# Patient Record
Sex: FEMALE | Race: WHITE | NOT HISPANIC OR LATINO | Employment: OTHER | URBAN - METROPOLITAN AREA
[De-identification: names, ages, dates, MRNs, and addresses within clinical notes are randomized per-mention and may not be internally consistent; named-entity substitution may affect disease eponyms.]

---

## 2017-01-05 ENCOUNTER — COMMUNICATION - HEALTHEAST (OUTPATIENT)
Dept: INTERNAL MEDICINE | Facility: CLINIC | Age: 62
End: 2017-01-05

## 2017-01-06 ENCOUNTER — HOSPITAL ENCOUNTER (OUTPATIENT)
Dept: NEUROLOGY | Facility: CLINIC | Age: 62
Setting detail: THERAPIES SERIES
Discharge: STILL ADMITTED OR ADMINISTRATIVE | End: 2017-01-06

## 2017-01-06 DIAGNOSIS — F43.23 ADJUSTMENT DISORDER WITH MIXED ANXIETY AND DEPRESSED MOOD: ICD-10-CM

## 2017-02-06 ENCOUNTER — RECORDS - HEALTHEAST (OUTPATIENT)
Dept: ADMINISTRATIVE | Facility: OTHER | Age: 62
End: 2017-02-06

## 2017-02-09 ENCOUNTER — COMMUNICATION - HEALTHEAST (OUTPATIENT)
Dept: INTERNAL MEDICINE | Facility: CLINIC | Age: 62
End: 2017-02-09

## 2017-02-13 ENCOUNTER — OFFICE VISIT - HEALTHEAST (OUTPATIENT)
Dept: INTERNAL MEDICINE | Facility: CLINIC | Age: 62
End: 2017-02-13

## 2017-02-13 ENCOUNTER — AMBULATORY - HEALTHEAST (OUTPATIENT)
Dept: INTERNAL MEDICINE | Facility: CLINIC | Age: 62
End: 2017-02-13

## 2017-02-13 DIAGNOSIS — F07.81 POST CONCUSSION SYNDROME: ICD-10-CM

## 2017-02-13 DIAGNOSIS — M10.9 GOUT: ICD-10-CM

## 2017-02-13 DIAGNOSIS — I10 ESSENTIAL HYPERTENSION WITH GOAL BLOOD PRESSURE LESS THAN 140/90: ICD-10-CM

## 2017-02-13 ASSESSMENT — MIFFLIN-ST. JEOR: SCORE: 1412.91

## 2017-02-16 ENCOUNTER — HOSPITAL ENCOUNTER (OUTPATIENT)
Dept: NEUROLOGY | Facility: CLINIC | Age: 62
Setting detail: THERAPIES SERIES
Discharge: STILL ADMITTED OR ADMINISTRATIVE | End: 2017-02-16

## 2017-02-16 DIAGNOSIS — F43.23 ADJUSTMENT DISORDER WITH MIXED ANXIETY AND DEPRESSED MOOD: ICD-10-CM

## 2017-02-23 ENCOUNTER — RECORDS - HEALTHEAST (OUTPATIENT)
Dept: ADMINISTRATIVE | Facility: OTHER | Age: 62
End: 2017-02-23

## 2017-02-24 ENCOUNTER — COMMUNICATION - HEALTHEAST (OUTPATIENT)
Dept: INTERNAL MEDICINE | Facility: CLINIC | Age: 62
End: 2017-02-24

## 2017-03-01 ENCOUNTER — RECORDS - HEALTHEAST (OUTPATIENT)
Dept: ADMINISTRATIVE | Facility: OTHER | Age: 62
End: 2017-03-01

## 2017-04-13 ENCOUNTER — OFFICE VISIT - HEALTHEAST (OUTPATIENT)
Dept: INTERNAL MEDICINE | Facility: CLINIC | Age: 62
End: 2017-04-13

## 2017-04-13 DIAGNOSIS — R25.1 TREMOR: ICD-10-CM

## 2017-04-13 DIAGNOSIS — M10.9 GOUT: ICD-10-CM

## 2017-04-13 DIAGNOSIS — I10 ESSENTIAL HYPERTENSION WITH GOAL BLOOD PRESSURE LESS THAN 140/90: ICD-10-CM

## 2017-04-13 DIAGNOSIS — R51.9 FREQUENT HEADACHES: ICD-10-CM

## 2017-04-13 DIAGNOSIS — F07.81 POST CONCUSSION SYNDROME: ICD-10-CM

## 2017-04-13 ASSESSMENT — MIFFLIN-ST. JEOR: SCORE: 1449.2

## 2017-04-19 ENCOUNTER — COMMUNICATION - HEALTHEAST (OUTPATIENT)
Dept: INTERNAL MEDICINE | Facility: CLINIC | Age: 62
End: 2017-04-19

## 2017-04-19 DIAGNOSIS — S06.9XAA TBI (TRAUMATIC BRAIN INJURY) (H): ICD-10-CM

## 2017-05-01 ENCOUNTER — COMMUNICATION - HEALTHEAST (OUTPATIENT)
Dept: INTERNAL MEDICINE | Facility: CLINIC | Age: 62
End: 2017-05-01

## 2017-05-01 DIAGNOSIS — F41.9 ANXIETY: ICD-10-CM

## 2017-05-02 ENCOUNTER — COMMUNICATION - HEALTHEAST (OUTPATIENT)
Dept: INTERNAL MEDICINE | Facility: CLINIC | Age: 62
End: 2017-05-02

## 2017-05-16 ENCOUNTER — OFFICE VISIT - HEALTHEAST (OUTPATIENT)
Dept: INTERNAL MEDICINE | Facility: CLINIC | Age: 62
End: 2017-05-16

## 2017-05-16 ENCOUNTER — AMBULATORY - HEALTHEAST (OUTPATIENT)
Dept: INTERNAL MEDICINE | Facility: CLINIC | Age: 62
End: 2017-05-16

## 2017-05-16 DIAGNOSIS — M10.9 GOUT: ICD-10-CM

## 2017-05-16 DIAGNOSIS — F07.81 POST CONCUSSION SYNDROME: ICD-10-CM

## 2017-05-16 DIAGNOSIS — S06.0X0S CONCUSSION WITHOUT LOSS OF CONSCIOUSNESS, SEQUELA (H): ICD-10-CM

## 2017-05-16 DIAGNOSIS — E78.5 HYPERLIPIDEMIA, UNSPECIFIED HYPERLIPIDEMIA TYPE: ICD-10-CM

## 2017-05-16 DIAGNOSIS — R25.1 TREMOR: ICD-10-CM

## 2017-05-16 LAB
CHOLEST SERPL-MCNC: 191 MG/DL
FASTING STATUS PATIENT QL REPORTED: NO
HDLC SERPL-MCNC: 55 MG/DL
LDLC SERPL CALC-MCNC: 108 MG/DL
TRIGL SERPL-MCNC: 138 MG/DL

## 2017-05-16 ASSESSMENT — MIFFLIN-ST. JEOR: SCORE: 1440.13

## 2017-05-22 ENCOUNTER — COMMUNICATION - HEALTHEAST (OUTPATIENT)
Dept: NURSING | Facility: CLINIC | Age: 62
End: 2017-05-22

## 2017-05-22 DIAGNOSIS — E78.00 HYPERCHOLESTEREMIA: ICD-10-CM

## 2017-07-10 ENCOUNTER — AMBULATORY - HEALTHEAST (OUTPATIENT)
Dept: NEUROLOGY | Facility: CLINIC | Age: 62
End: 2017-07-10

## 2017-07-11 ENCOUNTER — RECORDS - HEALTHEAST (OUTPATIENT)
Dept: ADMINISTRATIVE | Facility: OTHER | Age: 62
End: 2017-07-11

## 2017-07-13 ENCOUNTER — COMMUNICATION - HEALTHEAST (OUTPATIENT)
Dept: INTERNAL MEDICINE | Facility: CLINIC | Age: 62
End: 2017-07-13

## 2017-07-13 DIAGNOSIS — S06.9XAA TBI (TRAUMATIC BRAIN INJURY) (H): ICD-10-CM

## 2017-08-31 ENCOUNTER — RECORDS - HEALTHEAST (OUTPATIENT)
Dept: ADMINISTRATIVE | Facility: OTHER | Age: 62
End: 2017-08-31

## 2018-01-03 ENCOUNTER — RECORDS - HEALTHEAST (OUTPATIENT)
Dept: ADMINISTRATIVE | Facility: OTHER | Age: 63
End: 2018-01-03

## 2018-04-15 ENCOUNTER — COMMUNICATION - HEALTHEAST (OUTPATIENT)
Dept: INTERNAL MEDICINE | Facility: CLINIC | Age: 63
End: 2018-04-15

## 2018-04-15 DIAGNOSIS — I10 HYPERTENSION: ICD-10-CM

## 2018-05-07 ENCOUNTER — RECORDS - HEALTHEAST (OUTPATIENT)
Dept: MAMMOGRAPHY | Facility: CLINIC | Age: 63
End: 2018-05-07

## 2018-05-07 ENCOUNTER — OFFICE VISIT - HEALTHEAST (OUTPATIENT)
Dept: INTERNAL MEDICINE | Facility: CLINIC | Age: 63
End: 2018-05-07

## 2018-05-07 DIAGNOSIS — M10.9 GOUT: ICD-10-CM

## 2018-05-07 DIAGNOSIS — E55.9 VITAMIN D DEFICIENCY: ICD-10-CM

## 2018-05-07 DIAGNOSIS — Z12.31 ENCOUNTER FOR SCREENING MAMMOGRAM FOR MALIGNANT NEOPLASM OF BREAST: ICD-10-CM

## 2018-05-07 DIAGNOSIS — I10 ESSENTIAL HYPERTENSION: ICD-10-CM

## 2018-05-07 DIAGNOSIS — R25.1 TREMOR: ICD-10-CM

## 2018-05-07 DIAGNOSIS — C50.911 MALIGNANT NEOPLASM OF RIGHT BREAST (H): ICD-10-CM

## 2018-05-07 DIAGNOSIS — Z00.00 HEALTH CARE MAINTENANCE: ICD-10-CM

## 2018-05-07 DIAGNOSIS — Z12.31 VISIT FOR SCREENING MAMMOGRAM: ICD-10-CM

## 2018-05-07 LAB
ALBUMIN SERPL-MCNC: 4.3 G/DL (ref 3.5–5)
ALBUMIN UR-MCNC: NEGATIVE MG/DL
ALP SERPL-CCNC: 168 U/L (ref 45–120)
ALT SERPL W P-5'-P-CCNC: 80 U/L (ref 0–45)
ANION GAP SERPL CALCULATED.3IONS-SCNC: 15 MMOL/L (ref 5–18)
APPEARANCE UR: CLEAR
AST SERPL W P-5'-P-CCNC: 53 U/L (ref 0–40)
BACTERIA #/AREA URNS HPF: ABNORMAL HPF
BILIRUB DIRECT SERPL-MCNC: 0.2 MG/DL
BILIRUB SERPL-MCNC: 0.8 MG/DL (ref 0–1)
BILIRUB UR QL STRIP: NEGATIVE
BUN SERPL-MCNC: 15 MG/DL (ref 8–22)
CALCIUM SERPL-MCNC: 10.1 MG/DL (ref 8.5–10.5)
CHLORIDE BLD-SCNC: 102 MMOL/L (ref 98–107)
CHOLEST SERPL-MCNC: 256 MG/DL
CO2 SERPL-SCNC: 22 MMOL/L (ref 22–31)
COLOR UR AUTO: YELLOW
CREAT SERPL-MCNC: 0.84 MG/DL (ref 0.6–1.1)
ERYTHROCYTE [DISTWIDTH] IN BLOOD BY AUTOMATED COUNT: 11.1 % (ref 11–14.5)
FASTING STATUS PATIENT QL REPORTED: YES
GFR SERPL CREATININE-BSD FRML MDRD: >60 ML/MIN/1.73M2
GLUCOSE BLD-MCNC: 100 MG/DL (ref 70–125)
GLUCOSE UR STRIP-MCNC: NEGATIVE MG/DL
HCT VFR BLD AUTO: 42.2 % (ref 35–47)
HDLC SERPL-MCNC: 55 MG/DL
HGB BLD-MCNC: 14.5 G/DL (ref 12–16)
HGB UR QL STRIP: NEGATIVE
KETONES UR STRIP-MCNC: NEGATIVE MG/DL
LDLC SERPL CALC-MCNC: 140 MG/DL
LEUKOCYTE ESTERASE UR QL STRIP: ABNORMAL
MCH RBC QN AUTO: 32.3 PG (ref 27–34)
MCHC RBC AUTO-ENTMCNC: 34.4 G/DL (ref 32–36)
MCV RBC AUTO: 94 FL (ref 80–100)
NITRATE UR QL: NEGATIVE
PH UR STRIP: 5 [PH] (ref 5–8)
PLATELET # BLD AUTO: 252 THOU/UL (ref 140–440)
PMV BLD AUTO: 6.9 FL (ref 7–10)
POTASSIUM BLD-SCNC: 4.2 MMOL/L (ref 3.5–5)
PROT SERPL-MCNC: 7.4 G/DL (ref 6–8)
RBC # BLD AUTO: 4.5 MILL/UL (ref 3.8–5.4)
RBC #/AREA URNS AUTO: ABNORMAL HPF
SODIUM SERPL-SCNC: 139 MMOL/L (ref 136–145)
SP GR UR STRIP: >=1.03 (ref 1–1.03)
SQUAMOUS #/AREA URNS AUTO: ABNORMAL LPF
TRIGL SERPL-MCNC: 305 MG/DL
TSH SERPL DL<=0.005 MIU/L-ACNC: 2.77 UIU/ML (ref 0.3–5)
URATE SERPL-MCNC: 5.7 MG/DL (ref 2–7.5)
UROBILINOGEN UR STRIP-ACNC: ABNORMAL
WBC #/AREA URNS AUTO: ABNORMAL HPF
WBC: 7.7 THOU/UL (ref 4–11)

## 2018-05-07 ASSESSMENT — MIFFLIN-ST. JEOR: SCORE: 1454.31

## 2018-05-08 LAB
25(OH)D3 SERPL-MCNC: 43.1 NG/ML (ref 30–80)
BACTERIA SPEC CULT: NO GROWTH

## 2018-05-30 ENCOUNTER — COMMUNICATION - HEALTHEAST (OUTPATIENT)
Dept: INTERNAL MEDICINE | Facility: CLINIC | Age: 63
End: 2018-05-30

## 2018-05-31 ENCOUNTER — COMMUNICATION - HEALTHEAST (OUTPATIENT)
Dept: INTERNAL MEDICINE | Facility: CLINIC | Age: 63
End: 2018-05-31

## 2018-05-31 DIAGNOSIS — F41.9 ANXIETY: ICD-10-CM

## 2018-06-11 ENCOUNTER — COMMUNICATION - HEALTHEAST (OUTPATIENT)
Dept: INTERNAL MEDICINE | Facility: CLINIC | Age: 63
End: 2018-06-11

## 2018-06-11 DIAGNOSIS — E78.00 HYPERCHOLESTEREMIA: ICD-10-CM

## 2018-07-09 ENCOUNTER — RECORDS - HEALTHEAST (OUTPATIENT)
Dept: ADMINISTRATIVE | Facility: OTHER | Age: 63
End: 2018-07-09

## 2018-09-04 ENCOUNTER — COMMUNICATION - HEALTHEAST (OUTPATIENT)
Dept: INTERNAL MEDICINE | Facility: CLINIC | Age: 63
End: 2018-09-04

## 2018-09-04 DIAGNOSIS — I10 HYPERTENSION: ICD-10-CM

## 2019-03-11 ENCOUNTER — COMMUNICATION - HEALTHEAST (OUTPATIENT)
Dept: INTERNAL MEDICINE | Facility: CLINIC | Age: 64
End: 2019-03-11

## 2019-03-11 DIAGNOSIS — I10 HYPERTENSION: ICD-10-CM

## 2019-05-28 ENCOUNTER — COMMUNICATION - HEALTHEAST (OUTPATIENT)
Dept: INTERNAL MEDICINE | Facility: CLINIC | Age: 64
End: 2019-05-28

## 2019-06-05 ENCOUNTER — COMMUNICATION - HEALTHEAST (OUTPATIENT)
Dept: INTERNAL MEDICINE | Facility: CLINIC | Age: 64
End: 2019-06-05

## 2019-06-05 DIAGNOSIS — F41.9 ANXIETY: ICD-10-CM

## 2019-06-05 DIAGNOSIS — M1A.00X0 IDIOPATHIC CHRONIC GOUT WITHOUT TOPHUS, UNSPECIFIED SITE: ICD-10-CM

## 2019-06-05 DIAGNOSIS — I10 HYPERTENSION: ICD-10-CM

## 2019-07-05 ENCOUNTER — COMMUNICATION - HEALTHEAST (OUTPATIENT)
Dept: INTERNAL MEDICINE | Facility: CLINIC | Age: 64
End: 2019-07-05

## 2019-07-05 DIAGNOSIS — E78.00 HYPERCHOLESTEREMIA: ICD-10-CM

## 2019-07-05 DIAGNOSIS — C50.911 MALIGNANT NEOPLASM OF RIGHT FEMALE BREAST, UNSPECIFIED ESTROGEN RECEPTOR STATUS, UNSPECIFIED SITE OF BREAST (H): ICD-10-CM

## 2019-07-11 ENCOUNTER — COMMUNICATION - HEALTHEAST (OUTPATIENT)
Dept: INTERNAL MEDICINE | Facility: CLINIC | Age: 64
End: 2019-07-11

## 2019-07-11 DIAGNOSIS — R21 RASH: ICD-10-CM

## 2019-07-12 ENCOUNTER — OFFICE VISIT - HEALTHEAST (OUTPATIENT)
Dept: INTERNAL MEDICINE | Facility: CLINIC | Age: 64
End: 2019-07-12

## 2019-07-12 ENCOUNTER — COMMUNICATION - HEALTHEAST (OUTPATIENT)
Dept: INTERNAL MEDICINE | Facility: CLINIC | Age: 64
End: 2019-07-12

## 2019-07-12 DIAGNOSIS — E78.00 HYPERCHOLESTEREMIA: ICD-10-CM

## 2019-07-12 DIAGNOSIS — F41.9 ANXIETY: ICD-10-CM

## 2019-07-12 DIAGNOSIS — I10 ESSENTIAL HYPERTENSION: ICD-10-CM

## 2019-07-12 DIAGNOSIS — L70.8 OTHER ACNE: ICD-10-CM

## 2019-07-12 DIAGNOSIS — C50.911 MALIGNANT NEOPLASM OF RIGHT FEMALE BREAST, UNSPECIFIED ESTROGEN RECEPTOR STATUS, UNSPECIFIED SITE OF BREAST (H): ICD-10-CM

## 2019-07-12 DIAGNOSIS — R73.9 HYPERGLYCEMIA: ICD-10-CM

## 2019-07-12 DIAGNOSIS — M1A.00X0 IDIOPATHIC CHRONIC GOUT WITHOUT TOPHUS, UNSPECIFIED SITE: ICD-10-CM

## 2019-07-12 DIAGNOSIS — J30.1 ALLERGIC RHINITIS DUE TO POLLEN, UNSPECIFIED SEASONALITY: ICD-10-CM

## 2019-07-12 DIAGNOSIS — K63.5 POLYP OF COLON, UNSPECIFIED PART OF COLON, UNSPECIFIED TYPE: ICD-10-CM

## 2019-07-12 DIAGNOSIS — I10 HYPERTENSION: ICD-10-CM

## 2019-07-12 LAB
ALBUMIN SERPL-MCNC: 4.1 G/DL (ref 3.5–5)
ALP SERPL-CCNC: 130 U/L (ref 45–120)
ALT SERPL W P-5'-P-CCNC: 52 U/L (ref 0–45)
ANION GAP SERPL CALCULATED.3IONS-SCNC: 9 MMOL/L (ref 5–18)
AST SERPL W P-5'-P-CCNC: 34 U/L (ref 0–40)
BILIRUB SERPL-MCNC: 0.6 MG/DL (ref 0–1)
BUN SERPL-MCNC: 15 MG/DL (ref 8–22)
CALCIUM SERPL-MCNC: 10.1 MG/DL (ref 8.5–10.5)
CHLORIDE BLD-SCNC: 103 MMOL/L (ref 98–107)
CHOLEST SERPL-MCNC: 236 MG/DL
CO2 SERPL-SCNC: 26 MMOL/L (ref 22–31)
CREAT SERPL-MCNC: 0.83 MG/DL (ref 0.6–1.1)
ERYTHROCYTE [DISTWIDTH] IN BLOOD BY AUTOMATED COUNT: 11.4 % (ref 11–14.5)
FASTING STATUS PATIENT QL REPORTED: YES
GFR SERPL CREATININE-BSD FRML MDRD: >60 ML/MIN/1.73M2
GLUCOSE BLD-MCNC: 114 MG/DL (ref 70–125)
HCT VFR BLD AUTO: 43.1 % (ref 35–47)
HDLC SERPL-MCNC: 41 MG/DL
HGB BLD-MCNC: 14.8 G/DL (ref 12–16)
LDLC SERPL CALC-MCNC: 102 MG/DL
LDLC SERPL CALC-MCNC: ABNORMAL MG/DL
MCH RBC QN AUTO: 32.8 PG (ref 27–34)
MCHC RBC AUTO-ENTMCNC: 34.2 G/DL (ref 32–36)
MCV RBC AUTO: 96 FL (ref 80–100)
PLATELET # BLD AUTO: 272 THOU/UL (ref 140–440)
PMV BLD AUTO: 7.7 FL (ref 7–10)
POTASSIUM BLD-SCNC: 4.3 MMOL/L (ref 3.5–5)
PROT SERPL-MCNC: 7.2 G/DL (ref 6–8)
RBC # BLD AUTO: 4.49 MILL/UL (ref 3.8–5.4)
SODIUM SERPL-SCNC: 138 MMOL/L (ref 136–145)
TRIGL SERPL-MCNC: 493 MG/DL
TSH SERPL DL<=0.005 MIU/L-ACNC: 2.11 UIU/ML (ref 0.3–5)
WBC: 7.6 THOU/UL (ref 4–11)

## 2019-07-12 RX ORDER — TRIAMCINOLONE ACETONIDE 1 MG/G
CREAM TOPICAL
Qty: 45 G | Refills: 0 | Status: SHIPPED | OUTPATIENT
Start: 2019-07-12 | End: 2023-06-26

## 2019-07-12 ASSESSMENT — MIFFLIN-ST. JEOR: SCORE: 1466.21

## 2019-07-18 ENCOUNTER — RECORDS - HEALTHEAST (OUTPATIENT)
Dept: MAMMOGRAPHY | Facility: CLINIC | Age: 64
End: 2019-07-18

## 2019-07-18 DIAGNOSIS — C50.911 MALIGNANT NEOPLASM OF UNSPECIFIED SITE OF RIGHT FEMALE BREAST (H): ICD-10-CM

## 2020-07-15 ENCOUNTER — RECORDS - HEALTHEAST (OUTPATIENT)
Dept: ADMINISTRATIVE | Facility: OTHER | Age: 65
End: 2020-07-15

## 2020-07-16 ENCOUNTER — OFFICE VISIT - HEALTHEAST (OUTPATIENT)
Dept: INTERNAL MEDICINE | Facility: CLINIC | Age: 65
End: 2020-07-16

## 2020-07-16 DIAGNOSIS — I10 ESSENTIAL HYPERTENSION: ICD-10-CM

## 2020-07-16 DIAGNOSIS — Z78.0 MENOPAUSE: ICD-10-CM

## 2020-07-16 DIAGNOSIS — Z00.00 WELCOME TO MEDICARE PREVENTIVE VISIT: ICD-10-CM

## 2020-07-16 DIAGNOSIS — E78.00 HYPERCHOLESTEREMIA: ICD-10-CM

## 2020-07-16 DIAGNOSIS — K63.5 POLYP OF COLON, UNSPECIFIED PART OF COLON, UNSPECIFIED TYPE: ICD-10-CM

## 2020-07-16 DIAGNOSIS — F41.9 ANXIETY: ICD-10-CM

## 2020-07-16 DIAGNOSIS — N63.21 UNSPECIFIED LUMP IN THE LEFT BREAST, UPPER OUTER QUADRANT: ICD-10-CM

## 2020-07-16 DIAGNOSIS — R22.30 AXILLARY FULLNESS: ICD-10-CM

## 2020-07-16 DIAGNOSIS — R73.9 HYPERGLYCEMIA: ICD-10-CM

## 2020-07-16 DIAGNOSIS — M1A.00X0 IDIOPATHIC CHRONIC GOUT WITHOUT TOPHUS, UNSPECIFIED SITE: ICD-10-CM

## 2020-07-16 DIAGNOSIS — C50.911 MALIGNANT NEOPLASM OF RIGHT FEMALE BREAST, UNSPECIFIED ESTROGEN RECEPTOR STATUS, UNSPECIFIED SITE OF BREAST (H): ICD-10-CM

## 2020-07-16 LAB
ALBUMIN SERPL-MCNC: 4.1 G/DL (ref 3.5–5)
ALP SERPL-CCNC: 146 U/L (ref 45–120)
ALT SERPL W P-5'-P-CCNC: 71 U/L (ref 0–45)
ANION GAP SERPL CALCULATED.3IONS-SCNC: 7 MMOL/L (ref 5–18)
AST SERPL W P-5'-P-CCNC: 55 U/L (ref 0–40)
ATRIAL RATE - MUSE: 70 BPM
BILIRUB SERPL-MCNC: 0.5 MG/DL (ref 0–1)
BUN SERPL-MCNC: 12 MG/DL (ref 8–22)
CALCIUM SERPL-MCNC: 10.1 MG/DL (ref 8.5–10.5)
CHLORIDE BLD-SCNC: 103 MMOL/L (ref 98–107)
CHOLEST SERPL-MCNC: 233 MG/DL
CO2 SERPL-SCNC: 29 MMOL/L (ref 22–31)
CREAT SERPL-MCNC: 0.81 MG/DL (ref 0.6–1.1)
DIASTOLIC BLOOD PRESSURE - MUSE: NORMAL
ERYTHROCYTE [DISTWIDTH] IN BLOOD BY AUTOMATED COUNT: 11.5 % (ref 11–14.5)
FASTING STATUS PATIENT QL REPORTED: YES
GFR SERPL CREATININE-BSD FRML MDRD: >60 ML/MIN/1.73M2
GLUCOSE BLD-MCNC: 154 MG/DL (ref 70–125)
HBA1C MFR BLD: 6.4 % (ref 3.5–6)
HCT VFR BLD AUTO: 45.4 % (ref 35–47)
HDLC SERPL-MCNC: 52 MG/DL
HGB BLD-MCNC: 15.2 G/DL (ref 12–16)
INTERPRETATION ECG - MUSE: NORMAL
LDLC SERPL CALC-MCNC: 104 MG/DL
MCH RBC QN AUTO: 33.7 PG (ref 27–34)
MCHC RBC AUTO-ENTMCNC: 33.4 G/DL (ref 32–36)
MCV RBC AUTO: 101 FL (ref 80–100)
P AXIS - MUSE: -18 DEGREES
PLATELET # BLD AUTO: 257 THOU/UL (ref 140–440)
PMV BLD AUTO: 7.3 FL (ref 7–10)
POTASSIUM BLD-SCNC: 5 MMOL/L (ref 3.5–5)
PR INTERVAL - MUSE: 148 MS
PROT SERPL-MCNC: 7.1 G/DL (ref 6–8)
QRS DURATION - MUSE: 72 MS
QT - MUSE: 420 MS
QTC - MUSE: 453 MS
R AXIS - MUSE: -12 DEGREES
RBC # BLD AUTO: 4.5 MILL/UL (ref 3.8–5.4)
SODIUM SERPL-SCNC: 139 MMOL/L (ref 136–145)
SYSTOLIC BLOOD PRESSURE - MUSE: NORMAL
T AXIS - MUSE: 31 DEGREES
TRIGL SERPL-MCNC: 386 MG/DL
TSH SERPL DL<=0.005 MIU/L-ACNC: 3.13 UIU/ML (ref 0.3–5)
VENTRICULAR RATE- MUSE: 70 BPM
WBC: 6.4 THOU/UL (ref 4–11)

## 2020-07-16 RX ORDER — ANASTROZOLE 1 MG/1
1 TABLET ORAL DAILY
Qty: 90 TABLET | Refills: 3 | Status: SHIPPED | OUTPATIENT
Start: 2020-07-16 | End: 2023-03-27

## 2020-07-16 RX ORDER — ALLOPURINOL 100 MG/1
100 TABLET ORAL DAILY
Qty: 90 TABLET | Refills: 3 | Status: SHIPPED | OUTPATIENT
Start: 2020-07-16 | End: 2022-05-11

## 2020-07-16 RX ORDER — ESCITALOPRAM OXALATE 10 MG/1
10 TABLET ORAL DAILY
Qty: 90 TABLET | Refills: 3 | Status: SHIPPED | OUTPATIENT
Start: 2020-07-16 | End: 2022-05-11

## 2020-07-16 RX ORDER — LOSARTAN POTASSIUM 100 MG/1
100 TABLET ORAL DAILY
Qty: 90 TABLET | Refills: 3 | Status: SHIPPED | OUTPATIENT
Start: 2020-07-16 | End: 2022-05-11

## 2020-07-16 RX ORDER — ATORVASTATIN CALCIUM 20 MG/1
20 TABLET, FILM COATED ORAL DAILY
Qty: 90 TABLET | Refills: 3 | Status: SHIPPED | OUTPATIENT
Start: 2020-07-16 | End: 2022-05-11

## 2020-07-16 ASSESSMENT — MIFFLIN-ST. JEOR: SCORE: 1497.97

## 2020-08-06 ENCOUNTER — RECORDS - HEALTHEAST (OUTPATIENT)
Dept: ADMINISTRATIVE | Facility: OTHER | Age: 65
End: 2020-08-06

## 2020-08-06 ENCOUNTER — RECORDS - HEALTHEAST (OUTPATIENT)
Dept: BONE DENSITY | Facility: CLINIC | Age: 65
End: 2020-08-06

## 2020-08-06 ENCOUNTER — HOSPITAL ENCOUNTER (OUTPATIENT)
Dept: ULTRASOUND IMAGING | Facility: CLINIC | Age: 65
Discharge: HOME | End: 2020-08-06

## 2020-08-06 ENCOUNTER — HOSPITAL ENCOUNTER (OUTPATIENT)
Dept: MAMMOGRAPHY | Facility: CLINIC | Age: 65
Discharge: HOME | End: 2020-08-06

## 2020-08-06 DIAGNOSIS — Z78.0 ASYMPTOMATIC MENOPAUSAL STATE: ICD-10-CM

## 2020-08-06 DIAGNOSIS — C50.911 MALIGNANT NEOPLASM OF RIGHT FEMALE BREAST, UNSPECIFIED ESTROGEN RECEPTOR STATUS, UNSPECIFIED SITE OF BREAST (H): ICD-10-CM

## 2020-08-06 DIAGNOSIS — R22.30 AXILLARY FULLNESS: ICD-10-CM

## 2020-08-06 DIAGNOSIS — N63.21 UNSPECIFIED LUMP IN THE LEFT BREAST, UPPER OUTER QUADRANT: ICD-10-CM

## 2021-05-29 NOTE — TELEPHONE ENCOUNTER
Who is calling:  The patient   Reason for Call:  The patient would like to know if Dr. Hills would like to take her on as a new patient.  She was with Dr. Malcolm for years, and was recommended to her.  She hasn't seen anybody since her leaving and being a breast cancer survivor, she thought it was time to make a move.  She would like to schedule an Establish care and Physical with Dr. Hills if possible.  Date of last appointment with primary care: 05-07-18  Okay to leave a detailed message: Yes

## 2021-05-29 NOTE — TELEPHONE ENCOUNTER
Left detailed message for pt explaining that Dr. Hills was not currently accepting new patients and that her practice was closed.  Advised pt to call back with further questions.

## 2021-05-29 NOTE — TELEPHONE ENCOUNTER
Patient has not established care with a provider.  Please assign refill request to covering provider per Clinic standard process.      RN cannot approve Refill Request    RN can NOT refill this medication Protocol failed and NO refill given. Last office visit: 5/16/2017 Maria Malcolm MD Last Physical: 5/7/2018 Last MTM visit: Visit date not found Last visit same specialty: 5/16/2017 Maria Malcolm MD.  Next visit within 3 mo: Visit date not found  Next physical within 3 mo: Visit date not found      Bharti Jackson, [facility] Triage/Med Refill 6/6/2019    Requested Prescriptions   Pending Prescriptions Disp Refills     allopurinol (ZYLOPRIM) 100 MG tablet [Pharmacy Med Name: ALLOPURINOL 100MG TABLETS] 90 tablet 0     Sig: TAKE 1 TABLET(100 MG) BY MOUTH DAILY       Allopurinol/Febuxostat Refill Protocol  Failed - 6/5/2019  5:12 AM        Failed - LFT or AST or ALT in last 12 months     Albumin   Date Value Ref Range Status   05/07/2018 4.3 3.5 - 5.0 g/dL Final     Bilirubin, Total   Date Value Ref Range Status   05/07/2018 0.8 0.0 - 1.0 mg/dL Final     Bilirubin, Direct   Date Value Ref Range Status   05/07/2018 0.2 <=0.5 mg/dL Final     Alkaline Phosphatase   Date Value Ref Range Status   05/07/2018 168 (H) 45 - 120 U/L Final     AST   Date Value Ref Range Status   05/07/2018 53 (H) 0 - 40 U/L Final     ALT   Date Value Ref Range Status   05/07/2018 80 (H) 0 - 45 U/L Final     Protein, Total   Date Value Ref Range Status   05/07/2018 7.4 6.0 - 8.0 g/dL Final                Failed - Visit with PCP or prescribing provider visit in past 12 months     Last office visit with prescriber/PCP: 5/16/2017 Maria Malcolm MD OR same dept: Visit date not found OR same specialty: 5/16/2017 Maria Malcolm MD  Last physical: 5/7/2018 Last MTM visit: Visit date not found   Next visit within 3 mo: Visit date not found  Next physical within 3 mo: Visit date not found  Prescriber OR PCP: Maria MCKEON  MD Yun  Last diagnosis associated with med order: 1. Anxiety  - escitalopram oxalate (LEXAPRO) 10 MG tablet [Pharmacy Med Name: ESCITALOPRAM 10MG TABLETS]; TAKE 1 TABLET BY MOUTH DAILY  Dispense: 90 tablet; Refill: 0    2. Hypertension  - losartan (COZAAR) 100 MG tablet [Pharmacy Med Name: LOSARTAN 100MG TABLETS]; TAKE 1 TABLET(100 MG) BY MOUTH DAILY  Dispense: 90 tablet; Refill: 0    If protocol passes may refill for 12 months if within 3 months of last provider visit (or a total of 15 months).             escitalopram oxalate (LEXAPRO) 10 MG tablet [Pharmacy Med Name: ESCITALOPRAM 10MG TABLETS] 90 tablet 0     Sig: TAKE 1 TABLET BY MOUTH DAILY       [facility] Refill Protocol  Failed - 6/5/2019  5:12 AM        Failed - PCP or prescribing provider visit in last year     Last office visit with prescriber/PCP: 5/16/2017 Maria Malcolm MD OR same dept: Visit date not found OR same specialty: 5/16/2017 Maria Malcolm MD  Last physical: 5/7/2018 Last MTM visit: Visit date not found   Next visit within 3 mo: Visit date not found  Next physical within 3 mo: Visit date not found  Prescriber OR PCP: Maria Malcolm MD  Last diagnosis associated with med order: 1. Anxiety  - escitalopram oxalate (LEXAPRO) 10 MG tablet [Pharmacy Med Name: ESCITALOPRAM 10MG TABLETS]; TAKE 1 TABLET BY MOUTH DAILY  Dispense: 90 tablet; Refill: 0    2. Hypertension  - losartan (COZAAR) 100 MG tablet [Pharmacy Med Name: LOSARTAN 100MG TABLETS]; TAKE 1 TABLET(100 MG) BY MOUTH DAILY  Dispense: 90 tablet; Refill: 0    If protocol passes may refill for 12 months if within 3 months of last provider visit (or a total of 15 months).             losartan (COZAAR) 100 MG tablet [Pharmacy Med Name: LOSARTAN 100MG TABLETS] 90 tablet 0     Sig: TAKE 1 TABLET(100 MG) BY MOUTH DAILY       Angiotensin Receptor Blocker Protocol Failed - 6/5/2019  5:12 AM        Failed - PCP or prescribing provider visit in past 12 months       Last office visit  with prescriber/PCP: 5/16/2017 Maria Malcolm MD OR same dept: Visit date not found OR same specialty: 5/16/2017 Maria Malcolm MD  Last physical: 5/7/2018 Last MTM visit: Visit date not found   Next visit within 3 mo: Visit date not found  Next physical within 3 mo: Visit date not found  Prescriber OR PCP: Maria Malcolm MD  Last diagnosis associated with med order: 1. Anxiety  - escitalopram oxalate (LEXAPRO) 10 MG tablet [Pharmacy Med Name: ESCITALOPRAM 10MG TABLETS]; TAKE 1 TABLET BY MOUTH DAILY  Dispense: 90 tablet; Refill: 0    2. Hypertension  - losartan (COZAAR) 100 MG tablet [Pharmacy Med Name: LOSARTAN 100MG TABLETS]; TAKE 1 TABLET(100 MG) BY MOUTH DAILY  Dispense: 90 tablet; Refill: 0    If protocol passes may refill for 12 months if within 3 months of last provider visit (or a total of 15 months).             Failed - Serum potassium within the past 12 months     No results found for: LN-POTASSIUM          Failed - Blood pressure filed in past 12 months     BP Readings from Last 1 Encounters:   05/07/18 136/88             Failed - Serum creatinine within the past 12 months     Creatinine   Date Value Ref Range Status   05/07/2018 0.84 0.60 - 1.10 mg/dL Final

## 2021-05-30 VITALS — HEIGHT: 69 IN | BODY MASS INDEX: 26.66 KG/M2 | WEIGHT: 180 LBS

## 2021-05-30 VITALS — HEIGHT: 69 IN | BODY MASS INDEX: 27.85 KG/M2 | WEIGHT: 188 LBS

## 2021-05-30 NOTE — PROGRESS NOTES
Office Visit   Dorita Stringer   63 y.o. female    Date of Visit: 7/12/2019    Chief Complaint   Patient presents with     Annual Exam     Establish care physical- pt is fasting        Assessment and Plan   1. Hypertension  She has a history of hypertension which is well controlled on her medication.  Due for lab work today and I will get metabolic panel and thyroid function.  She is not having any cardiac symptoms.  Will get back to her with her test results.  - Thyroid [place]  - Comprehensive Metabolic Panel    2. Hypercholesteremia  Her cholesterol is due and she is on atorvastatin.  We will check that today.  She is not having any side effects.  - Lipid [place]  - atorvastatin (LIPITOR) 20 MG tablet; Take 1 tablet (20 mg total) by mouth daily.  Dispense: 90 tablet; Refill: 3    3. Malignant neoplasm of right female breast, unspecified estrogen receptor status, unspecified site of breast (H)  Is due for a mammogram.  She has a history of breast cancer.  She is on Arimidex and will continue with that.  We will also check her liver tests and CBC today.  - Mammo Screening Bilateral; Future  - HM2(CBC w/o Differential)  - Comprehensive Metabolic Panel  - anastrozole (ARIMIDEX) 1 mg tablet; Take 1 tablet (1 mg total) by mouth daily.  Dispense: 90 tablet; Refill: 3    4. Polyp of colon, unspecified part of colon, unspecified type  She is due for colon cancer screening.  She has a history of polyps and her last colonoscopy was 3 years ago.  - Ambulatory referral for Colonoscopy    5. Anxiety  She is doing well on escitalopram and will continue with medication.  - escitalopram oxalate (LEXAPRO) 10 MG tablet; Take 1 tablet (10 mg total) by mouth daily.  Dispense: 90 tablet; Refill: 3    6. Allergic rhinitis due to pollen, unspecified seasonality  - fluticasone propionate (FLONASE) 50 mcg/actuation nasal spray; 2 sprays into each nostril daily.  Dispense: 10 g; Refill: 3    7. Idiopathic chronic gout without tophus,  unspecified site  - allopurinol (ZYLOPRIM) 100 MG tablet; Take 1 tablet (100 mg total) by mouth daily.  Dispense: 90 tablet; Refill: 3    8. Acne Rosacea  - triamcinolone (KENALOG) 0.1 % cream; Apply to rash daily as needed  Dispense: 45 g; Refill: 3    9. Hx Brain Injury  She has a history of a closed head injury.  She has ongoing symptoms of dizziness and tremor.  With a neuro psychiatric specialist at the [facility].  She has no acute concerns in this regard today.      No follow-ups on file.     History of Present Illness   This 63 y.o. old female comes in for annual evaluation of her medical problems and to establish care.  She has a history of hypertension and hyperlipidemia which have been well controlled.  She has not had any recent symptoms of chest pain or palpitations.  She is due for blood work and is fasting today.  She also has a history of breast cancer and had a lumpectomy in 2015.  She is a little bit behind on mammograms and would like to get that set up.  She takes Arimidex without difficulty and has not had any new worrisome symptoms.  She also notes that and 2016 she had a significant head injury while working at [employer].  Since then she has had trouble with a tremor and cognitive difficulties.  She sees a neuro psychiatrist at the [facility] which is been helpful.  She is now on SSI disability.  She has no acute concerns in this regard.  She is otherwise been feeling well and just needs a refill on medications.    Review of Systems: As above, systems otherwise reviewed and negative.     Medications, Allergies and Problem List   Patient Active Problem List   Diagnosis     Hypercholesteremia     Migraine Headache     Hypertension     Allergic Rhinitis     Irritable Bowel Syndrome     Gout     Rosacea     Malignant neoplasm of right breast (H)     Balance problems     Post concussion syndrome     Vitamin D deficiency     Idiopathic urticaria     Tremor     Mild neurocognitive disorder      "Mild traumatic brain injury (H)     Polyp of colon     Current Outpatient Medications   Medication Sig Dispense Refill     allopurinol (ZYLOPRIM) 100 MG tablet Take 1 tablet (100 mg total) by mouth daily. 90 tablet 3     anastrozole (ARIMIDEX) 1 mg tablet Take 1 tablet (1 mg total) by mouth daily. 90 tablet 3     atorvastatin (LIPITOR) 20 MG tablet Take 1 tablet (20 mg total) by mouth daily. 90 tablet 3     calcium-vitamin D (CALCIUM-VITAMIN D) 500 mg(1,250mg) -200 unit per tablet Take 1 tablet by mouth daily.        cholecalciferol, vitamin D3, 5,000 unit Tab Take by mouth.       escitalopram oxalate (LEXAPRO) 10 MG tablet Take 1 tablet (10 mg total) by mouth daily. 90 tablet 3     fluticasone propionate (FLONASE) 50 mcg/actuation nasal spray 2 sprays into each nostril daily. 10 g 3     ibuprofen (ADVIL,MOTRIN) 200 MG tablet Take 200 mg by mouth every 6 (six) hours as needed for pain or headaches.       loratadine (CLARITIN) 10 mg tablet Take 10 mg by mouth daily.       losartan (COZAAR) 100 MG tablet Take 1 tablet (100 mg total) by mouth daily. 90 tablet 3     triamcinolone (KENALOG) 0.1 % cream Apply to rash daily as needed 45 g 3     triamcinolone (KENALOG) 0.1 % cream APPLY EXTERNALLY TO RASH DAILY AS NEEDED 45 g 0     No current facility-administered medications for this visit.      Allergies   Allergen Reactions     Blueberry Other (See Comments)     Codeine           Physical Exam     /84 (Patient Site: Left Arm, Patient Position: Sitting)   Pulse 90   Ht 5' 9\" (1.753 m)   Wt 190 lb (86.2 kg)   SpO2 98%   BMI 28.06 kg/m      General:  Patient is alert and in no apparent distress.  Breast:  Normal breast tissues with no masses or adenopathy noted.  Cardiovascular:  Regular rate and rhythm, normal S1/S2, no murmurs, rubs, or gallop.  Pulmonary:  Lungs are clear to auscultation bilaterally with normal respiratory effort.  Gastrointestinal:  Abdomen is soft, non-tender, non-distended, with no " organomegaly, rebound or guarding.  Extremities:  No joint abnormalities with no LE edema.  Strong dorsalis pedis pulses.  Skin:  Warm and well perfused with no rashes or abnormalities.         Additional Information   Social History     Tobacco Use     Smoking status: Current Some Day Smoker     Packs/day: 0.10     Years: 20.00     Pack years: 2.00     Smokeless tobacco: Never Used   Substance Use Topics     Alcohol use: Yes     Alcohol/week: 1.2 oz     Types: 2 Glasses of wine per week     Drug use: No            Addie Aldana MD

## 2021-05-30 NOTE — TELEPHONE ENCOUNTER
RN cannot approve Refill Request    RN can NOT refill this medication No established PCP. Last office visit: 5/16/2017 Maria Malcolm MD Last Physical: 5/7/2018 Last MTM visit: Visit date not found Last visit same specialty: 5/16/2017 Maria Malcolm MD.  Next visit within 3 mo: Visit date not found  Next physical within 3 mo: Visit date not found      Rose Benoit, [facility] Triage/Med Refill 7/6/2019    Requested Prescriptions   Pending Prescriptions Disp Refills     atorvastatin (LIPITOR) 20 MG tablet [Pharmacy Med Name: ATORVASTATIN 20MG TABLETS] 90 tablet 0     Sig: TAKE 1 TABLET(20 MG) BY MOUTH DAILY       Statins Refill Protocol (Hmg CoA Reductase Inhibitors) Failed - 7/5/2019  5:08 AM        Failed - PCP or prescribing provider visit in past 12 months      Last office visit with prescriber/PCP: 5/16/2017 Maria Malcolm MD OR same dept: Visit date not found OR same specialty: 5/16/2017 Maria Malcolm MD  Last physical: 5/7/2018 Last MTM visit: Visit date not found   Next visit within 3 mo: Visit date not found  Next physical within 3 mo: Visit date not found  Prescriber OR PCP: Maria Malcolm MD  Last diagnosis associated with med order: 1. Hypercholesteremia  - atorvastatin (LIPITOR) 20 MG tablet [Pharmacy Med Name: ATORVASTATIN 20MG TABLETS]; TAKE 1 TABLET(20 MG) BY MOUTH DAILY  Dispense: 90 tablet; Refill: 0    If protocol passes may refill for 12 months if within 3 months of last provider visit (or a total of 15 months).             anastrozole (ARIMIDEX) 1 mg tablet [Pharmacy Med Name: ANASTROZOLE 1MG TABLETS] 90 tablet 0     Sig: TAKE 1 TABLET(1 MG) BY MOUTH DAILY       There is no refill protocol information for this order

## 2021-05-30 NOTE — TELEPHONE ENCOUNTER
Has est. Care appointment Dr. Aldana 7/12.  Can you fill or wait till seen?    Precious Gonzalez, RN, [facility] Nurse Triage/Med Refills RN

## 2021-05-30 NOTE — TELEPHONE ENCOUNTER
Former patient of Yun & has not established care with another provider.  Please assign refill request to covering provider per Clinic standard process.      RN cannot approve Refill Request    RN can NOT refill this medication med is not covered by policy/route to provider     . Last office visit: 5/16/2017 Maria Malcolm MD Last Physical: 5/7/2018 Last MTM visit: Visit date not found Last visit same specialty: 5/16/2017 Maira Malcolm MD.  Next visit within 3 mo: Visit date not found  Next physical within 3 mo: Visit date not found      Yomaira Kiran, [facility] Triage/Med Refill 7/11/2019    Requested Prescriptions   Pending Prescriptions Disp Refills     triamcinolone (KENALOG) 0.1 % cream [Pharmacy Med Name: TRIAMCINOLONE 0.1% CREAM 15GM] 45 g 0     Sig: APPLY EXTERNALLY TO RASH DAILY AS NEEDED       There is no refill protocol information for this order

## 2021-05-31 VITALS — HEIGHT: 69 IN | BODY MASS INDEX: 27.55 KG/M2 | WEIGHT: 186 LBS

## 2021-06-01 VITALS — HEIGHT: 69 IN | WEIGHT: 187.38 LBS | BODY MASS INDEX: 27.75 KG/M2

## 2021-06-02 ENCOUNTER — RECORDS - HEALTHEAST (OUTPATIENT)
Dept: ADMINISTRATIVE | Facility: CLINIC | Age: 66
End: 2021-06-02

## 2021-06-03 VITALS — HEIGHT: 69 IN | WEIGHT: 190 LBS | BODY MASS INDEX: 28.14 KG/M2

## 2021-06-04 VITALS
HEART RATE: 86 BPM | WEIGHT: 197 LBS | BODY MASS INDEX: 29.18 KG/M2 | SYSTOLIC BLOOD PRESSURE: 138 MMHG | OXYGEN SATURATION: 98 % | HEIGHT: 69 IN | DIASTOLIC BLOOD PRESSURE: 88 MMHG

## 2021-06-08 NOTE — PROGRESS NOTES
"Psychology Progress Note    Date of Service:  2017  Duration:  50 minutes (11:00 AM - 11:50 AM)    Name:  Dorita Stringer  :  1955  MRN:  [id number]    Necessity: This session is necessary to address anxiety, adjustment, depressed mood, and frustration related to ongoing physical symptoms following concussion in 2016.    Intervention: Patient spent majority of session expressing significant frustration with her medical care. She reported that within the last month she underwent testing with a neurophthalmologist at [facility], which revealed significant deficits in vision. She expressed frustration that no one in the [place] [facility] caught this despite multiple complaints of vision problems. Of note, in patient's recovery course, she developed a significant full-body tremor which then became a major focus of medical treatment. This is the reason that she was referred to this provider (for concerns related to possible conversion symptoms) as well as neurology. Patient was eventually started on gabapentin and her tremors were then fully controlled. She noted that her tremors returned briefly when undergoing recent vision testing, which she attributed to the stress of her brain trying to \"put everything together\" yet being \"on overload.\" Patient was visibly upset throughout session, indicating that she did not receive adequate care. Writer provided support and validation for her concerns. At the end of session, she acknowledged that she \"gave [writer] an earful\" and expressed appreciation for the support. She indicated that she will call to schedule a follow-up appointment with writer as she sees fit, following upcoming neurology and neuroopthalamolgy appointments, as well as beginning vision therapy. She indicated that she would have vision testing results sent over to [place] (provided with contact information in how to do so), and acknowledged that she is hoping that our " "clinic will learn something from her experience. Writer again provided reassurance and validation.      Mental Status: Patient arrived on-time and was unaccompanied. She was casually dressed and adequately groomed. Her gait and motor activity were slowed due to report of recent gout, and her previously noted full-body tremors were not present. Patient appeared oriented to person, place, situation, and time, although orientation was not formally assessed.  Recent and remote memory, attention, concentration, language, and fund of knowledge are generally intact and unchanged from patient's baseline. Neuropsychological testing on 11/22/16 revealed mild deficits in basic attention and mental flexibility that had not improved since initial testing (9/20/16), with performance in all other areas falling within normal limits. She will undergo follow-up neuropsychological testing in May 2017. Mood was described as \"frustrated\" and affect appeared congruent and irritable. No indication of SI/HI.     Participation: The patient was able to participate and benefit from treatment as evidenced by her verbal expression of ideas and initiation of topics discussed.    Psychotherapeutic Techniques: Cognitive-behavioral therapy, motivational interviewing and supportive psychotherapy strategies were utilized.    Progress: The patient feels that her progress toward goals was stunted by not obtaining appropriate referrals. However, after voicing frustration, she was able to acknowledge that she does believe she will continue to make progress from a visual, cognitive, and psychological perspective. Writer will remain available in psychotherapy for continued processing.      Diagnosis: Adjustment Disorder with Mixed Anxiety and Depressed Mood    Plan: Patient indicated that she will call to schedule follow-up appointment with this writer as she deems appropriate. She will be seen by neurology in March, who will determine whether or not to " clear her back to work. She will also begin vision therapy, and she noted that she will schedule appointment with writer if undergoing additional stress and frustration. She is scheduled to undergo repeat neuropsychological testing with Dr. Plaza in May 2017.      Provider Name:  Miriam Haley PsyD, LP  Date:  2/16/2017  Time:  11:00 AM

## 2021-06-08 NOTE — PROGRESS NOTES
"   [facility] Follow Up Note    Dorita Stringer   61 y.o. female    Date of Visit: 2/13/2017    Chief Complaint   Patient presents with     Gout     Lft Knee     Subjective  Dorita comes in today as she's had a several day course of knee pain was now leaning and warmth.  She went to see orthopedics and he said it was either gout or a knee infection as he did do x-rays and said everything looked fine with the actual joint.  She went home and took a Medrol dose pack and that's slightly improved but still is painful and warm.  She is unable to straighten it completely.  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  She is following both in the [place] [facility] and with neurology.    ROS A comprehensive review of systems was performed and was otherwise negative    Social History:   Social History     Social History Narrative    She is single, lives alone, and does not have children.  She does not smoke cigarettes and occasionally has an alcoholic beverage. She is an independent  and also works part-time at [employer].       Medications were reconciled.  Allergies, social and family history, and the problem list were all reviewed and updated.    Old records reviewed:  Records from [place] and Dr. Le    Exam  General Appearance: Pleasant and alert  Vitals:    02/13/17 1039   BP: 126/70   Pulse: 80   Resp: 14   Weight: 180 lb (81.6 kg)   Height: 5' 8.5\" (1.74 m)      Body mass index is 26.97 kg/(m^2).  Wt Readings from Last 3 Encounters:   02/13/17 180 lb (81.6 kg)   12/30/16 179 lb (81.2 kg)   10/31/16 178 lb 12.8 oz (81.1 kg)     HEENT: Sclera are clear.   Lungs: Normal respirations  Abdomen: Soft and nondistended  Extremities: Left knee is slightly swollen and erythematous and warm to touch  Skin: No rashes  Neuro: Moves all " extremities and has facial symmetry  Gait: Ambulates with a normal gait    Assessment/Plan  1. Gout  We'll treat her with a longer course of prednisone.  Check labs today.  Discontinue hydrochlorothiazide, reduce the amount of fruit juice she is drinking an sick with plain water  - Erythrocyte Sedimentation Rate  - Uric Acid  - C-Reactive Protein  - HM1(CBC and Differential)  - HM1 (CBC with Diff)    2. Post concussion syndrome  She continues to follow-up with neurology in the concussion clinic.  She is still not Working.    3. Essential hypertension with goal blood pressure less than 140/90  Switch from-lisinopril/HCTZ to plain losartan.  Have her see me in 1-2 months.        Maria Malcolm MD  2/13/2017    Much or all of the text in this note was generated through the use of Dragon Dictate voice-to-text software. Errors in spelling or words which seem out of context are unintentional. Sound alike errors, in particular, may have escaped editing.

## 2021-06-08 NOTE — PROGRESS NOTES
"Psychology Progress Note    Date of Service:  2017  Duration:  50 minutes (11:00 AM - 11:50 AM)    Name:  Dorita Stringer  :  1955  MRN:  [id number]    Necessity: This session is necessary to address anxiety, adjustment, depressed mood, postconcussive symptoms, and possible conversion symptoms.    Intervention: Patient reported that her tremors have discontinued with the use of gabapentin prescribed by her neurologist, Dr. Le. She reported that he indicated that the tremors were likely caused by brain trauma suffered during concussion, as they would not have responded as well to neurological medication had the etiology been different. Patient stated that she feels relieved to have answers to the previous unknowns. As a result, she stated her anxiety has decreased by \"95%.\" Writer provided support and encouragement for stated progress. Patient indicated that she still perceives herself to have cognitive deficits in problem-solving and multi-tasking, but that these have continued to gradually improve over time, particularly with the use of apps/exercises provided in speech therapy. Patient expressed some apprehension regarding her ability to return to baseline from a cognitive perspective, and writer provided reassurance regarding the known recovery trajectory of concussion. Patient indicated that she is hopeful her return to work (in approximately 1 month) will go smoothly, but would like to meet with this writer for an additional session to help her through it. We agreed to meet approximately 1-2 weeks after she returns to work at [employer] and to determine whether to continue or terminate treatment at that time.     Mental Status: Patient arrived on-time and was unaccompanied. She was casually dressed and adequately groomed. Her gait and motor activity were unremarkable, and her previously noted full-body tremors were not present. Patient appeared oriented to person, place, situation, and " "time, although orientation was not formally assessed.  Recent and remote memory, attention, concentration, language, and fund of knowledge are generally intact and unchanged from patient's baseline. Neuropsychological testing on 11/22/16 revealed mild deficits in basic attention and mental flexibility that had not improved since initial testing (9/20/16), with performance in all other areas falling within normal limits. Patient asserted that she perceives that her cognitive symptoms have continued to improve over time. She will undergo follow-up neuropsychological testing in May 2017. Mood was described as \"good\" and affect appeared congruent and generally euthymic. No indication of SI/HI. Patient was cooperative and pleasant throughout session.     Participation: The patient was able to participate and benefit from treatment as evidenced by her verbal expression of ideas and initiation of topics discussed.    Psychotherapeutic Techniques: Cognitive-behavioral therapy, motivational interviewing and supportive psychotherapy strategies were utilized.    Progress: The patient feels she is making good progress toward goals, as her anxiety has been significantly reduced following amelioration of tremors. This writer agrees with patient's assessment and will continue to monitor and target goals in psychotherapy.    Diagnosis: Adjustment Disorder with Mixed Anxiety and Depressed Mood    Plan: Patient will return in the 3rd week in February after she likely begins return to work at [employer]. As she has made good progress toward goals, we will re-evaluate psychological progress/stability at that time and determine whether there is a need for continued psychotherapy or termination.       Provider Name:  Miriam Haley PsyD, LP  Date:  1/6/2017  Time:  11:00 AM        "

## 2021-06-09 NOTE — PROGRESS NOTES
Assessment and Plan:     1. Wellness examination  Her examination is satisfactory today.  We reviewed the documentation for her welcome to Medicare visit.  She has no problem with falling.  She does not have an advanced directive and we discussed getting 1.  No difficulties with activities of daily living.  No signs of memory loss.  She is due for mammogram and since she is feeling fullness in the left axilla we will set up a diagnostic mammogram.  She is due for bone density screening we will set that up.  She is due for colon cancer screening and she is going to set up a colonoscopy.  She is fasting and needs a repeat of her lipids.  She is on atorvastatin.  Blood pressure is well controlled with her medications.  She is due for recheck of her labs as well.  She is due for pneumonia vaccine and will provide that today.  Otherwise age-appropriate health maintenance is up-to-date.    2. Malignant neoplasm of right female breast, unspecified estrogen receptor status, unspecified site of breast (H)  - HM2(CBC w/o Differential)  - anastrozole (ARIMIDEX) 1 mg tablet; Take 1 tablet (1 mg total) by mouth daily.  Dispense: 90 tablet; Refill: 3  - Mammo Diagnostic Bilateral; Future  - US Breast Bilateral Limited 1-3 Quadrants; Future    3. Hypercholesteremia  She has not had any recent cardiac symptoms.  - Lipid Cascade  - Thyroid Cora  - atorvastatin (LIPITOR) 20 MG tablet; Take 1 tablet (20 mg total) by mouth daily.  Dispense: 90 tablet; Refill: 3    4. Hypertension  Again blood pressure is well controlled.  No side effects from her medications.  - HM2(CBC w/o Differential)  - Thyroid Cora  - Comprehensive Metabolic Panel  - losartan (COZAAR) 100 MG tablet; Take 1 tablet (100 mg total) by mouth daily.  Dispense: 90 tablet; Refill: 3    5. Polyp of colon, unspecified part of colon, unspecified type  - Ambulatory referral for Colonoscopy    6. Hyperglycemia  - Glycosylated Hemoglobin A1c  - Thyroid Cascade    7.  Anxiety  She has done well on the escitalopram.  - escitalopram oxalate (LEXAPRO) 10 MG tablet; Take 1 tablet (10 mg total) by mouth daily.  Dispense: 90 tablet; Refill: 3    8. Idiopathic chronic gout without tophus, unspecified site  - allopurinoL (ZYLOPRIM) 100 MG tablet; Take 1 tablet (100 mg total) by mouth daily.  Dispense: 90 tablet; Refill: 3    9. Menopause  - DXA Bone Density Scan; Future    10. Axillary fullness  - Mammo Diagnostic Bilateral; Future    11. Unspecified lump in the left breast, upper outer quadrant   - Mammo Diagnostic Bilateral; Future      The patient's current medical problems were reviewed.    I have had an Advance Directives discussion with the patient.  The following health maintenance schedule was reviewed with the patient and provided in printed form in the after visit summary:   Health Maintenance   Topic Date Due     ZOSTER VACCINES (1 of 2) 12/17/2004     COLORECTAL CANCER SCREENING  10/26/2019     PNEUMOCOCCAL IMMUNIZATION 65+ LOW/MEDIUM RISK (1 of 2 - PCV13) 12/17/2019     DXA SCAN  12/17/2019     MAMMOGRAM  07/18/2020     INFLUENZA VACCINE RULE BASED (1) 08/01/2020     MEDICARE ANNUAL WELLNESS VISIT  07/16/2021     FALL RISK ASSESSMENT  07/16/2021     LIPID  07/12/2024     ADVANCE CARE PLANNING  07/16/2025     TD 18+ HE  05/07/2028     TDAP ADULT ONE TIME DOSE  Completed     HEPATITIS B VACCINES  Aged Out     HEPATITIS C SCREENING  Discontinued     HIV SCREENING  Discontinued        Subjective:   Chief Complaint: Dorita Stringer is an 64 y.o. female here for a Welcome to Medicare visit.   HPI: This is a 64-year-old female who comes in for a physical.  At her welcome to Medicare visit.  She has a history of breast cancer on the right breast, hyperlipidemia, hypertension, anxiety.  She has noted some fullness in her left axilla that is new.  She would like to have that evaluated today.  She is on atorvastatin and we will need to recheck her lipids today.  She has not had any  problems with chest pain or palpitations or other cardiac symptoms.  Her blood pressure is well controlled and she tolerates her medication well.  Her anxiety has been controlled with Lexapro.  She is due for a mammogram, bone density screening, EKG today, pneumonia vaccine, and colon cancer screening.  She has had polyps in the past.  She has no other acute concerns today.    Review of Systems:   Please see above.  The rest of the review of systems are negative for all systems.    Patient Care Team:  Addie Aldana MD as PCP - General (Internal Medicine)  Addie Aldana MD as Assigned PCP     Patient Active Problem List   Diagnosis     Hypercholesteremia     Migraine Headache     Hypertension     Allergic Rhinitis     Irritable Bowel Syndrome     Gout     Rosacea     Malignant neoplasm of right breast (H)     Vitamin D deficiency     Mild neurocognitive disorder     Polyp of colon     Anxiety     Past Medical History:   Diagnosis Date     Acne      Allergic rhinitis      Gout      Hx of radiation therapy      Hyperlipemia      Idiopathic urticaria 10/31/2016     Irritable bowel syndrome      Malignant neoplasm of right breast (H) 2016     Migraine      Mild traumatic brain injury (H) 2017     Rosacea       Past Surgical History:   Procedure Laterality Date     BREAST BIOPSY Right      BREAST LUMPECTOMY Right      ELBOW SURGERY       RADIATION IMPLANT, FEMALE        Family History   Problem Relation Age of Onset     Breast cancer Mother          age 46     Heart attack Father          age 60     Multiple myeloma Brother         has amyloidosis as well     Cancer Brother         ? waldenstrom's     Kidney disease Brother         had a transplant     Other Sister         benign brain tumor     Bipolar disorder Sister      Kidney cancer Sister      Breast cancer Maternal Aunt      Heart attack Sister          age 74      Social History     Socioeconomic History     Marital status: Single      Spouse name: Not on file     Number of children: Not on file     Years of education: Not on file     Highest education level: Not on file   Occupational History     Occupation: retired - now SSI   Social Needs     Financial resource strain: Not on file     Food insecurity     Worry: Not on file     Inability: Not on file     Transportation needs     Medical: Not on file     Non-medical: Not on file   Tobacco Use     Smoking status: Current Some Day Smoker     Packs/day: 0.10     Years: 20.00     Pack years: 2.00     Smokeless tobacco: Never Used   Substance and Sexual Activity     Alcohol use: Yes     Alcohol/week: 2.0 standard drinks     Types: 2 Glasses of wine per week     Drug use: No     Sexual activity: Not Currently   Lifestyle     Physical activity     Days per week: Not on file     Minutes per session: Not on file     Stress: Not on file   Relationships     Social connections     Talks on phone: Not on file     Gets together: Not on file     Attends Sikh service: Not on file     Active member of club or organization: Not on file     Attends meetings of clubs or organizations: Not on file     Relationship status: Not on file     Intimate partner violence     Fear of current or ex partner: Not on file     Emotionally abused: Not on file     Physically abused: Not on file     Forced sexual activity: Not on file   Other Topics Concern     Not on file   Social History Narrative    She is single, lives alone, and does not have children.  She does not smoke cigarettes and occasionally has an alcoholic beverage. She has worked as independent  and part-time at [employer].  She became disabled in August 2016.       Current Outpatient Medications   Medication Sig Dispense Refill     allopurinoL (ZYLOPRIM) 100 MG tablet Take 1 tablet (100 mg total) by mouth daily. 90 tablet 3     anastrozole (ARIMIDEX) 1 mg tablet Take 1 tablet (1 mg total) by mouth daily. 90 tablet 3     ascorbic acid,  "vitamin C, (VITAMIN C) 1000 MG tablet Take 1,000 mg by mouth daily.       atorvastatin (LIPITOR) 20 MG tablet Take 1 tablet (20 mg total) by mouth daily. 90 tablet 3     calcium-vitamin D (CALCIUM-VITAMIN D) 500 mg(1,250mg) -200 unit per tablet Take 1 tablet by mouth daily.        cholecalciferol, vitamin D3, 5,000 unit Tab Take 10,000 Units by mouth.        escitalopram oxalate (LEXAPRO) 10 MG tablet Take 1 tablet (10 mg total) by mouth daily. 90 tablet 3     ibuprofen (ADVIL,MOTRIN) 200 MG tablet Take 200 mg by mouth every 6 (six) hours as needed for pain or headaches.       loratadine (CLARITIN) 10 mg tablet Take 10 mg by mouth daily.       losartan (COZAAR) 100 MG tablet Take 1 tablet (100 mg total) by mouth daily. 90 tablet 3     triamcinolone (KENALOG) 0.1 % cream APPLY EXTERNALLY TO RASH DAILY AS NEEDED 45 g 0     No current facility-administered medications for this visit.       Objective:   Vital Signs:   Visit Vitals  /88   Pulse 86   Ht 5' 9\" (1.753 m)   Wt 197 lb (89.4 kg)   SpO2 98%   BMI 29.09 kg/m         EKG: Pending      VisionScreening:   Hearing Screening    125Hz 250Hz 500Hz 1000Hz 2000Hz 3000Hz 4000Hz 6000Hz 8000Hz   Right ear:            Left ear:               Visual Acuity Screening    Right eye Left eye Both eyes   Without correction:      With correction: 20/40 20/40 20/40        PHYSICAL EXAM  General:  Patient is alert and in no apparent distress.  Neck:  Supple with no adenopathy or thyroid abnormality noted.  Cardiovascular:  Regular rate and rhythm, normal S1/S2, no murmurs, rubs, or gallop.  Pulmonary:  Lungs are clear to auscultation bilaterally with normal respiratory effort.  Gastrointestinal:  Abdomen is soft, non-tender, non-distended, with no organomegaly, rebound or guarding.  Extremities:  No joint abnormalities with no LE edema.  Strong dorsalis pedis pulses.  Neurologic Cranial nerves are intact.  No focal deficits.  Psychiatric:  Pleasant, no confusion or agitation "   Skin:  Warm and well perfused with no rashes or abnormalities.  Breast: No masses are noted.  There is fullness that is noted in the left axilla but does not feel like a mass.      Assessment Results 7/16/2020   Activities of Daily Living No help needed   Instrumental Activities of Daily Living No help needed   Mini Cog Total Score 5   Some recent data might be hidden     A Mini Cog score of 0-2 suggests the possibility of dementia, score of 3-5 suggests no dementia    Identified Health Risks:     She is at risk for lack of exercise and has been provided with information to increase physical activity for the benefit of her well-being.  Information regarding advance directives (living banda), including where she can download the appropriate form, was provided to the patient via the AVS.

## 2021-06-10 NOTE — PROGRESS NOTES
"[facility] Follow Up Note    Dorita Stringer   61 y.o. female    Date of Visit: 4/13/2017    Chief Complaint   Patient presents with     Follow-up     Subjective  Dorita comes in today for follow-up of gout and her brain injury.  She recently had a gouty flare of her knee and came back with an elevated uric acid level.  We treated her with some prednisone and it has calmed down.  She comes in today to discuss further treatment.  She continues to be under the care of neurology and the concussion clinic after her head injury in August of last year.  Her tremor and visual disturbance has not improved from this.  She is in the process of applying for disability as she is unable to work.  Her siblings are helping her out financially.  Discontinued her lisinopril/HCTZ and put her on 50 mg of losartan after the last visit due to gout exacerbations potentially caused by the HCTZ.  We originally started that medication a few years ago and it helped with her headaches, these headaches have now returned but she also seems to be suffering from some allergies in the air.    ROS A comprehensive review of systems was performed and was otherwise negative    Social History:   Social History     Social History Narrative    She is single, lives alone, and does not have children.  She does not smoke cigarettes and occasionally has an alcoholic beverage. She has worked as independent  and part-time at [employer].  She became disabled in August 2016.       Medications were reconciled.  Allergies, social and family history, and the problem list were all reviewed and updated.    Old records reviewed: Records from the brain injury clinic and neurology    Exam  General Appearance: Pleasant and alert  Vitals:    04/13/17 1042   BP: 138/80   Pulse: 72   Resp: 10   SpO2: 97%   Weight: 188 lb (85.3 kg)   Height: 5' 8.5\" (1.74 m)      Body mass index is 28.17 kg/(m^2).  Wt Readings from Last 3 Encounters: "   04/13/17 188 lb (85.3 kg)   02/13/17 180 lb (81.6 kg)   12/30/16 179 lb (81.2 kg)     HEENT: Sclera are clear.   Lungs: Normal respirations  Abdomen: Soft and nondistended  Extremities: No edema, mild erythema over the left first metatarsal, no warmth  Skin: No rashes  Neuro: Moves all extremities and has facial symmetry  Gait: Ambulates with a normal gait    Assessment/Plan  1. Essential hypertension with goal blood pressure less than 140/90  Pressure is still not at goal.  Increase the losartan to 100 mg daily.  If we can bring on her uric acid level with the allopurinol, and her headaches are not resolved by below, could consider going back to lisinopril/HCTZ or adding HCTZ to the losartan.    2. Gout  Start her on allopurinol 100 mg daily and have her return to clinic in 1 month.    3. Frequent headaches  As above, could be secondary to allergies and will try her on some fluticasone spray for now.  She will also start back on Claritin daily that she has stopped.    4. Post concussion syndrome  She continues to work with neurology in the concussion clinic and is currently undergoing some visual retraining but she does not feel like it is going to be successful.  She will likely be permanently disabled because of this.    5. Tremor  Somewhat relieved with gabapentin but still a problem, mainly of her head.  Caused by her previous head trauma.      Maria Malcolm MD  4/13/2017    Much or all of the text in this note was generated through the use of Dragon Dictate voice-to-text software. Errors in spelling or words which seem out of context are unintentional. Sound alike errors, in particular, may have escaped editing.

## 2021-06-11 NOTE — PROGRESS NOTES
Psychology Discharge Note    Patient has not followed up with writer since most recent psychotherapy appointment in February 2017.  She is considered discharged from this writer's psychological care at this time.      Provider Name: Miriam Haley PsyD, ADRIANA  Date: 7/10/2017

## 2021-06-16 PROBLEM — G31.84 MILD NEUROCOGNITIVE DISORDER: Status: ACTIVE | Noted: 2018-12-12

## 2021-06-16 PROBLEM — F41.9 ANXIETY: Status: ACTIVE | Noted: 2020-07-16

## 2021-06-17 NOTE — PROGRESS NOTES
"Chief complaint: Here for a physical    History of present illness:   Dorita comes in today for a physical.  She has had a difficult year and has undergone a lot of therapy for her postconcussive/traumatic brain injury in which she had tremors.  She is now off of gabapentin and is completing therapy.  The tremors come out when she is overstimulated and trying to multitask.  She is still not working but is hoping to do some type of work in the future.  She [place] [facility] [place] and Dr. Santoro at [facility] neurology.    Social history:   Social History     Social History Narrative    She is single, lives alone, and does not have children.  She does not smoke cigarettes and occasionally has an alcoholic beverage. She has worked as independent  and part-time at [employer].  She became disabled in 2016.       Family history:    Family History   Problem Relation Age of Onset     Breast cancer Mother       age 46     Heart attack Father       age 60     Multiple myeloma Brother      has amyloidosis as well     Cancer Brother      ? waldenstrom's     Kidney disease Brother      had a transplant     Other Sister      benign brain tumor     Bipolar disorder Sister      Kidney cancer Sister      Breast cancer Maternal Aunt      Heart attack Sister       age 74       Review of systems:   Please see above,  The rest of the review of systems are negative for all systems.    Current allergies, medications, and problem list are all reviewed and updated in the chart.    Physical exam:  Vitals:    18 1142   BP: 136/88   Patient Site: Left Arm   Patient Position: Sitting   Cuff Size: Adult Large   Pulse: 84   SpO2: 99%   Weight: 187 lb 6 oz (85 kg)   Height: 5' 9\" (1.753 m)     Body mass index is 27.67 kg/(m^2).  General Appearance:  Alert, cooperative, no distress, appears stated age   Head:  Normocephalic, without obvious abnormality, atraumatic   Eyes:  PERRL, " conjunctiva/corneas clear, EOM's intact   Ears:  Normal TM's and external ear canals, both ears   Nose: Nares normal, no drainage    Throat: Lips, mucosa, and tongue normal; teeth and gums normal   Neck: Supple, symmetrical, trachea midline, no adenopathy;  thyroid: not enlarged, symmetric, no tenderness/mass/nodules; no carotid bruit   Back:   Symmetric, no curvature, ROM normal   Lungs:   Clear to auscultation bilaterally, respirations unlabored   Breasts:  No breast masses, tenderness, asymmetry, or nipple discharge.   Heart:  Regular rate and rhythm, S1 and S2 normal, no murmur, rub, or gallop   Abdomen:   Soft, non-tender, positive bowel sounds, no masses, no organomegaly   Extremities: Extremities normal, atraumatic, no cyanosis or edema   Skin: Skin color, texture, turgor normal, no rashes or lesions   Lymph nodes: Cervical, supraclavicular, and axillary nodes normal   Neurologic:  nonfocal with facial symmetry      Assessment and plan:  1. Health care maintenance  She is due for a mammogram, tetanus shot is given and we discussed the shingles vaccine.  - Lipid Caroline    2. Visit for screening mammogram  - Mammo Screening Bilateral; Future    3. Vitamin D deficiency  - Vitamin D, Total (25-Hydroxy)    4. Gout  - Uric Acid    5. Hypertension  Stable on medication.  - Basic Metabolic Panel  - HM2(CBC w/o Differential)  - Thyroid Stimulating Hormone (TSH)  - Hepatic Profile  - Urinalysis-UC if Indicated    6. Malignant neoplasm of right breast  She is due to follow-up with hematology oncology and remains on anastrozole.    7. Tremor  We did discuss this, she is improving and is learning to accept this and to control it to some degree.        Maria Malcolm MD  Internal and Geriatric Medicine  [facility]  5/7/2018    Much or all of the text in this note was generated through the use of Dragon Dictate voice-to-text software. Errors in spelling or words which seem out of context are unintentional. Sound  alike errors, in particular, may have escaped editing.

## 2021-06-18 NOTE — PATIENT INSTRUCTIONS - HE
Patient Instructions by Addie Aldana MD at 7/16/2020 11:20 AM     Author: Addie Aldana MD Service: -- Author Type: Physician    Filed: 7/16/2020 11:44 AM Encounter Date: 7/16/2020 Status: Signed    : Addie Aldana MD (Physician)         Patient Education     Exercise for a Healthier Heart  You may wonder how you can improve the health of your heart. If youre thinking about exercise, youre on the right track. You dont need to become an athlete, but you do need a certain amount of brisk exercise to help strengthen your heart. If you have been diagnosed with a heart condition, your doctor may recommend exercise to help stabilize your condition. To help make exercise a habit, choose safe, fun activities.       Be sure to check with your health care provider before starting an exercise program.    Why exercise?  Exercising regularly offers many healthy rewards. It can help you do all of the following:    Improve your blood cholesterol levels to help prevent further heart trouble    Lower your blood pressure to help prevent a stroke or heart attack    Control diabetes, or reduce your risk of getting this disease    Improve your heart and lung function    Reach and maintain a healthy weight    Make your muscles stronger and more limber so you can stay active    Prevent falls and fractures by slowing the loss of bone mass (osteoporosis)    Manage stress better  Exercise tips  Ease into your routine. Set small goals. Then build on them.  Exercise on most days. Aim for a total of 150 or more minutes of moderate to  vigorous intensity activity each week. Consider 40 minutes, 3 to 4 times a week. For best results, activity should last for 40 minutes on average. It is OK to work up to the 40 minute period over time. Examples of moderate-intensity activity is walking one mile in 15 minutes or 30 to 45 minutes of yard work.  Step up your daily activity level. Along with your exercise program, try being more  active throughout the day. Walk instead of drive. Do more household tasks or yard work.  Choose one or more activities you enjoy. Walking is one of the easiest things you can do. You can also try swimming, riding a bike, or taking an exercise class.  Stop exercising and call your doctor if you:    Have chest pain or feel dizzy or lightheaded    Feel burning, tightness, pressure, or heaviness in your chest, neck, shoulders, back, or arms    Have unusual shortness of breath    Have increased joint or muscle pain    Have palpitations or an irregular heartbeat      [id number] [employer]. [address] [place] [address]. All rights reserved. This information is not intended as a substitute for professional medical care. Always follow your healthcare professional's instructions.         Patient Education   Understanding Advance Care Planning  Advance care planning is the process of deciding ones own future medical care. It helps ensure that if you cant speak for yourself, your wishes can still be carried out. The plan is a series of legal documents that note a persons wishes. The documents vary by state. Advance care planning may be done when a person has a serious illness that is expected to get worse. It may be done before major surgery. And it can help you and your family be prepared in case of a major illness or injury. Advance care planning helps with making decisions at these times.       A health care proxy is a person who acts as the voice of a patient when the patient cant speak for himself or herself. The name of this role varies by state. It may be called a Durable Medical Power of  or Durable Power of  for Healthcare. It may be called an agent, surrogate, or advocate. Or it may be called a representative or decision maker. It is an official duty that is identified by a legal document. The document also varies by state.    Why Is Advance Care Planning Important?  If a  person communicates their healthcare wishes:    They will be given medical care that matches their values and goals.    Their family members will not be forced to make decisions in a crisis with no guidance.  Creating a Plan  Making an advance care plan is often done in 3 steps:    Thinking about ones wishes. To create an advance care plan, you should think about what kind of medical treatment you would want if you lose the ability to communicate. Are there any situations in which you would refuse or stop treatment? Are there therapies you would want or not want? And whom do you want to make decisions for you? There are many places to learn more about how to plan for your care. Ask your doctor or  for resources.    Picking a health care proxy. This means choosing a trusted person to speak for you only when you cant speak for yourself. When you cannot make medical decisions, your proxy makes sure the instructions in your advance care plan are followed. A proxy does not make decisions based on his or her own opinions. They must put aside those opinions and values if needed, and carry out your wishes.    Filling out the legal documents. There are several kinds of legal documents for advance care planning. Each one tells health care providers your wishes. The documents may vary by state. They must be signed and may need to be witnessed or notarized. You can cancel or change them whenever you wish. Depending on your state, the documents may include a Healthcare Proxy form, Living Will, Durable Medical Power of , Advance Directive, or others.  The Familys Role  The best help a family can give is to support their loved ones wishes. Open and honest communication is vital. Family should express any concerns they have about the patients choices while the patient can still make decisions.    9960-1728 [employer]. [address] [place] [address], [place] [address]. All rights reserved. This  information is not intended as a substitute for professional medical care. Always follow your healthcare professional's instructions.         Also, [facility] offers a free, downloadable health care directive that allows you to share your treatment choices and personal preferences if you cannot communicate your wishes. It also allows you to appoint another person (called a health care agent) to make health care decisions if you are unable to do so. You can download an advance directive by going here: [email or web address]     Patient Education   Personalized Prevention Plan  You are due for the preventive services outlined below.  Your care team is available to assist you in scheduling these services.  If you have already completed any of these items, please share that information with your care team to update in your medical record.  Health Maintenance   Topic Date Due   ? ZOSTER VACCINES (1 of 2) 12/17/2004   ? COLORECTAL CANCER SCREENING  10/26/2019   ? PNEUMOCOCCAL IMMUNIZATION 65+ LOW/MEDIUM RISK (1 of 2 - PCV13) 12/17/2019   ? DXA SCAN  12/17/2019   ? MAMMOGRAM  07/18/2020   ? INFLUENZA VACCINE RULE BASED (1) 08/01/2020   ? MEDICARE ANNUAL WELLNESS VISIT  07/16/2021   ? FALL RISK ASSESSMENT  07/16/2021   ? LIPID  07/12/2024   ? ADVANCE CARE PLANNING  07/16/2025   ? TD 18+ HE  05/07/2028   ? TDAP ADULT ONE TIME DOSE  Completed   ? HEPATITIS B VACCINES  Aged Out   ? HEPATITIS C SCREENING  Discontinued   ? HIV SCREENING  Discontinued

## 2021-06-24 NOTE — TELEPHONE ENCOUNTER
Refill Approved    Rx renewed per Medication Renewal Policy. Medication was last renewed on 9/5/2018.    Parvez Brunson, [facility] Triage/Med Refill 3/13/2019     Requested Prescriptions   Pending Prescriptions Disp Refills     losartan (COZAAR) 100 MG tablet [Pharmacy Med Name: LOSARTAN 100MG TABLETS] 90 tablet 0     Sig: TAKE 1 TABLET(100 MG) BY MOUTH DAILY    Angiotensin Receptor Blocker Protocol Passed - 3/11/2019 10:11 AM       Passed - PCP or prescribing provider visit in past 12 months      Last office visit with prescriber/PCP: 5/16/2017 Maria Malcolm MD OR same dept: Visit date not found OR same specialty: 5/16/2017 Maria Malcolm MD  Last physical: 5/7/2018 Last MTM visit: Visit date not found   Next visit within 3 mo: Visit date not found  Next physical within 3 mo: Visit date not found  Prescriber OR PCP: Maria Malcolm MD  Last diagnosis associated with med order: 1. Hypertension  - losartan (COZAAR) 100 MG tablet [Pharmacy Med Name: LOSARTAN 100MG TABLETS]; TAKE 1 TABLET(100 MG) BY MOUTH DAILY  Dispense: 90 tablet; Refill: 0    If protocol passes may refill for 12 months if within 3 months of last provider visit (or a total of 15 months).            Passed - Serum potassium within the past 12 months    Lab Results   Component Value Date    Potassium 4.2 05/07/2018            Passed - Blood pressure filed in past 12 months    BP Readings from Last 1 Encounters:   05/07/18 136/88            Passed - Serum creatinine within the past 12 months    Creatinine   Date Value Ref Range Status   05/07/2018 0.84 0.60 - 1.10 mg/dL Final

## 2021-06-26 ENCOUNTER — HEALTH MAINTENANCE LETTER (OUTPATIENT)
Age: 66
End: 2021-06-26

## 2021-07-21 ENCOUNTER — OFFICE VISIT (OUTPATIENT)
Dept: INTERNAL MEDICINE | Facility: CLINIC | Age: 66
End: 2021-07-21
Payer: MEDICARE

## 2021-07-21 VITALS
BODY MASS INDEX: 28.35 KG/M2 | WEIGHT: 198 LBS | DIASTOLIC BLOOD PRESSURE: 83 MMHG | HEIGHT: 70 IN | OXYGEN SATURATION: 97 % | HEART RATE: 76 BPM | SYSTOLIC BLOOD PRESSURE: 127 MMHG

## 2021-07-21 DIAGNOSIS — Z23 NEED FOR PNEUMOCOCCAL VACCINATION: ICD-10-CM

## 2021-07-21 DIAGNOSIS — R53.83 OTHER FATIGUE: ICD-10-CM

## 2021-07-21 DIAGNOSIS — F32.5 MAJOR DEPRESSIVE DISORDER WITH SINGLE EPISODE, IN FULL REMISSION (H): ICD-10-CM

## 2021-07-21 DIAGNOSIS — E78.5 HYPERLIPIDEMIA LDL GOAL <100: ICD-10-CM

## 2021-07-21 DIAGNOSIS — Z00.00 ROUTINE GENERAL MEDICAL EXAMINATION AT A HEALTH CARE FACILITY: Primary | ICD-10-CM

## 2021-07-21 DIAGNOSIS — R73.03 PREDIABETES: ICD-10-CM

## 2021-07-21 DIAGNOSIS — E55.9 VITAMIN D DEFICIENCY: ICD-10-CM

## 2021-07-21 DIAGNOSIS — R94.5 ABNORMAL RESULTS OF LIVER FUNCTION STUDIES: ICD-10-CM

## 2021-07-21 DIAGNOSIS — D75.89 MACROCYTOSIS WITHOUT ANEMIA: ICD-10-CM

## 2021-07-21 DIAGNOSIS — I10 BENIGN ESSENTIAL HYPERTENSION: ICD-10-CM

## 2021-07-21 DIAGNOSIS — C50.911 MALIGNANT NEOPLASM OF RIGHT FEMALE BREAST, UNSPECIFIED ESTROGEN RECEPTOR STATUS, UNSPECIFIED SITE OF BREAST (H): ICD-10-CM

## 2021-07-21 LAB
ALBUMIN SERPL-MCNC: 4.2 G/DL (ref 3.5–5)
ALP SERPL-CCNC: 148 U/L (ref 45–120)
ALT SERPL W P-5'-P-CCNC: 64 U/L (ref 0–45)
ANION GAP SERPL CALCULATED.3IONS-SCNC: 12 MMOL/L (ref 5–18)
AST SERPL W P-5'-P-CCNC: 44 U/L (ref 0–40)
BASOPHILS # BLD AUTO: 0.1 10E3/UL (ref 0–0.2)
BASOPHILS NFR BLD AUTO: 1 %
BILIRUB DIRECT SERPL-MCNC: 0.3 MG/DL
BILIRUB SERPL-MCNC: 0.9 MG/DL (ref 0–1)
BUN SERPL-MCNC: 13 MG/DL (ref 8–22)
CALCIUM SERPL-MCNC: 10.3 MG/DL (ref 8.5–10.5)
CHLORIDE BLD-SCNC: 102 MMOL/L (ref 98–107)
CHOLEST SERPL-MCNC: 234 MG/DL
CO2 SERPL-SCNC: 25 MMOL/L (ref 22–31)
CREAT SERPL-MCNC: 0.87 MG/DL (ref 0.6–1.1)
EOSINOPHIL # BLD AUTO: 0.6 10E3/UL (ref 0–0.7)
EOSINOPHIL NFR BLD AUTO: 8 %
ERYTHROCYTE [DISTWIDTH] IN BLOOD BY AUTOMATED COUNT: 12.2 % (ref 10–15)
ERYTHROCYTE [SEDIMENTATION RATE] IN BLOOD BY WESTERGREN METHOD: 6 MM/HR (ref 0–20)
FASTING STATUS PATIENT QL REPORTED: YES
FOLATE SERPL-MCNC: 8 NG/ML
GFR SERPL CREATININE-BSD FRML MDRD: 70 ML/MIN/1.73M2
GLUCOSE BLD-MCNC: 151 MG/DL (ref 70–125)
HCT VFR BLD AUTO: 43.1 % (ref 35–47)
HDLC SERPL-MCNC: 52 MG/DL
HGB BLD-MCNC: 14.5 G/DL (ref 11.7–15.7)
IMM GRANULOCYTES # BLD: 0 10E3/UL
IMM GRANULOCYTES NFR BLD: 0 %
LDLC SERPL CALC-MCNC: 122 MG/DL
LYMPHOCYTES # BLD AUTO: 1.7 10E3/UL (ref 0.8–5.3)
LYMPHOCYTES NFR BLD AUTO: 22 %
MCH RBC QN AUTO: 33.2 PG (ref 26.5–33)
MCHC RBC AUTO-ENTMCNC: 33.6 G/DL (ref 31.5–36.5)
MCV RBC AUTO: 99 FL (ref 78–100)
MONOCYTES # BLD AUTO: 0.6 10E3/UL (ref 0–1.3)
MONOCYTES NFR BLD AUTO: 7 %
NEUTROPHILS # BLD AUTO: 5.1 10E3/UL (ref 1.6–8.3)
NEUTROPHILS NFR BLD AUTO: 62 %
PLATELET # BLD AUTO: 249 10E3/UL (ref 150–450)
POTASSIUM BLD-SCNC: 4.6 MMOL/L (ref 3.5–5)
PROT SERPL-MCNC: 7.3 G/DL (ref 6–8)
RBC # BLD AUTO: 4.37 10E6/UL (ref 3.8–5.2)
SODIUM SERPL-SCNC: 139 MMOL/L (ref 136–145)
TRIGL SERPL-MCNC: 299 MG/DL
TSH SERPL DL<=0.005 MIU/L-ACNC: 2.55 UIU/ML (ref 0.3–5)
VIT B12 SERPL-MCNC: 308 PG/ML (ref 213–816)
WBC # BLD AUTO: 8.1 10E3/UL (ref 4–11)

## 2021-07-21 PROCEDURE — 85025 COMPLETE CBC W/AUTO DIFF WBC: CPT | Performed by: INTERNAL MEDICINE

## 2021-07-21 PROCEDURE — 82607 VITAMIN B-12: CPT | Performed by: INTERNAL MEDICINE

## 2021-07-21 PROCEDURE — 80061 LIPID PANEL: CPT | Performed by: INTERNAL MEDICINE

## 2021-07-21 PROCEDURE — 85652 RBC SED RATE AUTOMATED: CPT | Performed by: INTERNAL MEDICINE

## 2021-07-21 PROCEDURE — 82746 ASSAY OF FOLIC ACID SERUM: CPT | Performed by: INTERNAL MEDICINE

## 2021-07-21 PROCEDURE — 82248 BILIRUBIN DIRECT: CPT | Performed by: INTERNAL MEDICINE

## 2021-07-21 PROCEDURE — 90732 PPSV23 VACC 2 YRS+ SUBQ/IM: CPT | Performed by: INTERNAL MEDICINE

## 2021-07-21 PROCEDURE — 84443 ASSAY THYROID STIM HORMONE: CPT | Performed by: INTERNAL MEDICINE

## 2021-07-21 PROCEDURE — 82306 VITAMIN D 25 HYDROXY: CPT | Performed by: INTERNAL MEDICINE

## 2021-07-21 PROCEDURE — 36415 COLL VENOUS BLD VENIPUNCTURE: CPT | Performed by: INTERNAL MEDICINE

## 2021-07-21 PROCEDURE — 80053 COMPREHEN METABOLIC PANEL: CPT | Performed by: INTERNAL MEDICINE

## 2021-07-21 PROCEDURE — 99207 PR ANNUAL WELLNESS VISIT, PPS, INITIAL STAT: CPT | Performed by: INTERNAL MEDICINE

## 2021-07-21 PROCEDURE — G0009 ADMIN PNEUMOCOCCAL VACCINE: HCPCS | Performed by: INTERNAL MEDICINE

## 2021-07-21 PROCEDURE — 99214 OFFICE O/P EST MOD 30 MIN: CPT | Mod: 25 | Performed by: INTERNAL MEDICINE

## 2021-07-21 RX ORDER — VITAMIN B COMPLEX
25 TABLET ORAL DAILY
COMMUNITY
Start: 2021-07-21

## 2021-07-21 ASSESSMENT — MIFFLIN-ST. JEOR: SCORE: 1510.43

## 2021-07-21 ASSESSMENT — ACTIVITIES OF DAILY LIVING (ADL): CURRENT_FUNCTION: NO ASSISTANCE NEEDED

## 2021-07-21 NOTE — PROGRESS NOTES
"SUBJECTIVE:   Dorita Stringer is a 65 year old female who presents for Preventive Visit.    Patient has been advised of split billing requirements and indicates understanding: Yes   Are you in the first 12 months of your Medicare coverage?  No    Healthy Habits:     In general, how would you rate your overall health?  Good    Frequency of exercise:  2-3 days/week    Duration of exercise:  Greater than 60 minutes    Do you usually eat at least 4 servings of fruit and vegetables a day, include whole grains    & fiber and avoid regularly eating high fat or \"junk\" foods?  Yes    Taking medications regularly:  Yes    Medication side effects:  Not applicable    Ability to successfully perform activities of daily living:  No assistance needed    Home Safety:  No safety concerns identified    Hearing Impairment:  Difficulty following a conversation in a noisy restaurant or crowded room and feel that people are mumbling or not speaking clearly    In the past 6 months, have you been bothered by leaking of urine?  No    In general, how would you rate your overall mental or emotional health?  Good      PHQ-2 Total Score: 0    Additional concerns today:  No    Do you feel safe in your environment? Yes    Have you ever done Advance Care Planning?No, advance care planning information given to patient to review.  Patient declined advance care planning discussion at this time.    Fall risk    Cognitive Screening   1) Repeat 3 items (Leader, Season, Table)    2) Clock draw: NORMAL  3) 3 item recall: Recalls 3 objects  Results: 3 items recalled: COGNITIVE IMPAIRMENT LESS LIKELY    Do you have sleep apnea, excessive snoring or daytime drowsiness?: no    Reviewed and updated as needed this visit by clinical staff    Reviewed and updated as needed this visit by Provider    Social History     Tobacco Use     Smoking status: Current Some Day Smoker     Packs/day: 0.10     Years: 20.00     Pack years: 2.00     Smokeless tobacco: Never " "Used   Substance Use Topics     Alcohol use: Yes     Alcohol/week: 2.0 standard drinks     If you drink alcohol do you typically have >3 drinks per day or >7 drinks per week? No    Alcohol Use 7/21/2021   Prescreen: >3 drinks/day or >7 drinks/week? No   Prescreen: >3 drinks/day or >7 drinks/week? -   No flowsheet data found.    Current providers sharing in care for this patient include: Patient Care Team:  Addie Aldana MD as PCP - General  Addie Aldana MD as Assigned PCP    The following health maintenance items are reviewed in Epic and correct as of today:  Health Maintenance Due   Topic Date Due     ANNUAL REVIEW OF HM ORDERS  Never done     DEPRESSION ACTION PLAN  Never done     PHQ-9  Never done     ZOSTER IMMUNIZATION (1 of 2) Never done     COLORECTAL CANCER SCREENING  10/26/2019     MAMMO SCREENING  08/06/2021       Any new diagnosis of family breast, ovarian, or bowel cancer? No    DEXA 8/6/2020:   Assessment:     1. The spine bone density L1-L2 with T-score -1.3 and significant artifacts. Due to arthritic changes bone mineral density of the lumbar spine maybe artificially elevated..  2. Femoral bone densities show left femoral neck T- score -1.8 and right femoral neck T-score -1.8.  3. Trabecular bone score indicates poor trabecular bone architecture.        64 y.o. female with LOW BONE DENSITY (OSTEOPENIA) and HIGH fracture risk, adjusted for the TBS, with major osteoporotic fracture risk 20.9% and hip fracture risk 5.3%.      Previous scan was done on a different machine and is not directly comparable with the current study.     Recommendations:  Appropriate evaluation and treatment recommended with follow up bone density scan after 1 year of active treatment.       Review of Systems  Rest of the review of systems is negative.    OBJECTIVE:   /83 (BP Location: Right arm, Patient Position: Right side, Cuff Size: Adult Large)   Pulse 76   Ht 1.765 m (5' 9.5\")   Wt 89.8 kg (198 lb)   SpO2 " "97%   BMI 28.82 kg/m   Estimated body mass index is 28.82 kg/m  as calculated from the following:    Height as of this encounter: 1.765 m (5' 9.5\").    Weight as of this encounter: 89.8 kg (198 lb).  Physical Exam  Patient is interactive and alert.  She is able to tell me his story.  Chest is clear, normal heart sounds, no murmurs.  No peripheral edema.    Diagnostic Test Results:  Labs reviewed in Epic  Results for orders placed or performed in visit on 07/21/21   Hepatic panel (Albumin, ALT, AST, Bili, Alk Phos, TP)     Status: Abnormal   Result Value Ref Range    Bilirubin Total 0.9 0.0 - 1.0 mg/dL    Bilirubin Direct 0.3 <=0.5 mg/dL    Protein Total 7.3 6.0 - 8.0 g/dL    Albumin 4.2 3.5 - 5.0 g/dL    Alkaline Phosphatase 148 (H) 45 - 120 U/L    AST 44 (H) 0 - 40 U/L    ALT 64 (H) 0 - 45 U/L   Basic metabolic panel  (Ca, Cl, CO2, Creat, Gluc, K, Na, BUN)     Status: Abnormal   Result Value Ref Range    Sodium 139 136 - 145 mmol/L    Potassium 4.6 3.5 - 5.0 mmol/L    Chloride 102 98 - 107 mmol/L    Carbon Dioxide (CO2) 25 22 - 31 mmol/L    Anion Gap 12 5 - 18 mmol/L    Urea Nitrogen 13 8 - 22 mg/dL    Creatinine 0.87 0.60 - 1.10 mg/dL    Calcium 10.3 8.5 - 10.5 mg/dL    Glucose 151 (H) 70 - 125 mg/dL    GFR Estimate 70 >60 mL/min/1.73m2   ESR: Erythrocyte sedimentation rate     Status: Normal   Result Value Ref Range    Erythrocyte Sedimentation Rate 6 0 - 20 mm/hr   TSH     Status: Normal   Result Value Ref Range    TSH 2.55 0.30 - 5.00 uIU/mL   Vitamin D Deficiency     Status: Normal   Result Value Ref Range    Vitamin D, Total (25-Hydroxy) 34 30 - 80 ug/L    Narrative    Deficiency <10.0 ug/L  Insufficiency 10.0-29.9 ug/L  Sufficiency 30.0-80.0 ug/L  Toxicity (possible) >100.0 ug/L    Vitamin B12     Status: Normal   Result Value Ref Range    Vitamin B12 308 213 - 816 pg/mL   Folate     Status: Normal   Result Value Ref Range    Folic Acid 8.0 >=3.5 ng/mL   Lipid Profile (Chol, Trig, HDL, LDL calc)     Status: " Abnormal   Result Value Ref Range    Cholesterol 234 (H) <=199 mg/dL    Triglycerides 299 (H) <=149 mg/dL    Direct Measure HDL 52 >=50 mg/dL    LDL Cholesterol Calculated 122 <=129 mg/dL    Patient Fasting > 8hrs? Yes    CBC with platelets and differential     Status: Abnormal   Result Value Ref Range    WBC Count 8.1 4.0 - 11.0 10e3/uL    RBC Count 4.37 3.80 - 5.20 10e6/uL    Hemoglobin 14.5 11.7 - 15.7 g/dL    Hematocrit 43.1 35.0 - 47.0 %    MCV 99 78 - 100 fL    MCH 33.2 (H) 26.5 - 33.0 pg    MCHC 33.6 31.5 - 36.5 g/dL    RDW 12.2 10.0 - 15.0 %    Platelet Count 249 150 - 450 10e3/uL    % Neutrophils 62 %    % Lymphocytes 22 %    % Monocytes 7 %    % Eosinophils 8 %    % Basophils 1 %    % Immature Granulocytes 0 %    Absolute Neutrophils 5.1 1.6 - 8.3 10e3/uL    Absolute Lymphocytes 1.7 0.8 - 5.3 10e3/uL    Absolute Monocytes 0.6 0.0 - 1.3 10e3/uL    Absolute Eosinophils 0.6 0.0 - 0.7 10e3/uL    Absolute Basophils 0.1 0.0 - 0.2 10e3/uL    Absolute Immature Granulocytes 0.0 <=0.0 10e3/uL   CBC with platelets and differential     Status: Abnormal    Narrative    The following orders were created for panel order CBC with platelets and differential.  Procedure                               Abnormality         Status                     ---------                               -----------         ------                     CBC with platelets and d...[[id number]]  Abnormal            Final result                 Please view results for these tests on the individual orders.       ASSESSMENT / PLAN:   Dorita was seen today for wellness visit and establish care.    Diagnoses and all orders for this visit:    Routine general medical examination at a health care facility, patient is up-to-date with vaccinations, except for the shingles vaccine.  I discussed that this is cheaper if she does it at a pharmacy this is in the clinic.  Patient agreed to the pneumonia vaccine today she has had pneumonia 13, July 16, 2020.  She is  given pneumonia 23 today.    Malignant neoplasm of right female breast, unspecified estrogen receptor status, unspecified site of breast (H), right lumpectomy, followed by 40 radiation treatments.  Surgery 2015.  Patient is on Arimidex 1 mg daily.    I referred patient to oncology: patient had a diagnosis of breast cancer in 2015, and started radiation after lumpectomy, then, and is on Arimidex, and saw Dr. Alberto though [facility]. Has not followed up as she had a TBI in 2016, and needed extensive rehab for that.  -     Oncology/Hematology Adult Referral; Future    [place], in 2016, [place] [facility] [place]  She had extensive rehabilitation for this.  She was placed on disability.  She saw her neurologist feeling oriented.  She follows up yearly. Dr. Blessing Pandey.    Vitamin D deficiency  -     vitamin D3 (CHOLECALCIFEROL) 25 mcg (1000 units) tablet; Take 1 tablet (25 mcg) by mouth daily  -     Vitamin D Deficiency; Future  -     Vitamin D Deficiency    Major depressive disorder with single episode, in full remission (H)    Abnormal results of liver function studies.  Patient is on Lipitor 20 mg daily.  ALT 71, and AST 55, July 16, 2020.  Discussed with patient, and we will recheck this today.  -     Hepatic panel (Albumin, ALT, AST, Bili, Alk Phos, TP); Future  -     Hepatic panel (Albumin, ALT, AST, Bili, Alk Phos, TP)    Benign essential hypertension, patient's blood pressure is 127/83.  Patient is on losartan 100 mg daily.  Blood pressure is controlled.  Potassium was elevated at 5 [upper level of normal], July 16, 2020.  -     Basic metabolic panel  (Ca, Cl, CO2, Creat, Gluc, K, Na, BUN); Future  -     Basic metabolic panel  (Ca, Cl, CO2, Creat, Gluc, K, Na, BUN)    Prediabetes, HbA1c 6.4% July 16, 2020.  Have asked for this to be repeated.  -     Hemoglobin A1c POCT, Enter/Edit Result    Macrocytosis without anemia, macrocytosis, MCV of 101, July 16, 2020.  Discussed with the patient what  "this might mean.  May mean that she is drinking too much alcohol.  -     CBC with platelets and differential; Future  -     ESR: Erythrocyte sedimentation rate; Future  -     Vitamin B12; Future  -     Folate; Future  -     CBC with platelets and differential  -     ESR: Erythrocyte sedimentation rate  -     Vitamin B12  -     Folate    Other fatigue  -     TSH; Future  -     TSH    Hyperlipidemia LDL goal <100  -     Lipid Profile (Chol, Trig, HDL, LDL calc); Future  -     Lipid Profile (Chol, Trig, HDL, LDL calc)    Need for pneumococcal vaccination  -     PPSV23, IM/SUBQ (2+ YRS) - [id number]        Patient has been advised of split billing requirements and indicates understanding: Yes  COUNSELING:      Estimated body mass index is 28.82 kg/m  as calculated from the following:    Height as of this encounter: 1.765 m (5' 9.5\").    Weight as of this encounter: 89.8 kg (198 lb).      She reports that she has been smoking. She has a 2.00 pack-year smoking history. She has never used smokeless tobacco.  Tobacco Cessation Action Plan:   discussion about smoking.    Results for IVONNE LAWTON (MRN [id number]) as of 7/21/2021 11:25   Ref. Range 7/16/2020 12:07   Sodium Latest Ref Range: 136 - 145 mmol/L 139   Potassium Latest Ref Range: 3.5 - 5.0 mmol/L 5.0   Chloride Latest Ref Range: 98 - 107 mmol/L 103   Carbon Dioxide Latest Ref Range: 22 - 31 mmol/L 29   Urea Nitrogen Latest Ref Range: 8 - 22 mg/dL 12   Creatinine Latest Ref Range: 0.60 - 1.10 mg/dL 0.81   GFR Estimate Latest Ref Range: >60 mL/min/1.73m2 >60   GFR Estimate If Black Latest Ref Range: >60 mL/min/1.73m2 >60   Calcium Latest Ref Range: 8.5 - 10.5 mg/dL 10.1   Anion Gap Latest Ref Range: 5 - 18 mmol/L 7   Albumin Latest Ref Range: 3.5 - 5.0 g/dL 4.1   Protein Total Latest Ref Range: 6.0 - 8.0 g/dL 7.1   Bilirubin Total Latest Ref Range: 0.0 - 1.0 mg/dL 0.5   Alkaline Phosphatase Latest Ref Range: 45 - 120 U/L 146 (H)   ALT Latest Ref Range: 0 - 45 " U/L 71 (H)   AST Latest Ref Range: 0 - 40 U/L 55 (H)   Hemoglobin A1C Latest Ref Range: 3.5 - 6.0 % 6.4 (H)   Cholesterol Latest Ref Range: <=199 mg/dL 233 (H)   Patient Fasting > 8hrs? Unknown Yes   HDL Cholesterol Latest Ref Range: >=50 mg/dL 52   LDL Cholesterol Calculated Latest Ref Range: <=129 mg/dL 104   Triglycerides Latest Ref Range: <=149 mg/dL 386 (H)       Appropriate preventive services were discussed with this patient, including applicable screening as appropriate for cardiovascular disease, diabetes, osteopenia/osteoporosis, and glaucoma.  As appropriate for age/gender, discussed screening for colorectal cancer, prostate cancer, breast cancer, and cervical cancer. Checklist reviewing preventive services available has been given to the patient.    Reviewed patients plan of care and provided an AVS. The Basic Care Plan (routine screening as documented in Health Maintenance) for [place] meets the Care Plan requirement. This Care Plan has been established and reviewed with the Patient.    Darlene Linda MD  [facility]    Identified Health Risks:

## 2021-07-21 NOTE — LETTER
September 21, 2021      Dorita Stringer  [address] [place] [address]        Dear ,    We are writing to inform you of your test results.    The elevated liver enzymes AST, ALT, and alkaline phosphatase are similar to the levels you have had over the last 3 years.  This likely represents fatty liver disease, which is commonly is seen with elevated triglycerides and prediabetes.    The management of fatty liver involves healthy eating, particularly reduction of carbohydrate intake, weight control, exercise, and avoidance of all alcohol.    You should stay on the atorvastatin, it is helping your cholesterol.  The atorvastatin is not hurting the liver.        Resulted Orders   Hepatic panel (Albumin, ALT, AST, Bili, Alk Phos, TP)   Result Value Ref Range    Bilirubin Total 0.9 0.0 - 1.0 mg/dL    Bilirubin Direct 0.3 <=0.5 mg/dL    Protein Total 7.3 6.0 - 8.0 g/dL    Albumin 4.2 3.5 - 5.0 g/dL    Alkaline Phosphatase 148 (H) 45 - 120 U/L    AST 44 (H) 0 - 40 U/L    ALT 64 (H) 0 - 45 U/L   Basic metabolic panel  (Ca, Cl, CO2, Creat, Gluc, K, Na, BUN)   Result Value Ref Range    Sodium 139 136 - 145 mmol/L    Potassium 4.6 3.5 - 5.0 mmol/L    Chloride 102 98 - 107 mmol/L    Carbon Dioxide (CO2) 25 22 - 31 mmol/L    Anion Gap 12 5 - 18 mmol/L    Urea Nitrogen 13 8 - 22 mg/dL    Creatinine 0.87 0.60 - 1.10 mg/dL    Calcium 10.3 8.5 - 10.5 mg/dL    Glucose 151 (H) 70 - 125 mg/dL    GFR Estimate 70 >60 mL/min/1.73m2      Comment:      As of July 11, 2021, eGFR is calculated by the CKD-EPI creatinine equation, without race adjustment. eGFR can be influenced by muscle mass, exercise, and diet. The reported eGFR is an estimation only and is only applicable if the renal function is stable.   ESR: Erythrocyte sedimentation rate   Result Value Ref Range    Erythrocyte Sedimentation Rate 6 0 - 20 mm/hr   TSH   Result Value Ref Range    TSH 2.55 0.30 - 5.00 uIU/mL   Vitamin D Deficiency   Result Value  Ref Range    Vitamin D, Total (25-Hydroxy) 34 30 - 80 ug/L    Narrative    Deficiency <10.0 ug/L  Insufficiency 10.0-29.9 ug/L  Sufficiency 30.0-80.0 ug/L  Toxicity (possible) >100.0 ug/L    Vitamin B12   Result Value Ref Range    Vitamin B12 308 213 - 816 pg/mL   Folate   Result Value Ref Range    Folic Acid 8.0 >=3.5 ng/mL   Lipid Profile (Chol, Trig, HDL, LDL calc)   Result Value Ref Range    Cholesterol 234 (H) <=199 mg/dL    Triglycerides 299 (H) <=149 mg/dL    Direct Measure HDL 52 >=50 mg/dL      Comment:      HDL Cholesterol Reference Range:     0-2 years:   No reference ranges established for patients under 2 years old  at [facility] for lipid analytes.    2-8 years:  Greater than 45 mg/dL     18 years and older:   Female: Greater than or equal to 50 mg/dL   Male:   Greater than or equal to 40 mg/dL    LDL Cholesterol Calculated 122 <=129 mg/dL    Patient Fasting > 8hrs? Yes    CBC with platelets and differential   Result Value Ref Range    WBC Count 8.1 4.0 - 11.0 10e3/uL    RBC Count 4.37 3.80 - 5.20 10e6/uL    Hemoglobin 14.5 11.7 - 15.7 g/dL    Hematocrit 43.1 35.0 - 47.0 %    MCV 99 78 - 100 fL    MCH 33.2 (H) 26.5 - 33.0 pg    MCHC 33.6 31.5 - 36.5 g/dL    RDW 12.2 10.0 - 15.0 %    Platelet Count 249 150 - 450 10e3/uL    % Neutrophils 62 %    % Lymphocytes 22 %    % Monocytes 7 %    % Eosinophils 8 %    % Basophils 1 %    % Immature Granulocytes 0 %    Absolute Neutrophils 5.1 1.6 - 8.3 10e3/uL    Absolute Lymphocytes 1.7 0.8 - 5.3 10e3/uL    Absolute Monocytes 0.6 0.0 - 1.3 10e3/uL    Absolute Eosinophils 0.6 0.0 - 0.7 10e3/uL    Absolute Basophils 0.1 0.0 - 0.2 10e3/uL    Absolute Immature Granulocytes 0.0 <=0.0 10e3/uL       If you have any questions or concerns, please call the clinic at the number listed above.       Sincerely,      Darlene Linda MD

## 2021-07-22 LAB — DEPRECATED CALCIDIOL+CALCIFEROL SERPL-MC: 34 UG/L (ref 30–80)

## 2021-08-03 PROBLEM — S06.9XAA MILD TRAUMATIC BRAIN INJURY (H): Status: RESOLVED | Noted: 2017-07-11 | Resolved: 2020-07-16

## 2021-10-11 ENCOUNTER — TELEPHONE (OUTPATIENT)
Dept: INTERNAL MEDICINE | Facility: CLINIC | Age: 66
End: 2021-10-11

## 2021-10-11 NOTE — TELEPHONE ENCOUNTER
CHUCHO for Pt informing her that the provider is out that day of her appointment 10/19 and we will need to reschedule

## 2021-10-16 ENCOUNTER — HEALTH MAINTENANCE LETTER (OUTPATIENT)
Age: 66
End: 2021-10-16
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
## 2022-05-11 DIAGNOSIS — F41.9 ANXIETY: ICD-10-CM

## 2022-05-11 DIAGNOSIS — M1A.00X0 IDIOPATHIC CHRONIC GOUT WITHOUT TOPHUS, UNSPECIFIED SITE: ICD-10-CM

## 2022-05-11 DIAGNOSIS — I10 ESSENTIAL HYPERTENSION: ICD-10-CM

## 2022-05-11 DIAGNOSIS — E78.00 HYPERCHOLESTEREMIA: ICD-10-CM

## 2022-05-11 RX ORDER — LOSARTAN POTASSIUM 100 MG/1
100 TABLET ORAL DAILY
Qty: 90 TABLET | Refills: 3 | Status: SHIPPED | OUTPATIENT
Start: 2022-05-11 | End: 2023-11-06

## 2022-05-11 RX ORDER — ALLOPURINOL 100 MG/1
100 TABLET ORAL DAILY
Qty: 90 TABLET | Refills: 3 | Status: SHIPPED | OUTPATIENT
Start: 2022-05-11 | End: 2023-06-02

## 2022-05-11 RX ORDER — ATORVASTATIN CALCIUM 20 MG/1
20 TABLET, FILM COATED ORAL DAILY
Qty: 90 TABLET | Refills: 3 | Status: SHIPPED | OUTPATIENT
Start: 2022-05-11 | End: 2023-08-28

## 2022-05-11 RX ORDER — ESCITALOPRAM OXALATE 10 MG/1
10 TABLET ORAL DAILY
Qty: 90 TABLET | Refills: 3 | Status: SHIPPED | OUTPATIENT
Start: 2022-05-11 | End: 2023-06-02

## 2022-05-11 NOTE — TELEPHONE ENCOUNTER
Reason for Call:  Medication or medication refill: Needs Rx's sent in for all of her medications    Do you use a [facility]? NO  Name of the pharmacy and phone number for the current request: [facility] [address] @ [facility] in [place], [phone]    Name of the medication requested: Pt needs new Rx's on all of her medications.    Other request: Pt is requesting refills on all of her medications until she is able to come in for her OV with Stacey Aquino on 06/03/2022. Pt was seeing Dr Linda and she is no longer with [facility].     Can we leave a detailed message on this number? YES    Phone number patient can be reached at: Home number on file [phone] (home)    Best Time: any    Call taken on 5/11/2022 at 2:21 PM by Daniella Ambriz

## 2022-06-03 ENCOUNTER — OFFICE VISIT (OUTPATIENT)
Dept: INTERNAL MEDICINE | Facility: CLINIC | Age: 67
End: 2022-06-03
Payer: MEDICARE

## 2022-06-03 VITALS
DIASTOLIC BLOOD PRESSURE: 88 MMHG | SYSTOLIC BLOOD PRESSURE: 130 MMHG | BODY MASS INDEX: 27.92 KG/M2 | OXYGEN SATURATION: 98 % | HEART RATE: 78 BPM | HEIGHT: 70 IN | WEIGHT: 195 LBS

## 2022-06-03 DIAGNOSIS — H18.513 FUCHS' CORNEAL DYSTROPHY OF BOTH EYES: ICD-10-CM

## 2022-06-03 DIAGNOSIS — Z13.29 SCREENING FOR ENDOCRINE, NUTRITIONAL, METABOLIC AND IMMUNITY DISORDER: ICD-10-CM

## 2022-06-03 DIAGNOSIS — Z12.31 VISIT FOR SCREENING MAMMOGRAM: ICD-10-CM

## 2022-06-03 DIAGNOSIS — Z23 HIGH PRIORITY FOR 2019-NCOV VACCINE: ICD-10-CM

## 2022-06-03 DIAGNOSIS — Z13.228 SCREENING FOR ENDOCRINE, NUTRITIONAL, METABOLIC AND IMMUNITY DISORDER: ICD-10-CM

## 2022-06-03 DIAGNOSIS — E78.1 HYPERTRIGLYCERIDEMIA: ICD-10-CM

## 2022-06-03 DIAGNOSIS — Z13.0 SCREENING FOR ENDOCRINE, NUTRITIONAL, METABOLIC AND IMMUNITY DISORDER: ICD-10-CM

## 2022-06-03 DIAGNOSIS — Z12.11 SCREEN FOR COLON CANCER: ICD-10-CM

## 2022-06-03 DIAGNOSIS — C50.911 MALIGNANT NEOPLASM OF RIGHT FEMALE BREAST, UNSPECIFIED ESTROGEN RECEPTOR STATUS, UNSPECIFIED SITE OF BREAST (H): ICD-10-CM

## 2022-06-03 DIAGNOSIS — M1A.00X0 IDIOPATHIC CHRONIC GOUT WITHOUT TOPHUS, UNSPECIFIED SITE: ICD-10-CM

## 2022-06-03 DIAGNOSIS — E11.65 TYPE 2 DIABETES MELLITUS WITH HYPERGLYCEMIA, WITHOUT LONG-TERM CURRENT USE OF INSULIN (H): ICD-10-CM

## 2022-06-03 DIAGNOSIS — Z13.21 SCREENING FOR ENDOCRINE, NUTRITIONAL, METABOLIC AND IMMUNITY DISORDER: ICD-10-CM

## 2022-06-03 DIAGNOSIS — R79.89 ELEVATED LIVER FUNCTION TESTS: ICD-10-CM

## 2022-06-03 DIAGNOSIS — E66.3 OVERWEIGHT (BMI 25.0-29.9): ICD-10-CM

## 2022-06-03 DIAGNOSIS — F32.5 MAJOR DEPRESSIVE DISORDER WITH SINGLE EPISODE, IN FULL REMISSION (H): ICD-10-CM

## 2022-06-03 LAB
ALBUMIN SERPL-MCNC: 3.7 G/DL (ref 3.5–5)
ALP SERPL-CCNC: 134 U/L (ref 45–120)
ALT SERPL W P-5'-P-CCNC: 71 U/L (ref 0–45)
ANION GAP SERPL CALCULATED.3IONS-SCNC: 10 MMOL/L (ref 5–18)
AST SERPL W P-5'-P-CCNC: 45 U/L (ref 0–40)
BASOPHILS # BLD AUTO: 0.1 10E3/UL (ref 0–0.2)
BASOPHILS NFR BLD AUTO: 1 %
BILIRUB SERPL-MCNC: 0.4 MG/DL (ref 0–1)
BUN SERPL-MCNC: 14 MG/DL (ref 8–22)
CALCIUM SERPL-MCNC: 9.8 MG/DL (ref 8.5–10.5)
CHLORIDE BLD-SCNC: 103 MMOL/L (ref 98–107)
CHOLEST SERPL-MCNC: 238 MG/DL
CO2 SERPL-SCNC: 26 MMOL/L (ref 22–31)
CREAT SERPL-MCNC: 0.86 MG/DL (ref 0.6–1.1)
EOSINOPHIL # BLD AUTO: 0.7 10E3/UL (ref 0–0.7)
EOSINOPHIL NFR BLD AUTO: 9 %
ERYTHROCYTE [DISTWIDTH] IN BLOOD BY AUTOMATED COUNT: 11.7 % (ref 10–15)
FASTING STATUS PATIENT QL REPORTED: YES
GFR SERPL CREATININE-BSD FRML MDRD: 74 ML/MIN/1.73M2
GLUCOSE BLD-MCNC: 151 MG/DL (ref 70–125)
HBA1C MFR BLD: 7 % (ref 0–5.6)
HCT VFR BLD AUTO: 47.3 % (ref 35–47)
HDLC SERPL-MCNC: 46 MG/DL
HGB BLD-MCNC: 15.7 G/DL (ref 11.7–15.7)
IMM GRANULOCYTES # BLD: 0 10E3/UL
IMM GRANULOCYTES NFR BLD: 0 %
LDLC SERPL CALC-MCNC: 110 MG/DL
LDLC SERPL CALC-MCNC: ABNORMAL MG/DL
LIPASE SERPL-CCNC: 49 U/L (ref 0–52)
LYMPHOCYTES # BLD AUTO: 2.1 10E3/UL (ref 0.8–5.3)
LYMPHOCYTES NFR BLD AUTO: 27 %
MCH RBC QN AUTO: 32.8 PG (ref 26.5–33)
MCHC RBC AUTO-ENTMCNC: 33.2 G/DL (ref 31.5–36.5)
MCV RBC AUTO: 99 FL (ref 78–100)
MONOCYTES # BLD AUTO: 0.5 10E3/UL (ref 0–1.3)
MONOCYTES NFR BLD AUTO: 7 %
NEUTROPHILS # BLD AUTO: 4.4 10E3/UL (ref 1.6–8.3)
NEUTROPHILS NFR BLD AUTO: 57 %
PLATELET # BLD AUTO: 240 10E3/UL (ref 150–450)
POTASSIUM BLD-SCNC: 4.3 MMOL/L (ref 3.5–5)
PROT SERPL-MCNC: 7.2 G/DL (ref 6–8)
RBC # BLD AUTO: 4.78 10E6/UL (ref 3.8–5.2)
SODIUM SERPL-SCNC: 139 MMOL/L (ref 136–145)
TRIGL SERPL-MCNC: 446 MG/DL
WBC # BLD AUTO: 7.8 10E3/UL (ref 4–11)

## 2022-06-03 PROCEDURE — 83721 ASSAY OF BLOOD LIPOPROTEIN: CPT | Performed by: NURSE PRACTITIONER

## 2022-06-03 PROCEDURE — 85025 COMPLETE CBC W/AUTO DIFF WBC: CPT | Performed by: NURSE PRACTITIONER

## 2022-06-03 PROCEDURE — 80061 LIPID PANEL: CPT | Mod: 59 | Performed by: NURSE PRACTITIONER

## 2022-06-03 PROCEDURE — 83036 HEMOGLOBIN GLYCOSYLATED A1C: CPT | Performed by: NURSE PRACTITIONER

## 2022-06-03 PROCEDURE — 80053 COMPREHEN METABOLIC PANEL: CPT | Performed by: NURSE PRACTITIONER

## 2022-06-03 PROCEDURE — 99214 OFFICE O/P EST MOD 30 MIN: CPT | Mod: 25 | Performed by: NURSE PRACTITIONER

## 2022-06-03 PROCEDURE — 0054A COVID-19,PF,PFIZER (12+ YRS): CPT | Performed by: NURSE PRACTITIONER

## 2022-06-03 PROCEDURE — 83690 ASSAY OF LIPASE: CPT | Performed by: NURSE PRACTITIONER

## 2022-06-03 PROCEDURE — 36415 COLL VENOUS BLD VENIPUNCTURE: CPT | Performed by: NURSE PRACTITIONER

## 2022-06-03 PROCEDURE — 96127 BRIEF EMOTIONAL/BEHAV ASSMT: CPT | Performed by: NURSE PRACTITIONER

## 2022-06-03 PROCEDURE — 91305 COVID-19,PF,PFIZER (12+ YRS): CPT | Performed by: NURSE PRACTITIONER

## 2022-06-03 RX ORDER — SODIUM CHLORIDE 5 %
OINTMENT (GRAM) OPHTHALMIC (EYE)
COMMUNITY
Start: 2022-05-31 | End: 2023-03-27

## 2022-06-03 ASSESSMENT — PATIENT HEALTH QUESTIONNAIRE - PHQ9
SUM OF ALL RESPONSES TO PHQ QUESTIONS 1-9: 1
10. IF YOU CHECKED OFF ANY PROBLEMS, HOW DIFFICULT HAVE THESE PROBLEMS MADE IT FOR YOU TO DO YOUR WORK, TAKE CARE OF THINGS AT HOME, OR GET ALONG WITH OTHER PEOPLE: NOT DIFFICULT AT ALL
SUM OF ALL RESPONSES TO PHQ QUESTIONS 1-9: 1

## 2022-06-03 NOTE — PROGRESS NOTES
Assessment & Plan     Type 2 diabetes mellitus with hyperglycemia, without long-term current use of insulin (H): new onset type two diabetes, will update patient of this. A1C today was 7.0%. Will discuss diet and exercise, possibly starting metformin.   - Hemoglobin A1c  - Diabetic diet  - Exercise  - 3 month follow up.     Elevated liver function tests: AST, ALT, and Alk phosphatase was elevated at last check. No abdominal pain or jaundice. She is having three drinks per week. Will recheck labs, possible liver US.   - Comprehensive metabolic panel (BMP + Alb, Alk Phos, ALT, AST, Total. Bili, TP)  - Lipase    Hypertriglyceridemia: hx of this, will recheck her cholesterol and lipase.   - Lipid panel reflex to direct LDL Fasting  - Lipase.  - Diet and exercise.     Major depressive disorder with single episode, in full remission (H): hx of this. She continues on Lexapro. Stable.     Malignant neoplasm of right female breast, unspecified estrogen receptor status, unspecified site of breast (H)/Visit for screening mammogram: Lumpectomy in 2015 through [facility], will need authorization signed to release her records from there. She is still on Anastrazole, her 5 year davin would have been in 2020. Mammogram ordered today. This will need further follow up with Oncology, to stop the Anastrazole.   - *MA Screening Digital Bilateral    Fuchs' corneal dystrophy of both eyes: Found at her recent eye exam. Managed per Ophthalmology.     Idiopathic chronic gout without tophus, unspecified site: She continues on allopurinol. Stable.     Overweight (BMI 25.0-29.9): BMI today was 28.38. Encouraged diet and exercise.     Screening for endocrine, nutritional, metabolic and immunity disorder  - CBC with platelets and differential    Screen for colon cancer: Due in 2019, ordered today.   - Adult Gastro Ref - Procedure Only    High priority for 2019-nCoV vaccine: Given today.     BMI:   Estimated body mass index is 28.38 kg/m  as calculated  "from the following:    Height as of this encounter: 1.765 m (5' 9.5\").    Weight as of this encounter: 88.5 kg (195 lb).   Weight management plan: Discussed healthy diet and exercise guidelines    Return in about 3 months (around 9/3/2022) for Follow up, Routine preventive.    Stacey Aquino, [facility] KRISTEN Kevin is a 66 year old who presents for the following health issues     HPI     The patient presents today for follow up.    She does not want to establish care today. [place] is a 30 minute drive for her. She would really like to be seen at the [facility], number given for this clinic, she will try to call and see if they will get her in there. Discussed that not many providers are taking patients right now, especially in internal medicine. She will update provider if she needs to establish care in the future.    She was a patient of Dr. Aldana, then Dr. Linda.    She is fasted today, we will check her labs.    Reviewed her labs from last year, she did have elevated AST, ALT, and alkaline phosphatase. She never had this followed up on. Will recheck liver function today, if this remains elevated, will check a liver US.    She does have a history of an elevated blood sugar in the past, will check an A1C today.    She also has high triglycerides and an elevated total cholesterol, will recheck these today.    She is not on a statin medication, she has never been on any medication for her triglycerides in the past.    She reports that she was just diagnosed with Fuchs Corneal Dystrophy, both eyes. She sees [facility] in [place] for this.     Her last bone density scan was done 08/10/20, that showed an increased fracture risk, with osteopenia. She has not been on treatment for this. She is on calcium and vitamin D only. She will need an updated bone density scan in the future, and management of her osteoporosis.    She is overdue for a colonoscopy, " "last was done 10/26/2019; 3 year follow up ordered this today.    Her Mammogram is also due for follow up; last was done 08/06/2020; she has a history of breast cancer, she had a right sided breast lumpectomy on 05/18/2015. We did not obtain authorization for her [facility] records today. She continues on anastrozole 1mg daily. Suspect this can be stopped as she has been on this for over 5 years. She has not seen Oncology in a number of years (unless she is seeing [facility] for this still). She will need this further investigated.    She reports that she is smoking 1/4 of a pack per day, she is not interested in quitting.    She is having about 3 alcoholic beverages per week. No drug use.      Review of Systems   Constitutional, HEENT, cardiovascular, pulmonary, GI, , musculoskeletal, neuro, skin, endocrine and psych systems are negative, except as otherwise noted.      Objective    /88 (BP Location: Right arm, Patient Position: Sitting)   Pulse 78   Ht 1.765 m (5' 9.5\")   Wt 88.5 kg (195 lb)   SpO2 98%   BMI 28.38 kg/m    Body mass index is 28.38 kg/m .  Physical Exam   GENERAL: healthy, alert and no distress  EYES: Eyes grossly normal to inspection, PERRL and conjunctivae and sclerae normal  HENT: ear canals and TM's normal, nose and mouth without ulcers or lesions  NECK: no adenopathy, no asymmetry, masses, or scars and thyroid normal to palpation  RESP: lungs clear to auscultation - no rales, rhonchi or wheezes  CV: regular rate and rhythm, normal S1 S2, no S3 or S4, no murmur, click or rub, no peripheral edema and peripheral pulses strong  MS: no gross musculoskeletal defects noted, no edema  SKIN: no suspicious lesions or rashes  NEURO: Normal strength and tone, mentation intact and speech normal  PSYCH: mentation appears normal, affect normal/bright      "

## 2022-06-03 NOTE — PATIENT INSTRUCTIONS
Call [phone] to set up care [facility] with anyone who is taking patients.    Your labs are processing, I will release results on my chart.    To schedule your Colonoscopy call [phone].    To schedule your Mammogram call [phone].    If [facility] will not take you as as patient, I will see you back in 3 months for follow up for your Medicare Wellness visit, before then if anything comes up.

## 2022-06-10 ENCOUNTER — ANCILLARY PROCEDURE (OUTPATIENT)
Dept: MAMMOGRAPHY | Facility: CLINIC | Age: 67
End: 2022-06-10
Attending: NURSE PRACTITIONER
Payer: MEDICARE

## 2022-06-10 DIAGNOSIS — Z12.31 VISIT FOR SCREENING MAMMOGRAM: ICD-10-CM

## 2022-06-10 PROCEDURE — 77067 SCR MAMMO BI INCL CAD: CPT | Mod: TC | Performed by: RADIOLOGY

## 2022-08-05 ENCOUNTER — TRANSFERRED RECORDS (OUTPATIENT)
Dept: HEALTH INFORMATION MANAGEMENT | Facility: CLINIC | Age: 67
End: 2022-08-05

## 2022-08-17 ENCOUNTER — TRANSFERRED RECORDS (OUTPATIENT)
Dept: HEALTH INFORMATION MANAGEMENT | Facility: CLINIC | Age: 67
End: 2022-08-17

## 2022-09-07 ENCOUNTER — TRANSFERRED RECORDS (OUTPATIENT)
Dept: HEALTH INFORMATION MANAGEMENT | Facility: CLINIC | Age: 67
End: 2022-09-07

## 2022-09-08 ENCOUNTER — OFFICE VISIT (OUTPATIENT)
Dept: INTERNAL MEDICINE | Facility: CLINIC | Age: 67
End: 2022-09-08
Payer: MEDICARE

## 2022-09-08 VITALS
SYSTOLIC BLOOD PRESSURE: 128 MMHG | OXYGEN SATURATION: 97 % | BODY MASS INDEX: 27.63 KG/M2 | HEART RATE: 79 BPM | WEIGHT: 193 LBS | RESPIRATION RATE: 17 BRPM | DIASTOLIC BLOOD PRESSURE: 78 MMHG | HEIGHT: 70 IN

## 2022-09-08 DIAGNOSIS — R79.89 ELEVATED LIVER FUNCTION TESTS: ICD-10-CM

## 2022-09-08 DIAGNOSIS — F32.5 MAJOR DEPRESSIVE DISORDER WITH SINGLE EPISODE, IN FULL REMISSION (H): ICD-10-CM

## 2022-09-08 DIAGNOSIS — H25.9 SENILE CATARACT OF RIGHT EYE, UNSPECIFIED AGE-RELATED CATARACT TYPE: ICD-10-CM

## 2022-09-08 DIAGNOSIS — F41.9 ANXIETY: ICD-10-CM

## 2022-09-08 DIAGNOSIS — E11.65 TYPE 2 DIABETES MELLITUS WITH HYPERGLYCEMIA, WITHOUT LONG-TERM CURRENT USE OF INSULIN (H): ICD-10-CM

## 2022-09-08 DIAGNOSIS — I10 ESSENTIAL HYPERTENSION: ICD-10-CM

## 2022-09-08 DIAGNOSIS — Z01.818 PREOPERATIVE EXAMINATION: Primary | ICD-10-CM

## 2022-09-08 DIAGNOSIS — E78.00 HYPERCHOLESTEREMIA: ICD-10-CM

## 2022-09-08 DIAGNOSIS — M85.89 OSTEOPENIA OF MULTIPLE SITES: ICD-10-CM

## 2022-09-08 DIAGNOSIS — H18.513 FUCHS' CORNEAL DYSTROPHY OF BOTH EYES: ICD-10-CM

## 2022-09-08 DIAGNOSIS — E78.1 HYPERTRIGLYCERIDEMIA: ICD-10-CM

## 2022-09-08 PROBLEM — E11.9 DIABETES MELLITUS, TYPE 2 (H): Status: ACTIVE | Noted: 2022-09-08

## 2022-09-08 PROBLEM — S06.9XAA MILD TRAUMATIC BRAIN INJURY (H): Status: ACTIVE | Noted: 2017-07-11

## 2022-09-08 LAB
ALBUMIN SERPL BCG-MCNC: 4.5 G/DL (ref 3.5–5.2)
ALP SERPL-CCNC: 149 U/L (ref 35–104)
ALT SERPL W P-5'-P-CCNC: 39 U/L (ref 10–35)
ANION GAP SERPL CALCULATED.3IONS-SCNC: 10 MMOL/L (ref 7–15)
AST SERPL W P-5'-P-CCNC: 39 U/L (ref 10–35)
BASOPHILS # BLD AUTO: 0.1 10E3/UL (ref 0–0.2)
BASOPHILS NFR BLD AUTO: 1 %
BILIRUB SERPL-MCNC: 0.5 MG/DL
BUN SERPL-MCNC: 11.4 MG/DL (ref 8–23)
CALCIUM SERPL-MCNC: 10.1 MG/DL (ref 8.8–10.2)
CHLORIDE SERPL-SCNC: 102 MMOL/L (ref 98–107)
CREAT SERPL-MCNC: 0.86 MG/DL (ref 0.51–0.95)
DEPRECATED HCO3 PLAS-SCNC: 27 MMOL/L (ref 22–29)
EOSINOPHIL # BLD AUTO: 0.5 10E3/UL (ref 0–0.7)
EOSINOPHIL NFR BLD AUTO: 6 %
ERYTHROCYTE [DISTWIDTH] IN BLOOD BY AUTOMATED COUNT: 12.2 % (ref 10–15)
GFR SERPL CREATININE-BSD FRML MDRD: 74 ML/MIN/1.73M2
GLUCOSE SERPL-MCNC: 153 MG/DL (ref 70–99)
HBA1C MFR BLD: 7.1 % (ref 0–5.6)
HCT VFR BLD AUTO: 43.1 % (ref 35–47)
HGB BLD-MCNC: 14.5 G/DL (ref 11.7–15.7)
IMM GRANULOCYTES # BLD: 0 10E3/UL
IMM GRANULOCYTES NFR BLD: 0 %
LYMPHOCYTES # BLD AUTO: 2.1 10E3/UL (ref 0.8–5.3)
LYMPHOCYTES NFR BLD AUTO: 26 %
MCH RBC QN AUTO: 32.6 PG (ref 26.5–33)
MCHC RBC AUTO-ENTMCNC: 33.6 G/DL (ref 31.5–36.5)
MCV RBC AUTO: 97 FL (ref 78–100)
MONOCYTES # BLD AUTO: 0.5 10E3/UL (ref 0–1.3)
MONOCYTES NFR BLD AUTO: 6 %
NEUTROPHILS # BLD AUTO: 5.2 10E3/UL (ref 1.6–8.3)
NEUTROPHILS NFR BLD AUTO: 62 %
PLATELET # BLD AUTO: 278 10E3/UL (ref 150–450)
POTASSIUM SERPL-SCNC: 5 MMOL/L (ref 3.4–5.3)
PROT SERPL-MCNC: 7.4 G/DL (ref 6.4–8.3)
RBC # BLD AUTO: 4.45 10E6/UL (ref 3.8–5.2)
SODIUM SERPL-SCNC: 139 MMOL/L (ref 136–145)
WBC # BLD AUTO: 8.4 10E3/UL (ref 4–11)

## 2022-09-08 PROCEDURE — 82043 UR ALBUMIN QUANTITATIVE: CPT | Performed by: NURSE PRACTITIONER

## 2022-09-08 PROCEDURE — 83036 HEMOGLOBIN GLYCOSYLATED A1C: CPT | Performed by: NURSE PRACTITIONER

## 2022-09-08 PROCEDURE — 99214 OFFICE O/P EST MOD 30 MIN: CPT | Performed by: NURSE PRACTITIONER

## 2022-09-08 PROCEDURE — 80053 COMPREHEN METABOLIC PANEL: CPT | Performed by: NURSE PRACTITIONER

## 2022-09-08 PROCEDURE — 36415 COLL VENOUS BLD VENIPUNCTURE: CPT | Performed by: NURSE PRACTITIONER

## 2022-09-08 PROCEDURE — 85025 COMPLETE CBC W/AUTO DIFF WBC: CPT | Performed by: NURSE PRACTITIONER

## 2022-09-08 RX ORDER — METFORMIN HCL 500 MG
500 TABLET, EXTENDED RELEASE 24 HR ORAL
Qty: 90 TABLET | Refills: 0 | Status: SHIPPED | OUTPATIENT
Start: 2022-09-08 | End: 2022-12-04

## 2022-09-08 NOTE — PATIENT INSTRUCTIONS
Your labs are processing at this time, I will release results on my chart once they are back.    Call [phone] to set up your Bone density testing.    Scheduling will call you to set up the diabetic educator referral.    Start metformin daily with supper time. If having uncontrolled diarrhea, nausea, or vomiting please let me know.    You are okay for surgery!    I will see you back in November for follow up, before then if anything comes up.

## 2022-09-08 NOTE — PROGRESS NOTES
[facility]  [address] [address]  Phone: [phone]  Fax: [phone]  Primary Provider: Addie Aldana  Pre-op Performing Provider: YULIA SRNIIVASAN    PREOPERATIVE EVALUATION:  Today's date: 9/8/2022    Dorita Stringer is a 66 year old female who presents for a preoperative evaluation.    Surgical Information:  Surgery/Procedure: right eye cataract surgery     Surgery Location: [place] [facility]   Surgeon: Isrrael SAGE  Surgery Date: 10/3/22  Time of Surgery: pending   Where patient plans to recover: At home alone  Fax number for surgical facility:  [phone]    Type of Anesthesia Anticipated: Topical    Assessment & Plan     The proposed surgical procedure is considered LOW risk.    Preoperative examination: Completed today. Labs drawn. No COVID test is needed. EKG is not needed. Hold metformin the night before and AM of surgery.   - Comprehensive metabolic panel (BMP + Alb, Alk Phos, ALT, AST, Total. Bili, TP)  - CBC with platelets and differential    Senile cataract of right eye, unspecified age-related cataract type: To have the right eye cataract removed.     Fuchs' corneal dystrophy of both eyes: hx of this.     Essential hypertension: Blood pressure today in office was 128/78. She continues on Losartan. Stable.     Type 2 diabetes mellitus with hyperglycemia, without long-term current use of insulin (H): A1c today was 7.1%. To hold metformin the PM before and AM of surgery. Diabetic educator referral placed. She does admit to eating out a lot more lately. Urine Microalbumin was normal. Continue working on diet and exercise.   - Albumin Random Urine Quantitative with Creat Ratio  - HEMOGLOBIN A1C  - AMB Adult Diabetes Educator Referral  - metFORMIN (GLUCOPHAGE XR) 500 MG 24 hr tablet  Dispense: 90 tablet; Refill: 0    Osteopenia of multiple sites: Ordered today.   - DX Hip/Pelvis/Spine    Elevated liver function tests: hx of this in the  past. AST of 39; ALT of 39. Likely fatty liver related. Will check an US.  - US Abdomen Limited    Hypercholesteremia/Hypertriglyceridemia: She continues on Atorvastatin. Will check a liver ultrasound before increasing statin dose.    Major depressive disorder with single episode, in full remission (H): She continues on lexapro. Stable.     Anxiety: She continues on lexapro. Stable.     Risks and Recommendations:  The patient has the following additional risks and recommendations for perioperative complications:   - No identified additional risk factors other than previously addressed    Medication Instructions:  Patient is to take all scheduled medications on the day of surgery EXCEPT for modifications listed below:  Metformin, hold the PM before and AM of surgery.    RECOMMENDATION:  APPROVAL GIVEN to proceed with proposed procedure, without further diagnostic evaluation.    Subjective     HPI related to upcoming procedure: The patient presents today for a preoperative examination.    She will be having right eye cataract surgery done. She does have a history of Fuch's corneal dystrophy, so any eye surgery makes her a little anxious.    She reports that otherwise she has been doing well overall.    Discussed her elevated cholesterol, her ASCVD risk score was 28.74%. Will increase her atorvastatin.    She would like to be a full code.    She denies issues with anesthesia in the past.     Preop Questions 9/8/2022   1. Have you ever had a heart attack or stroke? No   2. Have you ever had surgery on your heart or blood vessels, such as a stent placement, a coronary artery bypass, or surgery on an artery in your head, neck, heart, or legs? No   3. Do you have chest pain with activity? No   4. Do you have a history of  heart failure? No   5. Do you currently have a cold, bronchitis or symptoms of other infection? No   6. Do you have a cough, shortness of breath, or wheezing? No   7. Do you or anyone in your family have  previous history of blood clots? No   8. Do you or does anyone in your family have a serious bleeding problem such as prolonged bleeding following surgeries or cuts? No   9. Have you ever had problems with anemia or been told to take iron pills? No   10. Have you had any abnormal blood loss such as black, tarry or bloody stools, or abnormal vaginal bleeding? No   11. Have you ever had a blood transfusion? No   12. Are you willing to have a blood transfusion if it is medically needed before, during, or after your surgery? Yes   13. Have you or any of your relatives ever had problems with anesthesia? No   14. Do you have sleep apnea, excessive snoring or daytime drowsiness? No   15. Do you have any artifical heart valves or other implanted medical devices like a pacemaker, defibrillator, or continuous glucose monitor? No   16. Do you have artificial joints? No   17. Are you allergic to latex? No       Health Care Directive:  Patient does not have a Health Care Directive or Living Will: Full Code    Preoperative Review of :   reviewed - no record of controlled substances prescribed.    Review of Systems  CONSTITUTIONAL: NEGATIVE for fever, chills, change in weight  INTEGUMENTARY/SKIN: NEGATIVE for worrisome rashes, moles or lesions  EYES: NEGATIVE for vision changes or irritation  ENT/MOUTH: NEGATIVE for ear, mouth and throat problems  RESP: NEGATIVE for significant cough or SOB  CV: NEGATIVE for chest pain, palpitations or peripheral edema  GI: NEGATIVE for nausea, abdominal pain, heartburn, or change in bowel habits  : NEGATIVE for frequency, dysuria, or hematuria  MUSCULOSKELETAL: NEGATIVE for significant arthralgias or myalgia  NEURO: NEGATIVE for weakness, dizziness or paresthesias  ENDOCRINE: NEGATIVE for temperature intolerance, skin/hair changes  HEME: NEGATIVE for bleeding problems  PSYCHIATRIC: NEGATIVE for changes in mood or affect    Patient Active Problem List    Diagnosis Date Noted     Diabetes  mellitus, type 2 (H) 09/08/2022     Priority: Medium     Major depressive disorder with single episode, in full remission (H) 06/03/2022     Priority: Medium     Anxiety 07/16/2020     Priority: Medium     Mild neurocognitive disorder 12/12/2018     Priority: Medium     Hypercholesteremia      Priority: Medium     Created by Conversion         Hypertension      Priority: Medium     Created by Conversion  Replacement Utility updated for latest IMO load         Mild traumatic brain injury (H) 07/11/2017     Priority: Medium     Vitamin D deficiency 10/31/2016     Priority: Medium     Polyp of colon 10/26/2016     Priority: Medium     Malignant neoplasm of right breast (H) 08/25/2016     Priority: Medium     2015 Lumpectomy with radiation, Sees Dr. Promise Alberto and is on   anastrozole      Formatting of this note might be different from the original.  2015 Lumpectomy with radiation, Sees Dr. Promise Alberto and is on anastrozole       Migraine headache      Priority: Medium     Created by Conversion  Replacement Utility updated for latest IMO load      Formatting of this note might be different from the original.  Created by Conversion    Replacement Utility updated for latest IMO load       Allergic Rhinitis      Priority: Medium     Created by Conversion  [facility] Annotation: Mar 17 2008  5:27PM -  ,  : seasonal  Replacement Utility updated for latest IMO load         Gout 04/03/2015     Priority: Medium     Rosacea 04/03/2015     Priority: Medium     Irritable Bowel Syndrome      Priority: Medium     Created by Conversion          Past Medical History:   Diagnosis Date     Acne      Allergic rhinitis      Gout      Hx of radiation therapy      Hyperlipemia      Idiopathic urticaria 10/31/2016     Irritable bowel syndrome      Malignant neoplasm of right breast (H) 8/25/2016     Migraine      Mild traumatic brain injury (H) 7/11/2017     Rosacea      Past Surgical History:   Procedure Laterality Date     BIOPSY  BREAST Right      ELBOW SURGERY       LUMPECTOMY BREAST Right      RADIATION IMPLANT, FEMALE       Current Outpatient Medications   Medication Sig Dispense Refill     metFORMIN (GLUCOPHAGE XR) 500 MG 24 hr tablet Take 1 tablet (500 mg) by mouth daily (with dinner) 90 tablet 0     allopurinol (ZYLOPRIM) 100 MG tablet Take 1 tablet (100 mg) by mouth daily 90 tablet 3     anastrozole (ARIMIDEX) 1 mg tablet [ANASTROZOLE (ARIMIDEX) 1 MG TABLET] Take 1 tablet (1 mg total) by mouth daily. 90 tablet 3     ascorbic acid, vitamin C, (VITAMIN C) 1000 MG tablet [ASCORBIC ACID, VITAMIN C, (VITAMIN C) 1000 MG TABLET] Take 1,000 mg by mouth daily.       atorvastatin (LIPITOR) 20 MG tablet Take 1 tablet (20 mg) by mouth daily 90 tablet 3     calcium-vitamin D (CALCIUM-VITAMIN D) 500 mg(1,250mg) -200 unit per tablet [CALCIUM-VITAMIN D (CALCIUM-VITAMIN D) 500 MG(1,250MG) -200 UNIT PER TABLET] Take 1 tablet by mouth daily.        escitalopram (LEXAPRO) 10 MG tablet Take 1 tablet (10 mg) by mouth daily 90 tablet 3     loratadine (CLARITIN) 10 mg tablet [LORATADINE (CLARITIN) 10 MG TABLET] Take 10 mg by mouth daily.       losartan (COZAAR) 100 MG tablet Take 1 tablet (100 mg) by mouth daily 90 tablet 3     sodium chloride (PATRICIA 128) 5 % ophthalmic ointment APPLY SMALL AMOUNT IN BOTH EYES AT BEDTIME       triamcinolone (KENALOG) 0.1 % cream [TRIAMCINOLONE (KENALOG) 0.1 % CREAM] APPLY EXTERNALLY TO RASH DAILY AS NEEDED 45 g 0     vitamin D3 (CHOLECALCIFEROL) 25 mcg (1000 units) tablet Take 1 tablet (25 mcg) by mouth daily         Allergies   Allergen Reactions     Other Food Allergy Itching and Swelling     Hazulnut     Blueberry [Vaccinium Angustifolium] Other (See Comments)     Codeine Unknown        Social History     Tobacco Use     Smoking status: Current Some Day Smoker     Packs/day: 0.10     Years: 20.00     Pack years: 2.00     Smokeless tobacco: Never Used   Substance Use Topics     Alcohol use: Yes     Alcohol/week: 2.0  "standard drinks     Family History   Problem Relation Age of Onset     Breast Cancer Mother          age 46     Coronary Artery Disease Father          age 60     Multiple myeloma Brother         has amyloidosis as well     Cancer Brother         ? waldenstrom's     Kidney Disease Brother         had a transplant     Other - See Comments Sister         benign brain tumor     Bipolar Disorder Sister      Kidney Cancer Sister      Breast Cancer Maternal Aunt      Coronary Artery Disease Sister          age 74     History   Drug Use No         Objective     /78   Pulse 79   Resp 17   Ht 1.778 m (5' 10\")   Wt 87.5 kg (193 lb)   SpO2 97%   BMI 27.69 kg/m      Physical Exam    GENERAL APPEARANCE: healthy, alert and no distress     EYES: EOMI, PERRL     HENT: ear canals and TM's normal and nose and mouth without ulcers or lesions     NECK: no adenopathy, no asymmetry, masses, or scars and thyroid normal to palpation     RESP: lungs clear to auscultation - no rales, rhonchi or wheezes     CV: regular rates and rhythm, normal S1 S2, no S3 or S4 and no murmur, click or rub     ABDOMEN:  soft, nontender, no HSM or masses and bowel sounds normal     MS: extremities normal- no gross deformities noted, no evidence of inflammation in joints, FROM in all extremities.     SKIN: no suspicious lesions or rashes     NEURO: Normal strength and tone, sensory exam grossly normal, mentation intact and speech normal     PSYCH: mentation appears normal. and affect normal/bright     LYMPHATICS: No cervical adenopathy    Recent Labs   Lab Test 22  0928 21  1200   HGB 15.7 14.5    249    139   POTASSIUM 4.3 4.6   CR 0.86 0.87   A1C 7.0*  --         Diagnostics:  No labs were ordered during this visit.  Recent Results (from the past 168 hour(s))   HEMOGLOBIN A1C    Collection Time: 22  1:43 PM   Result Value Ref Range    Hemoglobin A1C 7.1 (H) 0.0 - 5.6 %   Comprehensive metabolic panel (BMP " + Alb, Alk Phos, ALT, AST, Total. Bili, TP)    Collection Time: 09/08/22  1:43 PM   Result Value Ref Range    Sodium 139 136 - 145 mmol/L    Potassium 5.0 3.4 - 5.3 mmol/L    Creatinine 0.86 0.51 - 0.95 mg/dL    Urea Nitrogen 11.4 8.0 - 23.0 mg/dL    Chloride 102 98 - 107 mmol/L    Carbon Dioxide (CO2) 27 22 - 29 mmol/L    Anion Gap 10 7 - 15 mmol/L    Glucose 153 (H) 70 - 99 mg/dL    Calcium 10.1 8.8 - 10.2 mg/dL    Protein Total 7.4 6.4 - 8.3 g/dL    Albumin 4.5 3.5 - 5.2 g/dL    Bilirubin Total 0.5 <=1.2 mg/dL    Alkaline Phosphatase 149 (H) 35 - 104 U/L    AST 39 (H) 10 - 35 U/L    ALT 39 (H) 10 - 35 U/L    GFR Estimate 74 >60 mL/min/1.73m2   CBC with platelets and differential    Collection Time: 09/08/22  1:43 PM   Result Value Ref Range    WBC Count 8.4 4.0 - 11.0 10e3/uL    RBC Count 4.45 3.80 - 5.20 10e6/uL    Hemoglobin 14.5 11.7 - 15.7 g/dL    Hematocrit 43.1 35.0 - 47.0 %    MCV 97 78 - 100 fL    MCH 32.6 26.5 - 33.0 pg    MCHC 33.6 31.5 - 36.5 g/dL    RDW 12.2 10.0 - 15.0 %    Platelet Count 278 150 - 450 10e3/uL    % Neutrophils 62 %    % Lymphocytes 26 %    % Monocytes 6 %    % Eosinophils 6 %    % Basophils 1 %    % Immature Granulocytes 0 %    Absolute Neutrophils 5.2 1.6 - 8.3 10e3/uL    Absolute Lymphocytes 2.1 0.8 - 5.3 10e3/uL    Absolute Monocytes 0.5 0.0 - 1.3 10e3/uL    Absolute Eosinophils 0.5 0.0 - 0.7 10e3/uL    Absolute Basophils 0.1 0.0 - 0.2 10e3/uL    Absolute Immature Granulocytes 0.0 <=0.4 10e3/uL   Albumin Random Urine Quantitative with Creat Ratio    Collection Time: 09/08/22  1:58 PM   Result Value Ref Range    Albumin Urine mg/L 20.3 mg/L    Albumin Urine mg/g Cr 9.67 0.00 - 25.00 mg/g Cr    Creatinine Urine mg/dL 210.0 mg/dL      No EKG required for low risk surgery (cataract, skin procedure, breast biopsy, etc).    Revised Cardiac Risk Index (RCRI):  The patient has the following serious cardiovascular risks for perioperative complications:   - No serious cardiac risks = 0  points     RCRI Interpretation: 0 points: Class I (very low risk - 0.4% complication rate)           Signed Electronically by: Stacey Aquino CNP  Copy of this evaluation report is provided to requesting physician.

## 2022-09-09 LAB
CREAT UR-MCNC: 210 MG/DL
MICROALBUMIN UR-MCNC: 20.3 MG/L
MICROALBUMIN/CREAT UR: 9.67 MG/G CR (ref 0–25)

## 2022-10-01 ENCOUNTER — HEALTH MAINTENANCE LETTER (OUTPATIENT)
Age: 67
End: 2022-10-01

## 2022-10-17 ENCOUNTER — TRANSFERRED RECORDS (OUTPATIENT)
Dept: HEALTH INFORMATION MANAGEMENT | Facility: CLINIC | Age: 67
End: 2022-10-17

## 2022-10-24 ENCOUNTER — ALLIED HEALTH/NURSE VISIT (OUTPATIENT)
Dept: EDUCATION SERVICES | Facility: CLINIC | Age: 67
End: 2022-10-24
Payer: MEDICARE

## 2022-10-24 VITALS — BODY MASS INDEX: 28.78 KG/M2 | WEIGHT: 200.6 LBS

## 2022-10-24 DIAGNOSIS — E11.65 TYPE 2 DIABETES MELLITUS WITH HYPERGLYCEMIA, WITHOUT LONG-TERM CURRENT USE OF INSULIN (H): ICD-10-CM

## 2022-10-24 PROCEDURE — G0108 DIAB MANAGE TRN  PER INDIV: HCPCS

## 2022-10-24 PROCEDURE — 99207 PR NO CHARGE NURSE ONLY: CPT

## 2022-10-24 RX ORDER — LANCETS
EACH MISCELLANEOUS
Qty: 100 EACH | Refills: 4 | Status: SHIPPED | OUTPATIENT
Start: 2022-10-24 | End: 2023-12-04

## 2022-10-24 RX ORDER — BLOOD SUGAR DIAGNOSTIC
STRIP MISCELLANEOUS
Qty: 100 STRIP | Refills: 3 | Status: SHIPPED | OUTPATIENT
Start: 2022-10-24 | End: 2023-12-04

## 2022-10-24 NOTE — PATIENT INSTRUCTIONS
1. Eat 3 balanced meals each day - Monitor carb intake and limit to 45-60 grams per meal  This would be equal to 3-4 choices ~  1 choice = 15 grams    Do not wait longer than 4-5 hours to eat something  Snacks limit to no more than 30 grams of carbohydrates or 2 choices  Make sure you include protein source with each meal and at bedtime - this has been shown to help with blood glucose elevations    2. Check blood sugars once each day -please alternate times between a.m. fasting and 2 hours after dinner.   Fasting and before meal target is 80 - 130   2 hours after a meal target is < 180  remember to bring meter and log book to all appointments    3. Incorporate 30 minutes activity into each day - does not need to be all at one time & walking counts    4. Take diabetes medications as prescribed continue metformin XR as currently prescribed

## 2022-10-24 NOTE — LETTER
10/24/2022         RE: Dorita Stringer  [address]  [place] [address]        Dear Colleague,    Thank you for referring your patient, Dorita Stringer, to the [facility]. Please see a copy of my visit note below.    Diabetes Self-Management Education & Support    Presents for: Initial Assessment for new diagnosis    Type of Service: In Person Visit    Assessment Type:   ASSESSMENT:  Dorita is a very pleasant 66 year old who comes to clinic for a consult and to receive education regarding a new diagnosis of DMT2. She arrived today unaccompanied. Currently has been prescribed Metformin for therapy however reported today she has not yet started because she had confusion as to when to actually take it. Provided her education on this medication which included MOA, intended effects on BG and dosing schedule. Prior to appt today, Droita's most recent OV notes as well as lab results were reviewed. Dorita was very open and receptive to all information/ education provided today.    Patient's most recent   Lab Results   Component Value Date    A1C 7.1 09/08/2022     is not meeting goal of <7.0    Diabetes knowledge and skills assessment:       Continue education with the following diabetes management concepts: Healthy Eating, Being Active, Problem Solving, Reducing Risks and Healthy Coping    Based on learning assessment above, most appropriate setting for further diabetes education would be: Individual setting.      PLAN  1. Eat 3 balanced meals each day - Monitor carb intake and limit to 45-60 grams per meal  This would be equal to 3-4 choices ~  1 choice = 15 grams    Do not wait longer than 4-5 hours to eat something  Snacks limit to no more than 30 grams of carbohydrates or 2 choices  Make sure you include protein source with each meal and at bedtime - this has been shown to help with blood glucose elevations    2. Check blood sugars once each day -please alternate times  "between a.m. fasting and 2 hours after dinner.   Fasting and before meal target is 80 - 130   2 hours after a meal target is < 180  remember to bring meter and log book to all appointments    3. Incorporate 30 minutes activity into each day - does not need to be all at one time & walking counts    4. Take diabetes medications as prescribed continue metformin XR as currently prescribed       Topics to cover at upcoming visits: Healthy Eating, Being Active, Reducing Risks and Healthy Coping    Follow-up: 11/21/2022    See Care Plan for co-developed, patient-state behavior change goals.  AVS provided for patient today.    Education Materials Provided:  Living Healthy with Diabetes, Carbohydrate Counting and My Plate Planner      SUBJECTIVE/OBJECTIVE:  Presents for: Initial Assessment for new diagnosis  Accompanied by: Self  Diabetes education in the past 24 mo: No  Focus of Visit: Monitoring, Reducing Risks, Taking Medication, Healthy Coping, Healthy Eating, Problem Solving, Being Active, Diabetes Pathophysiology, Assistance w/ making life changes, Self-care behavioral goal setting  Diabetes type: Type 2  Date of diagnosis: 2022  Other concerns:: None (TBI in 2016)  Cultural Influences/Ethnic Background:  Not  or       Diabetes Symptoms & Complications:  Fatigue: Yes (unrelated to DM)  Neuropathy: No  Polydipsia: No  Autonomic neuropathy: No  Heart disease: No    Patient Problem List and Family Medical History reviewed for relevant medical history, current medical status, and diabetes risk factors.    Vitals:  Wt 91 kg (200 lb 9.6 oz)   BMI 28.78 kg/m    Estimated body mass index is 28.78 kg/m  as calculated from the following:    Height as of 9/8/22: 1.778 m (5' 10\").    Weight as of this encounter: 91 kg (200 lb 9.6 oz).   Last 3 BP:   BP Readings from Last 3 Encounters:   09/08/22 128/78   06/03/22 130/88   07/21/21 127/83       History   Smoking Status     Some Days     Packs/day: 0.10     Years: " 20.00     Types: Cigarettes   Smokeless Tobacco     Never       Labs:  Lab Results   Component Value Date    A1C 7.1 09/08/2022     Lab Results   Component Value Date     09/08/2022     06/03/2022     Lab Results   Component Value Date    LDL  06/03/2022      Comment:      Cannot estimate LDL when triglyceride exceeds 400 mg/dL     06/03/2022     Direct Measure HDL   Date Value Ref Range Status   06/03/2022 46 (L) >=50 mg/dL Final     Comment:     HDL Cholesterol Reference Range:     0-2 years:   No reference ranges established for patients under 2 years old  at [facility] for lipid analytes.    2-8 years:  Greater than 45 mg/dL     18 years and older:   Female: Greater than or equal to 50 mg/dL   Male:   Greater than or equal to 40 mg/dL   ]  GFR Estimate   Date Value Ref Range Status   09/08/2022 74 >60 mL/min/1.73m2 Final     Comment:     Effective December 21, 2021 eGFRcr in adults is calculated using the 2021 CKD-EPI creatinine equation which includes age and gender (Татьяна manzano al., NEJ, DOI: 10.1056/SUMZub1666699)   07/16/2020 >60 >60 mL/min/1.73m2 Final     GFR Estimate If Black   Date Value Ref Range Status   07/16/2020 >60 >60 mL/min/1.73m2 Final     Lab Results   Component Value Date    CR 0.86 09/08/2022     No results found for: MICROALBUMIN    Healthy Eating:  Healthy Eating Assessed Today: Yes  Cultural/Moravian diet restrictions?: No  Meal planning/habits: Low salt, Smaller portions  Breakfast: 10:00 am : will have coffee with 1/2 and 1/2  Lunch: 12-1 pm : if she goes out to eat , it is often to [employer] - at home may have frozen or premade meals  Dinner: 5-6 pm: frozen meals, premade if she cooks is usually chix/pork/hamburger with frozen mixed vegetables- some type of fruit  Snacks: handful of nuts before dinner - during day/hs: granola bar [facility] (20 CHO), [facility] ice cream bar  Other: + rice- wild or brown  Beverages: Water, Juice, Milk, Soda, Alcohol  (milk with Nestles Quik, soda is one small can gingerale, juice- cran or apple in am)  Has patient met with a dietitian in the past?: No    Being Active:  Being Active Assessed Today: Yes  Exercise:: Yes (reports she estimates she walks  few miles each day)  Days per week of moderate to strenuous exercise (like a brisk walk): 5  On average, minutes per day of exercise at this level: 40  How intense was your typical exercise? : Light (like stretching or slow walking)  Exercise Minutes per Week: 200  Barrier to exercise: Safety (sometimes balance issues)    Monitoring:  Did patient bring glucose meter to appointment? :  (provided today)  Blood Glucose Meter: Accu-chek  Times checking blood sugar at home (number): Never  Blood glucose trend: No change        Taking Medications:  Diabetes Medication(s)     Biguanides       metFORMIN (GLUCOPHAGE XR) 500 MG 24 hr tablet    Take 1 tablet (500 mg) by mouth daily (with dinner)     Patient not taking: Reported on 10/24/2022          Taking Medication Assessed Today: Yes  Current Treatments: Diet  Diabetes medication side effects?:  (has not yet started - had confusion with timing)    Problem Solving:  Problem Solving Assessed Today: Yes  Is the patient at risk for hypoglycemia?: No  Is the patient at risk for DKA?: No  Does patient have severe weather/disaster plan for diabetes management?: Not Needed  Does patient have sick day plan for diabetes management?: Not Needed              Reducing Risks:  Reducing Risks Assessed Today: Yes  Diabetes Risks: Age over 45 years, Sedentary Lifestyle, Family History  CAD Risks: Sedentary lifestyle, Post-menopausal, Hypertension, Diabetes Mellitus    Healthy Coping:  Healthy Coping Assessed Today: Yes  Emotional response to diabetes: Ready to learn  Informal Support system:: Family  Stage of change: PREPARATION (Decided to change - considering how)  Support resources: In-person Offerings  Patient Activation Measure Survey Score:  No  flowsheet data found.      Care Plan and Education Provided:  Patient was instructed on Accu-Chek Guide Me meter and was able to provide an accurate return demonstration. Patient's blood glucose reading today was 149 mg/dL. fasting  There are no care plans that you recently modified to display for this patient.      Thank you,  Sandra Hill RN [facility]  Certified Diabetes Care and   Visit type : DSMT        Time Spent: 90 minutes  Encounter Type: Individual    Any diabetes medication dose changes were made via the CDE Protocol per the patient's referring provider and primary care provider. A copy of this encounter was shared with the provider.    Much or all of the text in this note was generated through the use of the Dragon Dictate voice-to-text software.Errors in spelling or words which seem out of context are unintentional. Sound alike errors, in particular, may have escaped editing.

## 2022-10-25 NOTE — PROGRESS NOTES
Diabetes Self-Management Education & Support    Presents for: Initial Assessment for new diagnosis    Type of Service: In Person Visit    Assessment Type:   ASSESSMENT:  Dorita is a very pleasant 66 year old who comes to clinic for a consult and to receive education regarding a new diagnosis of DMT2. She arrived today unaccompanied. Currently has been prescribed Metformin for therapy however reported today she has not yet started because she had confusion as to when to actually take it. Provided her education on this medication which included MOA, intended effects on BG and dosing schedule. Prior to appt today, Dorita's most recent OV notes as well as lab results were reviewed. Dorita was very open and receptive to all information/ education provided today.    Patient's most recent   Lab Results   Component Value Date    A1C 7.1 09/08/2022     is not meeting goal of <7.0    Diabetes knowledge and skills assessment:       Continue education with the following diabetes management concepts: Healthy Eating, Being Active, Problem Solving, Reducing Risks and Healthy Coping    Based on learning assessment above, most appropriate setting for further diabetes education would be: Individual setting.      PLAN  1. Eat 3 balanced meals each day - Monitor carb intake and limit to 45-60 grams per meal  This would be equal to 3-4 choices ~  1 choice = 15 grams    Do not wait longer than 4-5 hours to eat something  Snacks limit to no more than 30 grams of carbohydrates or 2 choices  Make sure you include protein source with each meal and at bedtime - this has been shown to help with blood glucose elevations    2. Check blood sugars once each day -please alternate times between a.m. fasting and 2 hours after dinner.   Fasting and before meal target is 80 - 130   2 hours after a meal target is < 180  remember to bring meter and log book to all appointments    3. Incorporate 30 minutes activity into each day - does not need to be all at  "one time & walking counts    4. Take diabetes medications as prescribed continue metformin XR as currently prescribed       Topics to cover at upcoming visits: Healthy Eating, Being Active, Reducing Risks and Healthy Coping    Follow-up: 11/21/2022    See Care Plan for co-developed, patient-state behavior change goals.  AVS provided for patient today.    Education Materials Provided:  Living Healthy with Diabetes, Carbohydrate Counting and My Plate Planner      SUBJECTIVE/OBJECTIVE:  Presents for: Initial Assessment for new diagnosis  Accompanied by: Self  Diabetes education in the past 24 mo: No  Focus of Visit: Monitoring, Reducing Risks, Taking Medication, Healthy Coping, Healthy Eating, Problem Solving, Being Active, Diabetes Pathophysiology, Assistance w/ making life changes, Self-care behavioral goal setting  Diabetes type: Type 2  Date of diagnosis: 2022  Other concerns:: None (TBI in 2016)  Cultural Influences/Ethnic Background:  Not  or       Diabetes Symptoms & Complications:  Fatigue: Yes (unrelated to DM)  Neuropathy: No  Polydipsia: No  Autonomic neuropathy: No  Heart disease: No    Patient Problem List and Family Medical History reviewed for relevant medical history, current medical status, and diabetes risk factors.    Vitals:  Wt 91 kg (200 lb 9.6 oz)   BMI 28.78 kg/m    Estimated body mass index is 28.78 kg/m  as calculated from the following:    Height as of 9/8/22: 1.778 m (5' 10\").    Weight as of this encounter: 91 kg (200 lb 9.6 oz).   Last 3 BP:   BP Readings from Last 3 Encounters:   09/08/22 128/78   06/03/22 130/88   07/21/21 127/83       History   Smoking Status     Some Days     Packs/day: 0.10     Years: 20.00     Types: Cigarettes   Smokeless Tobacco     Never       Labs:  Lab Results   Component Value Date    A1C 7.1 09/08/2022     Lab Results   Component Value Date     09/08/2022     06/03/2022     Lab Results   Component Value Date    LDL  06/03/2022      " Comment:      Cannot estimate LDL when triglyceride exceeds 400 mg/dL     06/03/2022     Direct Measure HDL   Date Value Ref Range Status   06/03/2022 46 (L) >=50 mg/dL Final     Comment:     HDL Cholesterol Reference Range:     0-2 years:   No reference ranges established for patients under 2 years old  at [facility] for lipid analytes.    2-8 years:  Greater than 45 mg/dL     18 years and older:   Female: Greater than or equal to 50 mg/dL   Male:   Greater than or equal to 40 mg/dL   ]  GFR Estimate   Date Value Ref Range Status   09/08/2022 74 >60 mL/min/1.73m2 Final     Comment:     Effective December 21, 2021 eGFRcr in adults is calculated using the 2021 CKD-EPI creatinine equation which includes age and gender (Татьняа et al., NEJ, DOI: 10.1056/HJFXwt9472850)   07/16/2020 >60 >60 mL/min/1.73m2 Final     GFR Estimate If Black   Date Value Ref Range Status   07/16/2020 >60 >60 mL/min/1.73m2 Final     Lab Results   Component Value Date    CR 0.86 09/08/2022     No results found for: MICROALBUMIN    Healthy Eating:  Healthy Eating Assessed Today: Yes  Cultural/Protestant diet restrictions?: No  Meal planning/habits: Low salt, Smaller portions  Breakfast: 10:00 am : will have coffee with 1/2 and 1/2  Lunch: 12-1 pm : if she goes out to eat , it is often to [employer] lat[employer] - at home may have frozen or premade meals  Dinner: 5-6 pm: frozen meals, premade if she cooks is usually chix/pork/hamburger with frozen mixed vegetables- some type of fruit  Snacks: handful of nuts before dinner - during day/hs: granola bar [facility] (20 CHO), [facility] ice cream bar  Other: + rice- wild or brown  Beverages: Water, Juice, Milk, Soda, Alcohol (milk with Nestles Quik, soda is one small can gingerale, juice- cran or apple in am)  Has patient met with a dietitian in the past?: No    Being Active:  Being Active Assessed Today: Yes  Exercise:: Yes (reports she estimates she walks  few miles each day)  Days per week  of moderate to strenuous exercise (like a brisk walk): 5  On average, minutes per day of exercise at this level: 40  How intense was your typical exercise? : Light (like stretching or slow walking)  Exercise Minutes per Week: 200  Barrier to exercise: Safety (sometimes balance issues)    Monitoring:  Did patient bring glucose meter to appointment? :  (provided today)  Blood Glucose Meter: Accu-chek  Times checking blood sugar at home (number): Never  Blood glucose trend: No change        Taking Medications:  Diabetes Medication(s)     Biguanides       metFORMIN (GLUCOPHAGE XR) 500 MG 24 hr tablet    Take 1 tablet (500 mg) by mouth daily (with dinner)     Patient not taking: Reported on 10/24/2022          Taking Medication Assessed Today: Yes  Current Treatments: Diet  Diabetes medication side effects?:  (has not yet started - had confusion with timing)    Problem Solving:  Problem Solving Assessed Today: Yes  Is the patient at risk for hypoglycemia?: No  Is the patient at risk for DKA?: No  Does patient have severe weather/disaster plan for diabetes management?: Not Needed  Does patient have sick day plan for diabetes management?: Not Needed              Reducing Risks:  Reducing Risks Assessed Today: Yes  Diabetes Risks: Age over 45 years, Sedentary Lifestyle, Family History  CAD Risks: Sedentary lifestyle, Post-menopausal, Hypertension, Diabetes Mellitus    Healthy Coping:  Healthy Coping Assessed Today: Yes  Emotional response to diabetes: Ready to learn  Informal Support system:: Family  Stage of change: PREPARATION (Decided to change - considering how)  Support resources: In-person Offerings  Patient Activation Measure Survey Score:  No flowsheet data found.      Care Plan and Education Provided:  Patient was instructed on Accu-Chek Guide Me meter and was able to provide an accurate return demonstration. Patient's blood glucose reading today was 149 mg/dL. fasting  There are no care plans that you recently  modified to display for this patient.      Thank you,  Sandra Hill RN [facility]  Certified Diabetes Care and   Visit type : DSMT        Time Spent: 90 minutes  Encounter Type: Individual    Any diabetes medication dose changes were made via the CDE Protocol per the patient's referring provider and primary care provider. A copy of this encounter was shared with the provider.    Much or all of the text in this note was generated through the use of the Dragon Dictate voice-to-text software.Errors in spelling or words which seem out of context are unintentional. Sound alike errors, in particular, may have escaped editing.

## 2022-10-31 ENCOUNTER — HOSPITAL ENCOUNTER (OUTPATIENT)
Dept: ULTRASOUND IMAGING | Facility: CLINIC | Age: 67
Discharge: HOME | End: 2022-10-31
Attending: NURSE PRACTITIONER | Admitting: NURSE PRACTITIONER
Payer: MEDICARE

## 2022-10-31 DIAGNOSIS — R79.89 ELEVATED LIVER FUNCTION TESTS: ICD-10-CM

## 2022-10-31 PROCEDURE — 76705 ECHO EXAM OF ABDOMEN: CPT

## 2022-11-01 ENCOUNTER — MEDICAL CORRESPONDENCE (OUTPATIENT)
Dept: HEALTH INFORMATION MANAGEMENT | Facility: CLINIC | Age: 67
End: 2022-11-01

## 2022-11-01 ENCOUNTER — TRANSFERRED RECORDS (OUTPATIENT)
Dept: MULTI SPECIALTY CLINIC | Facility: CLINIC | Age: 67
End: 2022-11-01

## 2022-11-01 LAB — RETINOPATHY: NORMAL

## 2022-11-21 ENCOUNTER — ALLIED HEALTH/NURSE VISIT (OUTPATIENT)
Dept: EDUCATION SERVICES | Facility: CLINIC | Age: 67
End: 2022-11-21
Payer: MEDICARE

## 2022-11-21 DIAGNOSIS — E11.9 DIABETES MELLITUS, TYPE 2 (H): Primary | ICD-10-CM

## 2022-11-21 PROCEDURE — 99207 PR NO CHARGE NURSE ONLY: CPT

## 2022-11-21 PROCEDURE — G0108 DIAB MANAGE TRN  PER INDIV: HCPCS

## 2022-11-21 NOTE — PATIENT INSTRUCTIONS
1. Eat 3 balanced meals each day - Monitor carb intake and limit to 45-60 grams per meal  This would be equal to 3-4 choices ~  1 choice = 15 grams    Try not to go any lower than 30 grams per meal    Do not wait longer than 4-5 hours to eat something  Snacks limit to no more than 30 grams of carbohydrates or 2 choices  Make sure you include protein source with each meal and at bedtime - this has been shown to help with blood glucose elevations    2. Check blood sugars once each day - keep alternating between am fasting and 2 hours AFTER dinner just like you have been    Fasting and before meal target is 80 - 130   2 hours after a meal target is < 180  remember to bring meter and log book to all appointments    3. Incorporate 30 minutes activity into each day - does not need to be all at one time & walking counts    4. Take diabetes medications as prescribed continue Metformin 500 mg daily for now    Start having a protein at bedtime 10-15 grams  let's see how that goes with helping your morning numbers come down a bit - we will give it 4- 6 weeks before making any changes     Happy Thanksgiving !!!

## 2022-11-21 NOTE — PROGRESS NOTES
Diabetes Self-Management Education & Support    Presents for: Initial Assessment for new diagnosis    Type of Service: In Person Visit    Assessment Type:   ASSESSMENT:  Dorita is a very pleasant 66-year-old who returns to clinic today for follow-up after receiving initial education for new diagnosis of DMT2 on 10/24/2022.  She arrived today unaccompanied and had her logbook with her.  She has been checking her blood glucose once daily on most days since her last meeting and has been alternating times between a.m. fasting and 2 hours after dinner as had been requested.  She is currently taking 500 mg of metformin XR daily and overall is tolerating it.  She did say on some days she feels it gives her an upset stomach, but this is not debilitating..  Blood glucose readings as well as intake was reviewed and discussed.  I did make some suggestions/recommendations to her today to try and get tighter glycemic control.   We discussed increasing dose of metformin to 1000 mg p.o. daily but she was reluctant to do so.  Instead would like to continue to work on lifestyle modifications and weight loss.  I told her I felt this was very reasonable and we will see how she does between now and her next visit on 1/2/2003.  Brit stated today she will be leaving for [place] for a few months-will be gone mid January 2 mid-March.    Patient's most recent   Lab Results   Component Value Date    A1C 7.1 09/08/2022     is not meeting goal of <7.0    Diabetes knowledge and skills assessment:       Continue education with the following diabetes management concepts: Healthy Eating, Being Active, Taking Medication, Problem Solving, Reducing Risks and Healthy Coping    Based on learning assessment above, most appropriate setting for further diabetes education would be: Individual setting.      PLAN  . Eat 3 balanced meals each day - Monitor carb intake and limit to 45-60 grams per meal  This would be equal to 3-4 choices ~  1 choice =  15 grams    Try not to go any lower than 30 grams per meal    Do not wait longer than 4-5 hours to eat something  Snacks limit to no more than 30 grams of carbohydrates or 2 choices  Make sure you include protein source with each meal and at bedtime - this has been shown to help with blood glucose elevations    2. Check blood sugars once each day - keep alternating between am fasting and 2 hours AFTER dinner just like you have been    Fasting and before meal target is 80 - 130   2 hours after a meal target is < 180  remember to bring meter and log book to all appointments    3. Incorporate 30 minutes activity into each day - does not need to be all at one time & walking counts    4. Take diabetes medications as prescribed continue Metformin 500 mg daily for now    Start having a protein at bedtime 10-15 grams  let's see how that goes with helping your morning numbers come down a bit - we will give it 4- 6 weeks before making any changes       Topics to cover at upcoming visits: Healthy Eating, Taking Medication, Reducing Risks and Healthy Coping    Follow-up: 1/2/2023    See Care Plan for co-developed, patient-state behavior change goals.  AVS provided for patient today.    Education Materials Provided:  No new materials provided today      SUBJECTIVE/OBJECTIVE:  Presents for: Initial Assessment for new diagnosis  Accompanied by: Self  Diabetes education in the past 24mo: No  Focus of Visit: Monitoring, Reducing Risks, Taking Medication, Healthy Coping, Healthy Eating, Assistance w/ making life changes, Self-care behavioral goal setting, Problem Solving  Diabetes type: Type 2  Date of diagnosis: 2022  Disease course: Stable  Other concerns:: None (TBI in 2016)  Cultural Influences/Ethnic Background:  Not  or       Diabetes Symptoms & Complications:  Fatigue: Yes (unrelated to DM)       Patient Problem List and Family Medical History reviewed for relevant medical history, current medical status, and  "diabetes risk factors.    Vitals:  There were no vitals taken for this visit.  Estimated body mass index is 28.78 kg/m  as calculated from the following:    Height as of 9/8/22: 1.778 m (5' 10\").    Weight as of 10/24/22: 91 kg (200 lb 9.6 oz).   Last 3 BP:   BP Readings from Last 3 Encounters:   09/08/22 128/78   06/03/22 130/88   07/21/21 127/83       History   Smoking Status     Some Days     Packs/day: 0.10     Years: 20.00     Types: Cigarettes   Smokeless Tobacco     Never       Labs:  Lab Results   Component Value Date    A1C 7.1 09/08/2022     Lab Results   Component Value Date     09/08/2022     06/03/2022     Lab Results   Component Value Date    LDL  06/03/2022      Comment:      Cannot estimate LDL when triglyceride exceeds 400 mg/dL     06/03/2022     Direct Measure HDL   Date Value Ref Range Status   06/03/2022 46 (L) >=50 mg/dL Final     Comment:     HDL Cholesterol Reference Range:     0-2 years:   No reference ranges established for patients under 2 years old  at [facility] for lipid analytes.    2-8 years:  Greater than 45 mg/dL     18 years and older:   Female: Greater than or equal to 50 mg/dL   Male:   Greater than or equal to 40 mg/dL   ]  GFR Estimate   Date Value Ref Range Status   09/08/2022 74 >60 mL/min/1.73m2 Final     Comment:     Effective December 21, 2021 eGFRcr in adults is calculated using the 2021 CKD-EPI creatinine equation which includes age and gender (Таьтяна manzano al., NEJM, DOI: 10.1056/KWWLiu9603714)   07/16/2020 >60 >60 mL/min/1.73m2 Final     GFR Estimate If Black   Date Value Ref Range Status   07/16/2020 >60 >60 mL/min/1.73m2 Final     Lab Results   Component Value Date    CR 0.86 09/08/2022     No results found for: MICROALBUMIN    Healthy Eating:  Healthy Eating Assessed Today: Yes  Cultural/Sikh diet restrictions?: No  Breakfast: 10:00 am : will have coffee with 1/2 and 1/2, 1 cup of Cheerios cereal with milk and a small banana-once " in a while will have woodard as well  Lunch: 12-1 pm : if she goes out to eat , it is often to [employer] -her and her sister are now splitting a sandwich and having fruit instead of French fries at home may have frozen or premade meals  Dinner: 5-6 pm: frozen meals, premade if she cooks is usually chix/pork/hamburger with frozen mixed vegetables- some type of fruit.  Last night had 6 mini quiches CHO <50 gms  Snacks: Between breakfast and lunch or late afternoon will have a string cheese or a couple of slices of cheese from a block or handful of nuts  Other: Lately has been waking up in the middle of the night and having 1/2-3/4 cup of milk-she can then go right back to sleep  Beverages: Water, Milk, Alcohol, Coffee (milk with Nestles Quik, soda is one small can gingerale, juice- cran or apple in am)  Has patient met with a dietitian in the past?: No    Being Active:  Being Active Assessed Today: Yes  Exercise:: Yes (reports she estimates she walks  afew miles each day)  Days per week of moderate to strenuous exercise (like a brisk walk): 5  On average, minutes per day of exercise at this level: 40  How intense was your typical exercise? : Light (like stretching or slow walking)  Exercise Minutes per Week: 200  Barrier to exercise: Safety (sometimes balance issues)    Monitoring:  Did patient bring glucose meter to appointment? : No (had logbook)  Blood Glucose Meter: Accu-chek  Times checking blood sugar at home (number): 1  Times checking blood sugar at home (per): Day    The following are the readings Dorita had in her logbook she had with her today.  FB, 163, 159, 158, 143, 143  2 hours post dinner: 180, 171    Initially instructed her to increase metformin to 1000 mg p.o. daily -she asked if she could please continue to work on lifestyle modifications for a while.  I felt this was very reasonable so I agreed.    Taking Medications:  Diabetes Medication(s)     Biguanides       metFORMIN (GLUCOPHAGE XR) 500  MG 24 hr tablet    Take 1 tablet (500 mg) by mouth daily (with dinner)          Taking Medication Assessed Today: Yes  Current Treatments: Diet  Problems taking diabetes medications regularly?: No  Diabetes medication side effects?:  (Stated today she feels like sometimes the metformin gives her an upset stomach, but this does not happen every day and it is not debilitating)    Problem Solving:  Problem Solving Assessed Today: Yes  Is the patient at risk for hypoglycemia?: No  Is the patient at risk for DKA?: No  Does patient have severe weather/disaster plan for diabetes management?: Not Needed  Does patient have sick day plan for diabetes management?: Not Needed              Reducing Risks:  Reducing Risks Assessed Today: Yes  Diabetes Risks: Age over 45 years, Sedentary Lifestyle, Family History  CAD Risks: Sedentary lifestyle, Post-menopausal, Hypertension, Diabetes Mellitus  Has dilated eye exam at least once a year?: Yes    Healthy Coping:  Healthy Coping Assessed Today: Yes  Emotional response to diabetes: Ready to learn  Informal Support system:: Family  Stage of change: ACTION (Actively working towards change)  Support resources: In-person Offerings  Patient Activation Measure Survey Score:  No flowsheet data found.      Care Plan and Education Provided:  Care Plan: Diabetes   Updates made by Sandra Peng RN since 11/21/2022 12:00 AM      Problem: HbA1C Not In Goal       Goal: Establish Regular Follow-Ups with PCP    This Visit's Progress: 100%   Recent Progress: 100%   Note:    Appointment scheduled with PCP on 12/12/2022 and [facility] on 1/2/2023     Problem: Diabetes Self-Management Education Needed to Optimize Self-Care Behaviors       Goal: Healthy Eating - follow a healthy eating pattern for diabetes    This Visit's Progress: 70%   Recent Progress: 80%   Note:    Current dietary intake was reviewed today-recommendations made-have her continue to work on making sure each meal is well-balanced and  instructed her to start having a protein at bedtime     Goal: Monitoring - monitor glucose and ketones as directed    This Visit's Progress: 40%   Recent Progress: 90%   Note:    Blood glucose data today was somewhat intermittent, encourage patient to make sure she is checking at least once daily     Goal: Taking Medication - patient is consistently taking medications as directed    This Visit's Progress: 50%   Recent Progress: 90%   Note:    Currently taking 500 mg of metformin XR, a.m. fasting blood sugars are all elevated above target         Thank you,  Sandra Hill RN [facility]  Certified Diabetes Care and   Visit type : DSMT        Time Spent: 60 minutes  Encounter Type: Individual    Any diabetes medication dose changes were made via the CDE Protocol per the patient's referring provider and primary care provider. A copy of this encounter was shared with the provider.    Much or all of the text in this note was generated through the use of the Dragon Dictate voice-to-text software.Errors in spelling or words which seem out of context are unintentional. Sound alike errors, in particular, may have escaped editing.

## 2022-11-21 NOTE — LETTER
11/21/2022         RE: Dorita Stringer  [address]  [place] [address]        Dear Colleague,    Thank you for referring your patient, Dorita Stringer, to the [facility]. Please see a copy of my visit note below.    Diabetes Self-Management Education & Support    Presents for: Initial Assessment for new diagnosis    Type of Service: In Person Visit    Assessment Type:   ASSESSMENT:  Dorita is a very pleasant 66-year-old who returns to clinic today for follow-up after receiving initial education for new diagnosis of DMT2 on 10/24/2022.  She arrived today unaccompanied and had her logbook with her.  She has been checking her blood glucose once daily on most days since her last meeting and has been alternating times between a.m. fasting and 2 hours after dinner as had been requested.  She is currently taking 500 mg of metformin XR daily and overall is tolerating it.  She did say on some days she feels it gives her an upset stomach, but this is not debilitating..  Blood glucose readings as well as intake was reviewed and discussed.  I did make some suggestions/recommendations to her today to try and get tighter glycemic control.   We discussed increasing dose of metformin to 1000 mg p.o. daily but she was reluctant to do so.  Instead would like to continue to work on lifestyle modifications and weight loss.  I told her I felt this was very reasonable and we will see how she does between now and her next visit on 1/2/2003.  Brit stated today she will be leaving for [place] for a few months-will be gone mid January 2 mid-March.    Patient's most recent   Lab Results   Component Value Date    A1C 7.1 09/08/2022     is not meeting goal of <7.0    Diabetes knowledge and skills assessment:       Continue education with the following diabetes management concepts: Healthy Eating, Being Active, Taking Medication, Problem Solving, Reducing Risks and Healthy  Coping    Based on learning assessment above, most appropriate setting for further diabetes education would be: Individual setting.      PLAN  . Eat 3 balanced meals each day - Monitor carb intake and limit to 45-60 grams per meal  This would be equal to 3-4 choices ~  1 choice = 15 grams    Try not to go any lower than 30 grams per meal    Do not wait longer than 4-5 hours to eat something  Snacks limit to no more than 30 grams of carbohydrates or 2 choices  Make sure you include protein source with each meal and at bedtime - this has been shown to help with blood glucose elevations    2. Check blood sugars once each day - keep alternating between am fasting and 2 hours AFTER dinner just like you have been    Fasting and before meal target is 80 - 130   2 hours after a meal target is < 180  remember to bring meter and log book to all appointments    3. Incorporate 30 minutes activity into each day - does not need to be all at one time & walking counts    4. Take diabetes medications as prescribed continue Metformin 500 mg daily for now    Start having a protein at bedtime 10-15 grams  let's see how that goes with helping your morning numbers come down a bit - we will give it 4- 6 weeks before making any changes       Topics to cover at upcoming visits: Healthy Eating, Taking Medication, Reducing Risks and Healthy Coping    Follow-up: 1/2/2023    See Care Plan for co-developed, patient-state behavior change goals.  AVS provided for patient today.    Education Materials Provided:  No new materials provided today      SUBJECTIVE/OBJECTIVE:  Presents for: Initial Assessment for new diagnosis  Accompanied by: Self  Diabetes education in the past 24mo: No  Focus of Visit: Monitoring, Reducing Risks, Taking Medication, Healthy Coping, Healthy Eating, Assistance w/ making life changes, Self-care behavioral goal setting, Problem Solving  Diabetes type: Type 2  Date of diagnosis: 2022  Disease course: Stable  Other  "concerns:: None (TBI in 2016)  Cultural Influences/Ethnic Background:  Not  or       Diabetes Symptoms & Complications:  Fatigue: Yes (unrelated to DM)       Patient Problem List and Family Medical History reviewed for relevant medical history, current medical status, and diabetes risk factors.    Vitals:  There were no vitals taken for this visit.  Estimated body mass index is 28.78 kg/m  as calculated from the following:    Height as of 9/8/22: 1.778 m (5' 10\").    Weight as of 10/24/22: 91 kg (200 lb 9.6 oz).   Last 3 BP:   BP Readings from Last 3 Encounters:   09/08/22 128/78   06/03/22 130/88   07/21/21 127/83       History   Smoking Status     Some Days     Packs/day: 0.10     Years: 20.00     Types: Cigarettes   Smokeless Tobacco     Never       Labs:  Lab Results   Component Value Date    A1C 7.1 09/08/2022     Lab Results   Component Value Date     09/08/2022     06/03/2022     Lab Results   Component Value Date    LDL  06/03/2022      Comment:      Cannot estimate LDL when triglyceride exceeds 400 mg/dL     06/03/2022     Direct Measure HDL   Date Value Ref Range Status   06/03/2022 46 (L) >=50 mg/dL Final     Comment:     HDL Cholesterol Reference Range:     0-2 years:   No reference ranges established for patients under 2 years old  at [facility] for lipid analytes.    2-8 years:  Greater than 45 mg/dL     18 years and older:   Female: Greater than or equal to 50 mg/dL   Male:   Greater than or equal to 40 mg/dL   ]  GFR Estimate   Date Value Ref Range Status   09/08/2022 74 >60 mL/min/1.73m2 Final     Comment:     Effective December 21, 2021 eGFRcr in adults is calculated using the 2021 CKD-EPI creatinine equation which includes age and gender (Татьяна manzano al., NEJM, DOI: 10.1056/QDKLrf7460478)   07/16/2020 >60 >60 mL/min/1.73m2 Final     GFR Estimate If Black   Date Value Ref Range Status   07/16/2020 >60 >60 mL/min/1.73m2 Final     Lab Results   Component " Value Date    CR 0.86 2022     No results found for: MICROALBUMIN    Healthy Eating:  Healthy Eating Assessed Today: Yes  Cultural/Latter-day diet restrictions?: No  Breakfast: 10:00 am : will have coffee with 1/2 and 1/2, 1 cup of Cheerios cereal with milk and a small banana-once in a while will have woodard as well  Lunch: 12-1 pm : if she goes out to eat , it is often to [employer] -her and her sister are now splitting a sandwich and having fruit instead of French fries at home may have frozen or premade meals  Dinner: 5-6 pm: frozen meals, premade if she cooks is usually chix/pork/hamburger with frozen mixed vegetables- some type of fruit.  Last night had 6 mini quiches CHO <50 gms  Snacks: Between breakfast and lunch or late afternoon will have a string cheese or a couple of slices of cheese from a block or handful of nuts  Other: Lately has been waking up in the middle of the night and having 1/2-3/4 cup of milk-she can then go right back to sleep  Beverages: Water, Milk, Alcohol, Coffee (milk with Nestles Quik, soda is one small can gingerale, juice- cran or apple in am)  Has patient met with a dietitian in the past?: No    Being Active:  Being Active Assessed Today: Yes  Exercise:: Yes (reports she estimates she walks  afew miles each day)  Days per week of moderate to strenuous exercise (like a brisk walk): 5  On average, minutes per day of exercise at this level: 40  How intense was your typical exercise? : Light (like stretching or slow walking)  Exercise Minutes per Week: 200  Barrier to exercise: Safety (sometimes balance issues)    Monitoring:  Did patient bring glucose meter to appointment? : No (had logbook)  Blood Glucose Meter: Accu-chek  Times checking blood sugar at home (number): 1  Times checking blood sugar at home (per): Day    The following are the readings Dorita had in her logbook she had with her today.  FB, 163, 159, 158, 143, 143  2 hours post dinner: 180, 171    Initially  instructed her to increase metformin to 1000 mg p.o. daily -she asked if she could please continue to work on lifestyle modifications for a while.  I felt this was very reasonable so I agreed.    Taking Medications:  Diabetes Medication(s)     Biguanides       metFORMIN (GLUCOPHAGE XR) 500 MG 24 hr tablet    Take 1 tablet (500 mg) by mouth daily (with dinner)          Taking Medication Assessed Today: Yes  Current Treatments: Diet  Problems taking diabetes medications regularly?: No  Diabetes medication side effects?:  (Stated today she feels like sometimes the metformin gives her an upset stomach, but this does not happen every day and it is not debilitating)    Problem Solving:  Problem Solving Assessed Today: Yes  Is the patient at risk for hypoglycemia?: No  Is the patient at risk for DKA?: No  Does patient have severe weather/disaster plan for diabetes management?: Not Needed  Does patient have sick day plan for diabetes management?: Not Needed              Reducing Risks:  Reducing Risks Assessed Today: Yes  Diabetes Risks: Age over 45 years, Sedentary Lifestyle, Family History  CAD Risks: Sedentary lifestyle, Post-menopausal, Hypertension, Diabetes Mellitus  Has dilated eye exam at least once a year?: Yes    Healthy Coping:  Healthy Coping Assessed Today: Yes  Emotional response to diabetes: Ready to learn  Informal Support system:: Family  Stage of change: ACTION (Actively working towards change)  Support resources: In-person Offerings  Patient Activation Measure Survey Score:  No flowsheet data found.      Care Plan and Education Provided:  Care Plan: Diabetes   Updates made by Sandra Peng RN since 11/21/2022 12:00 AM      Problem: HbA1C Not In Goal       Goal: Establish Regular Follow-Ups with PCP    This Visit's Progress: 100%   Recent Progress: 100%   Note:    Appointment scheduled with PCP on 12/12/2022 and [facility] on 1/2/2023     Problem: Diabetes Self-Management Education Needed to Optimize  Self-Care Behaviors       Goal: Healthy Eating - follow a healthy eating pattern for diabetes    This Visit's Progress: 70%   Recent Progress: 80%   Note:    Current dietary intake was reviewed today-recommendations made-have her continue to work on making sure each meal is well-balanced and instructed her to start having a protein at bedtime     Goal: Monitoring - monitor glucose and ketones as directed    This Visit's Progress: 40%   Recent Progress: 90%   Note:    Blood glucose data today was somewhat intermittent, encourage patient to make sure she is checking at least once daily     Goal: Taking Medication - patient is consistently taking medications as directed    This Visit's Progress: 50%   Recent Progress: 90%   Note:    Currently taking 500 mg of metformin XR, a.m. fasting blood sugars are all elevated above target         Thank you,  Sandra Hill RN [facility]  Certified Diabetes Care and   Visit type : DSMT        Time Spent: 60 minutes  Encounter Type: Individual    Any diabetes medication dose changes were made via the CDE Protocol per the patient's referring provider and primary care provider. A copy of this encounter was shared with the provider.    Much or all of the text in this note was generated through the use of the Dragon Dictate voice-to-text software.Errors in spelling or words which seem out of context are unintentional. Sound alike errors, in particular, may have escaped editing.

## 2022-12-02 DIAGNOSIS — E11.65 TYPE 2 DIABETES MELLITUS WITH HYPERGLYCEMIA, WITHOUT LONG-TERM CURRENT USE OF INSULIN (H): ICD-10-CM

## 2022-12-04 RX ORDER — METFORMIN HCL 500 MG
TABLET, EXTENDED RELEASE 24 HR ORAL
Qty: 90 TABLET | Refills: 2 | Status: SHIPPED | OUTPATIENT
Start: 2022-12-04 | End: 2022-12-12

## 2022-12-04 NOTE — TELEPHONE ENCOUNTER
"Last Written Prescription Date:  9/8/22  Last Fill Quantity: 90,  # refills: 0   Last office visit provider:  9/8/22     Requested Prescriptions   Pending Prescriptions Disp Refills     metFORMIN (GLUCOPHAGE XR) 500 MG 24 hr tablet [Pharmacy Med Name: METFORMIN ER 500MG 24HR TABS] 90 tablet 0     Sig: TAKE 1 TABLET(500 MG) BY MOUTH DAILY WITH DINNER       Biguanide Agents Passed - 12/2/2022  6:10 AM        Passed - Patient is age 10 or older        Passed - Patient has documented A1c within the specified period of time.     If HgbA1C is 8 or greater, it needs to be on file within the past 3 months.  If less than 8, must be on file within the past 6 months.     Recent Labs   Lab Test 09/08/22  1343   A1C 7.1*             Passed - Patient's CR is NOT>1.4 OR Patient's EGFR is NOT<45 within past 12 mos.     Recent Labs   Lab Test 09/08/22  1343 07/21/21  1200 07/16/20  1207   GFRESTIMATED 74   < > >60   GFRESTBLACK  --   --  >60    < > = values in this interval not displayed.       Recent Labs   Lab Test 09/08/22  1343   CR 0.86             Passed - Patient does NOT have a diagnosis of CHF.        Passed - Medication is active on med list        Passed - Patient is not pregnant        Passed - Patient has not had a positive pregnancy test within the past 12 mos.         Passed - Recent (6 mo) or future (30 days) visit within the authorizing provider's specialty     Patient had office visit in the last 6 months or has a visit in the next 30 days with authorizing provider or within the authorizing provider's specialty.  See \"Patient Info\" tab in inbasket, or \"Choose Columns\" in Meds & Orders section of the refill encounter.                 Rose Benoit 12/04/22 10:31 AM  "

## 2022-12-12 ENCOUNTER — OFFICE VISIT (OUTPATIENT)
Dept: INTERNAL MEDICINE | Facility: CLINIC | Age: 67
End: 2022-12-12
Payer: MEDICARE

## 2022-12-12 VITALS
OXYGEN SATURATION: 96 % | TEMPERATURE: 97.6 F | BODY MASS INDEX: 28.06 KG/M2 | RESPIRATION RATE: 16 BRPM | WEIGHT: 196 LBS | HEIGHT: 70 IN | HEART RATE: 84 BPM | DIASTOLIC BLOOD PRESSURE: 88 MMHG | SYSTOLIC BLOOD PRESSURE: 120 MMHG

## 2022-12-12 DIAGNOSIS — E66.3 OVERWEIGHT (BMI 25.0-29.9): ICD-10-CM

## 2022-12-12 DIAGNOSIS — E11.65 TYPE 2 DIABETES MELLITUS WITH HYPERGLYCEMIA, WITHOUT LONG-TERM CURRENT USE OF INSULIN (H): Primary | ICD-10-CM

## 2022-12-12 DIAGNOSIS — R79.89 ELEVATED LIVER FUNCTION TESTS: ICD-10-CM

## 2022-12-12 DIAGNOSIS — I10 ESSENTIAL HYPERTENSION: ICD-10-CM

## 2022-12-12 LAB
ALBUMIN SERPL BCG-MCNC: 4.1 G/DL (ref 3.5–5.2)
ALP SERPL-CCNC: 144 U/L (ref 35–104)
ALT SERPL W P-5'-P-CCNC: 33 U/L (ref 10–35)
ANION GAP SERPL CALCULATED.3IONS-SCNC: 10 MMOL/L (ref 7–15)
AST SERPL W P-5'-P-CCNC: 27 U/L (ref 10–35)
BILIRUB SERPL-MCNC: 0.4 MG/DL
BUN SERPL-MCNC: 14.2 MG/DL (ref 8–23)
CALCIUM SERPL-MCNC: 9.6 MG/DL (ref 8.8–10.2)
CHLORIDE SERPL-SCNC: 103 MMOL/L (ref 98–107)
CREAT SERPL-MCNC: 0.86 MG/DL (ref 0.51–0.95)
DEPRECATED HCO3 PLAS-SCNC: 26 MMOL/L (ref 22–29)
GFR SERPL CREATININE-BSD FRML MDRD: 74 ML/MIN/1.73M2
GLUCOSE SERPL-MCNC: 156 MG/DL (ref 70–99)
HBA1C MFR BLD: 7.7 % (ref 0–5.6)
POTASSIUM SERPL-SCNC: 4.5 MMOL/L (ref 3.4–5.3)
PROT SERPL-MCNC: 6.9 G/DL (ref 6.4–8.3)
SODIUM SERPL-SCNC: 139 MMOL/L (ref 136–145)

## 2022-12-12 PROCEDURE — 80053 COMPREHEN METABOLIC PANEL: CPT | Performed by: NURSE PRACTITIONER

## 2022-12-12 PROCEDURE — 83036 HEMOGLOBIN GLYCOSYLATED A1C: CPT | Performed by: NURSE PRACTITIONER

## 2022-12-12 PROCEDURE — 36415 COLL VENOUS BLD VENIPUNCTURE: CPT | Performed by: NURSE PRACTITIONER

## 2022-12-12 PROCEDURE — 99214 OFFICE O/P EST MOD 30 MIN: CPT | Performed by: NURSE PRACTITIONER

## 2022-12-12 ASSESSMENT — PATIENT HEALTH QUESTIONNAIRE - PHQ9
SUM OF ALL RESPONSES TO PHQ QUESTIONS 1-9: 1
SUM OF ALL RESPONSES TO PHQ QUESTIONS 1-9: 1
10. IF YOU CHECKED OFF ANY PROBLEMS, HOW DIFFICULT HAVE THESE PROBLEMS MADE IT FOR YOU TO DO YOUR WORK, TAKE CARE OF THINGS AT HOME, OR GET ALONG WITH OTHER PEOPLE: NOT DIFFICULT AT ALL

## 2022-12-12 NOTE — PATIENT INSTRUCTIONS
Stop the metformin due to the side effects.  I have added this to your allergy list.    Start the Jardiance daily.    Continue to see the dietitian in January before you leave.    Continue checking your blood sugars.    It is important to eat a good protein source with every meal, then work on vegetables and fruits.    Goals for exercise 150 minutes/week, getting her heart rate up, breaking a sweat.    I will plan on seeing you back in 3 months, when you are back from your vacation.  Please let me know before you leave if you are having side effects from the Jardiance.

## 2022-12-12 NOTE — PROGRESS NOTES
"  Assessment & Plan     Type 2 diabetes mellitus with hyperglycemia, without long-term current use of insulin (H): Stop metformin as she cannot tolerate this. She is not a candidate for a GLP-1 with with family history of thyroid cancer. Will start on Jardiance. Continue working on diet and exercise. Follow up in three months. Will check her A1C today.   - HEMOGLOBIN A1C  - empagliflozin (JARDIANCE) 25 MG TABS tablet  Dispense: 90 tablet; Refill: 3  - Comprehensive metabolic panel (BMP + Alb, Alk Phos, ALT, AST, Total. Bili, TP)  - Stop Metformin  - Continue diet and exercise.     Elevated liver function tests: Hx of this in the past. Will recheck her liver enzymes today. She does have known fatty liver.   - Comprehensive metabolic panel (BMP + Alb, Alk Phos, ALT, AST, Total. Bili, TP)    Essential hypertension: Blood pressure today in clinic was 120/88. She continues on Losartan. Stable.     Overweight (BMI 25.0-29.9): BMI today was 28.12. Discussed diet and exercise.     Nicotine/Tobacco Cessation:  She reports that she has been smoking cigarettes. She has a 2.00 pack-year smoking history. She has never used smokeless tobacco.  Nicotine/Tobacco Cessation Plan:   Information offered: Patient not interested at this time      BMI:   Estimated body mass index is 28.12 kg/m  as calculated from the following:    Height as of this encounter: 1.778 m (5' 10\").    Weight as of this encounter: 88.9 kg (196 lb).   Weight management plan: Discussed healthy diet and exercise guidelines    Return in about 3 months (around 3/12/2023) for Follow up.    Stacey Aquino CNP  [facility]    Jonnathan Kevin is a 66 year old presenting for the following health issues:  Follow Up (Diabetes- pt is not fasting)      The patient presents today for follow up of her diabetes. She reports that since she started taking Metformin she has had several migraine headaches and stomach upset. She is not " interested in increasing her Metformin dosing. Discussed that we should discontinue this medication and start another, that she can tolerate better.    She does have a family history of thyroid cancer, so a GLP-1 agonist is ruled out. Will start her on Jardiance.    She is working with a dietician and her diet. She has a different schedule than most, she wakes up between 10-11am, and then will eat breakfast. Lunch is around 2-3pm, and supper time is around 7-8pm. She goes to bed around 10pm.     She has been trying to eat some protein prior to bed time, like cottage cheese. She feels like that all she is currently doing is eating. Discussed the most important thing is that she eats protein with every meal, then try to work in lower carb veggies, and fruits. Push water intake, and start exercising.    She reports that she will be leaving to stay with her friend of 55 years in [place] in January-March.     She is due for follow up labs today.     History of Present Illness       Diabetes:   She presents for follow up of diabetes.  She is checking home blood glucose one time daily. She checks blood glucose before meals.  Blood glucose is never over 200 and never under 70. When her blood glucose is low, the patient is asymptomatic for confusion, blurred vision, lethargy and reports not feeling dizzy, shaky, or weak.  She has no concerns regarding her diabetes at this time.  She is having burning in feet. The patient has had a diabetic eye exam in the last 12 months. Eye exam performed on november 2022. Location of last eye exam [facility].        She eats 2-3 servings of fruits and vegetables daily.She consumes 1 sweetened beverage(s) daily.She exercises with enough effort to increase her heart rate 9 or less minutes per day.  She exercises with enough effort to increase her heart rate 3 or less days per week. She is missing 1 dose(s) of medications per week.    Today's PHQ-9         PHQ-9 Total Score: 1    PHQ-9 Q9  "Thoughts of better off dead/self-harm past 2 weeks :   Not at all    How difficult have these problems made it for you to do your work, take care of things at home, or get along with other people: Not difficult at all     Review of Systems   Constitutional, HEENT, cardiovascular, pulmonary, GI, , musculoskeletal, neuro, skin, endocrine and psych systems are negative, except as otherwise noted.      Objective    /88 (BP Location: Right arm, Patient Position: Sitting)   Pulse 84   Temp 97.6  F (36.4  C)   Resp 16   Ht 1.778 m (5' 10\")   Wt 88.9 kg (196 lb)   SpO2 96%   BMI 28.12 kg/m    Body mass index is 28.12 kg/m .  Physical Exam   GENERAL: healthy, alert and no distress  RESP: lungs clear to auscultation - no rales, rhonchi or wheezes  CV: regular rate and rhythm, normal S1 S2, no S3 or S4, no murmur, click or rub, no peripheral edema and peripheral pulses strong  MS: no gross musculoskeletal defects noted, no edema  SKIN: no suspicious lesions or rashes  NEURO: Normal strength and tone, mentation intact and speech normal  PSYCH: mentation appears normal, affect normal/bright        "

## 2023-01-02 ENCOUNTER — ALLIED HEALTH/NURSE VISIT (OUTPATIENT)
Dept: EDUCATION SERVICES | Facility: CLINIC | Age: 68
End: 2023-01-02
Payer: MEDICARE

## 2023-01-02 VITALS — WEIGHT: 200 LBS | BODY MASS INDEX: 28.7 KG/M2

## 2023-01-02 DIAGNOSIS — E11.9 DIABETES MELLITUS, TYPE 2 (H): Primary | ICD-10-CM

## 2023-01-02 PROCEDURE — G0108 DIAB MANAGE TRN  PER INDIV: HCPCS

## 2023-01-02 PROCEDURE — 99207 PR NO CHARGE NURSE ONLY: CPT

## 2023-01-02 NOTE — LETTER
1/2/2023         RE: Dorita Stringer  [address]  [place] [address]        Dear Colleague,    Thank you for referring your patient, Dorita Stringer, to the [facility]. Please see a copy of my visit note below.    Diabetes Self-Management Education & Support    Presents for: Individual review    Type of Service: In Person Visit    Assessment Type:   ASSESSMENT:  EM is a pleasant 67-year-old woman who comes to clinic today to review her blood glucose readings and to assess/assist her current diabetes self-management skills.  She arrived today unaccompanied and had her logbook with her.  She continues to check her blood glucose once daily as it been requested has been alternating times between a.m. fasting and 2 hours post dinner.  Medications were reviewed as well and it was noted at her last visit with her primary care provider metformin had been discontinued and Jardiance initiated.  Dorita informed me today when she went to go  the Jardiance from her pharmacy the cost was $2049.89.  This was far too costly for her so she did not .  She did try resuming metformin and experienced the same adverse reactions she previously had.  I did look into her formulary online and saw that the majority of diabetes medications available were coming up on a tier 3 level.  Agreed with her that the Jardiance was too cost prohibitive and decided to let her continue to work on improving her diet and activity level for the next couple of months.  She was very agreeable to this plan.  When I do meet with her again in April, and if her A1c has not improved we may want to consider a small dose of glipizide XL or maybe Pioglitazone.  Informed her I would also check with endocrinology for their opinion on the contraindication of GLP-1 agonist.     Patient's most recent   Lab Results   Component Value Date    A1C 7.7 12/12/2022     is not meeting goal of 7.5-8.0%    Diabetes  knowledge and skills assessment:   Patient is knowledgeable in diabetes management concepts related to: Being Active    Continue education with the following diabetes management concepts: Healthy Eating, Taking Medication, Problem Solving, Reducing Risks and Healthy Coping    Based on learning assessment above, most appropriate setting for further diabetes education would be: Individual setting.      PLAN  . Eat 3 balanced meals each day - Monitor carb intake and limit to 45-60 grams per meal  This would be equal to 3-4 choices ~  1 choice = 15 grams    Do not wait longer than 4-5 hours to eat something  Snacks limit to no more than 30 grams of carbohydrates or 2 choices  Make sure you include protein source with each meal and at bedtime - this has been shown to help with blood glucose elevations    2. Check blood sugars once each day -continue to alternate times between a.m. fasting and 2 hours after dinner   Fasting and before meal target is 80 - 130   2 hours after a meal target is < 180  remember to bring meter and log book to all appointments    3. Incorporate 30 minutes activity into each day - does not need to be all at one time & walking counts    4.  Continue to work on diet and exercise for glucose management-call with any questions or concerns.       Topics to cover at upcoming visits: Healthy Eating, Monitoring, Taking Medication, Reducing Risks and Healthy Coping    Follow-up: In April after she returns from [place]    See Care Plan for co-developed, patient-state behavior change goals.  AVS provided for patient today.    Education Materials Provided:  No new materials provided today      SUBJECTIVE/OBJECTIVE:  Presents for: Individual review  Accompanied by: Self  Diabetes education in the past 24 mo: Yes  Focus of Visit: Monitoring, Reducing Risks, Taking Medication, Healthy Coping, Healthy Eating, Assistance w/ making life changes, Self-care behavioral goal setting, Being Active  Diabetes  "type: Type 2  Date of diagnosis: 2022  Disease course: Stable  Other concerns:: None (TBI in 2016)  Cultural Influences/Ethnic Background:  Not  or       Diabetes Symptoms & Complications:  Fatigue: Yes (unrelated to DM)  Weight trend: Stable       Patient Problem List and Family Medical History reviewed for relevant medical history, current medical status, and diabetes risk factors.    Vitals:  Wt 90.7 kg (200 lb)   BMI 28.70 kg/m    Estimated body mass index is 28.7 kg/m  as calculated from the following:    Height as of 12/12/22: 1.778 m (5' 10\").    Weight as of this encounter: 90.7 kg (200 lb).   Last 3 BP:   BP Readings from Last 3 Encounters:   12/12/22 120/88   09/08/22 128/78   06/03/22 130/88       History   Smoking Status     Some Days     Packs/day: 0.10     Years: 20.00     Types: Cigarettes   Smokeless Tobacco     Never       Labs:  Lab Results   Component Value Date    A1C 7.7 12/12/2022     Lab Results   Component Value Date     12/12/2022     06/03/2022     Lab Results   Component Value Date    LDL  06/03/2022      Comment:      Cannot estimate LDL when triglyceride exceeds 400 mg/dL     06/03/2022     Direct Measure HDL   Date Value Ref Range Status   06/03/2022 46 (L) >=50 mg/dL Final     Comment:     HDL Cholesterol Reference Range:     0-2 years:   No reference ranges established for patients under 2 years old  at [facility] for lipid analytes.    2-8 years:  Greater than 45 mg/dL     18 years and older:   Female: Greater than or equal to 50 mg/dL   Male:   Greater than or equal to 40 mg/dL   ]  GFR Estimate   Date Value Ref Range Status   12/12/2022 74 >60 mL/min/1.73m2 Final     Comment:     Effective December 21, 2021 eGFRcr in adults is calculated using the 2021 CKD-EPI creatinine equation which includes age and gender (Татьяна manzano al., NEJM, DOI: 10.1056/DJMBlr4735719)   07/16/2020 >60 >60 mL/min/1.73m2 Final     GFR Estimate If Black   Date " Value Ref Range Status   07/16/2020 >60 >60 mL/min/1.73m2 Final     Lab Results   Component Value Date    CR 0.86 12/12/2022     No results found for: MICROALBUMIN    Healthy Eating:  Healthy Eating Assessed Today: Yes  Cultural/Latter-day diet restrictions?: No  Breakfast: 10:00 am : will have coffee with 1/2 and 1/2, typically she is not hungry until around noon  Lunch: 12-1 pm : if she goes out to eat , it is often to [employer] -her and her sister are now splitting a sandwich and having fruit or a cup of soup instead of French fries at home may have frozen or premade meals  Dinner: 6 PM and 7 PM; dinner is usually very late she might have some fruit which is crackers and cheese  Snacks: Between breakfast and lunch or late afternoon will have a string cheese or a couple of slices of cheese from a block or handful of nuts-  Other: Lately has been waking up in the middle of the night and having 3 to 4 ounces of juice or 7-Up-she can then go right back to sleep-on nights she does this her morning blood glucose readings have been in target  Beverages: Water, Milk, Alcohol, Coffee, Juice, Soda (milk with Nestles Quik, soda is one small can gingerale, juice- cran or apple in am)  Has patient met with a dietitian in the past?: No    Being Active:  Being Active Assessed Today: Yes  Exercise:: Yes (reports she estimates she walks  afew miles each day)  Days per week of moderate to strenuous exercise (like a brisk walk): 5  On average, minutes per day of exercise at this level: 40  How intense was your typical exercise? : Light (like stretching or slow walking)  Exercise Minutes per Week: 200  Barrier to exercise: Safety (sometimes balance issues)    Monitoring:  Did patient bring glucose meter to appointment? : No (had logbook)  Blood Glucose Meter: Accu-chek  Times checking blood sugar at home (number): 1  Times checking blood sugar at home (per): Day    The following are Helens most recent blood glucose readings.  FBG:  140, 141, 153, 143, 137, 152, 123, 140, 155, 144, 168  2-hour post dinner: 177, 175, 185    These readings were reviewed and discussed with her during our visit today.  Informed her overall her numbers were pretty consistent and acceptable.    Taking Medications:      Taking Medication Assessed Today: Yes  Current Treatments: Diet  Problems taking diabetes medications regularly?: No  Diabetes medication side effects?:  (Stated today she feels like sometimes the metformin gives her an upset stomach, but this does not happen every day and it is not debilitating)    Problem Solving:  Problem Solving Assessed Today: Yes  Is the patient at risk for hypoglycemia?: No  Is the patient at risk for DKA?: No  Does patient have severe weather/disaster plan for diabetes management?: Not Needed  Does patient have sick day plan for diabetes management?: Not Needed              Reducing Risks:  Reducing Risks Assessed Today: Yes  Diabetes Risks: Age over 45 years, Sedentary Lifestyle, Family History  CAD Risks: Sedentary lifestyle, Post-menopausal, Hypertension, Diabetes Mellitus  Has dilated eye exam at least once a year?: Yes    Healthy Coping:  Healthy Coping Assessed Today: Yes  Emotional response to diabetes: Ready to learn  Informal Support system:: Family  Stage of change: ACTION (Actively working towards change)  Support resources: In-person Offerings  Patient Activation Measure Survey Score:  No flowsheet data found.      Care Plan and Education Provided:  Care Plan: Diabetes   Updates made by Sandra Peng RN since 1/2/2023 12:00 AM      Problem: HbA1C Not In Goal       Goal: Establish Regular Follow-Ups with PCP    Recent Progress: 100%   Note:    Follow-up appointments with both PCP and [facility] are scheduled for early 2023     Problem: Diabetes Self-Management Education Needed to Optimize Self-Care Behaviors       Goal: Healthy Eating - follow a healthy eating pattern for diabetes    This Visit's Progress: On track    Recent Progress: 70%   Note:    Current dietary intake was reviewed today-recommendations made-have her continue to work on making sure each meal is well-balanced      Goal: Being Active - get regular physical activity, working up to at least 150 minutes per week    This Visit's Progress: On track   Recent Progress: 60%   Note:    She is walking daily     Task: Develop physical activity plan with patient Completed 1/2/2023   Responsible User: Sandra Peng RN      Goal: Monitoring - monitor glucose and ketones as directed    This Visit's Progress: 80%   Recent Progress: 40%   Note:    Frequency of monitoring has improved, I did however request she work on getting more postmeal numbers for review     Goal: Taking Medication - patient is consistently taking medications as directed    This Visit's Progress: 30%   Recent Progress: 50%   Note:    Continue to work on finding a medication for Dorita that is both affordable and applicable     Goal: Healthy Coping - use available resources to cope with the challenges of managing diabetes    This Visit's Progress: 30%   Recent Progress: 60%      Task: Discuss methods for coping with stress Completed 1/2/2023   Responsible User: Sandra Peng RN          Thank you,  Sandra Peng RN [facility]  Certified Diabetes Care and   Visit type : DSMT        Time Spent: 60 minutes  Encounter Type: Individual    Any diabetes medication dose changes were made via the CDE Protocol per the patient's referring provider and primary care provider. A copy of this encounter was shared with the provider.    Much or all of the text in this note was generated through the use of the Dragon Dictate voice-to-text software.Errors in spelling or words which seem out of context are unintentional. Sound alike errors, in particular, may have escaped editing.

## 2023-01-02 NOTE — PROGRESS NOTES
Diabetes Self-Management Education & Support    Presents for: Individual review    Type of Service: In Person Visit    Assessment Type:   ASSESSMENT:  Dorita is a pleasant 67-year-old woman who comes to clinic today to review her blood glucose readings and to assess/assist her current diabetes self-management skills.  She arrived today unaccompanied and had her logbook with her.  She continues to check her blood glucose once daily as it been requested has been alternating times between a.m. fasting and 2 hours post dinner.  Medications were reviewed as well and it was noted at her last visit with her primary care provider metformin had been discontinued and Jardiance initiated.  Dorita informed me today when she went to go  the Jardiance from her pharmacy the cost was $2049.89.  This was far too costly for her so she did not .  She did try resuming metformin and experienced the same adverse reactions she previously had.  I did look into her formulary online and saw that the majority of diabetes medications available were coming up on a tier 3 level.  Agreed with her that the Jardiance was too cost prohibitive and decided to let her continue to work on improving her diet and activity level for the next couple of months.  She was very agreeable to this plan.  When I do meet with her again in April, and if her A1c has not improved we may want to consider a small dose of glipizide XL or maybe Pioglitazone.  Informed her I would also check with endocrinology for their opinion on the contraindication of GLP-1 agonist.     Patient's most recent   Lab Results   Component Value Date    A1C 7.7 12/12/2022     is not meeting goal of 7.5-8.0%    Diabetes knowledge and skills assessment:   Patient is knowledgeable in diabetes management concepts related to: Being Active    Continue education with the following diabetes management concepts: Healthy Eating, Taking Medication, Problem Solving, Reducing Risks and Healthy  Coping    Based on learning assessment above, most appropriate setting for further diabetes education would be: Individual setting.      PLAN  . Eat 3 balanced meals each day - Monitor carb intake and limit to 45-60 grams per meal  This would be equal to 3-4 choices ~  1 choice = 15 grams    Do not wait longer than 4-5 hours to eat something  Snacks limit to no more than 30 grams of carbohydrates or 2 choices  Make sure you include protein source with each meal and at bedtime - this has been shown to help with blood glucose elevations    2. Check blood sugars once each day -continue to alternate times between a.m. fasting and 2 hours after dinner   Fasting and before meal target is 80 - 130   2 hours after a meal target is < 180  remember to bring meter and log book to all appointments    3. Incorporate 30 minutes activity into each day - does not need to be all at one time & walking counts    4.  Continue to work on diet and exercise for glucose management-call with any questions or concerns.       Topics to cover at upcoming visits: Healthy Eating, Monitoring, Taking Medication, Reducing Risks and Healthy Coping    Follow-up: In April after she returns from [place]    See Care Plan for co-developed, patient-state behavior change goals.  AVS provided for patient today.    Education Materials Provided:  No new materials provided today      SUBJECTIVE/OBJECTIVE:  Presents for: Individual review  Accompanied by: Self  Diabetes education in the past 24 mo: Yes  Focus of Visit: Monitoring, Reducing Risks, Taking Medication, Healthy Coping, Healthy Eating, Assistance w/ making life changes, Self-care behavioral goal setting, Being Active  Diabetes type: Type 2  Date of diagnosis: 2022  Disease course: Stable  Other concerns:: None (TBI in 2016)  Cultural Influences/Ethnic Background:  Not  or       Diabetes Symptoms & Complications:  Fatigue: Yes (unrelated to DM)  Weight trend: Stable       Patient  "Problem List and Family Medical History reviewed for relevant medical history, current medical status, and diabetes risk factors.    Vitals:  Wt 90.7 kg (200 lb)   BMI 28.70 kg/m    Estimated body mass index is 28.7 kg/m  as calculated from the following:    Height as of 12/12/22: 1.778 m (5' 10\").    Weight as of this encounter: 90.7 kg (200 lb).   Last 3 BP:   BP Readings from Last 3 Encounters:   12/12/22 120/88   09/08/22 128/78   06/03/22 130/88       History   Smoking Status     Some Days     Packs/day: 0.10     Years: 20.00     Types: Cigarettes   Smokeless Tobacco     Never       Labs:  Lab Results   Component Value Date    A1C 7.7 12/12/2022     Lab Results   Component Value Date     12/12/2022     06/03/2022     Lab Results   Component Value Date    LDL  06/03/2022      Comment:      Cannot estimate LDL when triglyceride exceeds 400 mg/dL     06/03/2022     Direct Measure HDL   Date Value Ref Range Status   06/03/2022 46 (L) >=50 mg/dL Final     Comment:     HDL Cholesterol Reference Range:     0-2 years:   No reference ranges established for patients under 2 years old  at [facility] for lipid analytes.    2-8 years:  Greater than 45 mg/dL     18 years and older:   Female: Greater than or equal to 50 mg/dL   Male:   Greater than or equal to 40 mg/dL   ]  GFR Estimate   Date Value Ref Range Status   12/12/2022 74 >60 mL/min/1.73m2 Final     Comment:     Effective December 21, 2021 eGFRcr in adults is calculated using the 2021 CKD-EPI creatinine equation which includes age and gender (Татьяна manzano al., NEJM, DOI: 10.1056/TWYJdl9458175)   07/16/2020 >60 >60 mL/min/1.73m2 Final     GFR Estimate If Black   Date Value Ref Range Status   07/16/2020 >60 >60 mL/min/1.73m2 Final     Lab Results   Component Value Date    CR 0.86 12/12/2022     No results found for: MICROALBUMIN    Healthy Eating:  Healthy Eating Assessed Today: Yes  Cultural/Denominational diet restrictions?: No  Breakfast: " 10:00 am : will have coffee with 1/2 and 1/2, typically she is not hungry until around noon  Lunch: 12-1 pm : if she goes out to eat , it is often to [employer] -her and her sister are now splitting a sandwich and having fruit or a cup of soup instead of French fries at home may have frozen or premade meals  Dinner: 6 PM and 7 PM; dinner is usually very late she might have some fruit which is crackers and cheese  Snacks: Between breakfast and lunch or late afternoon will have a string cheese or a couple of slices of cheese from a block or handful of nuts-  Other: Lately has been waking up in the middle of the night and having 3 to 4 ounces of juice or 7-Up-she can then go right back to sleep-on nights she does this her morning blood glucose readings have been in target  Beverages: Water, Milk, Alcohol, Coffee, Juice, Soda (milk with Nestles Quik, soda is one small can gingerale, juice- cran or apple in am)  Has patient met with a dietitian in the past?: No    Being Active:  Being Active Assessed Today: Yes  Exercise:: Yes (reports she estimates she walks  afew miles each day)  Days per week of moderate to strenuous exercise (like a brisk walk): 5  On average, minutes per day of exercise at this level: 40  How intense was your typical exercise? : Light (like stretching or slow walking)  Exercise Minutes per Week: 200  Barrier to exercise: Safety (sometimes balance issues)    Monitoring:  Did patient bring glucose meter to appointment? : No (had logbook)  Blood Glucose Meter: Accu-chek  Times checking blood sugar at home (number): 1  Times checking blood sugar at home (per): Day    The following are Helens most recent blood glucose readings.  FB, 141, 153, 143, 137, 152, 123, 140, 155, 144, 168  2-hour post dinner: 177, 175, 185    These readings were reviewed and discussed with her during our visit today.  Informed her overall her numbers were pretty consistent and acceptable.    Taking Medications:      Taking  Medication Assessed Today: Yes  Current Treatments: Diet  Problems taking diabetes medications regularly?: No  Diabetes medication side effects?:  (Stated today she feels like sometimes the metformin gives her an upset stomach, but this does not happen every day and it is not debilitating)    Problem Solving:  Problem Solving Assessed Today: Yes  Is the patient at risk for hypoglycemia?: No  Is the patient at risk for DKA?: No  Does patient have severe weather/disaster plan for diabetes management?: Not Needed  Does patient have sick day plan for diabetes management?: Not Needed              Reducing Risks:  Reducing Risks Assessed Today: Yes  Diabetes Risks: Age over 45 years, Sedentary Lifestyle, Family History  CAD Risks: Sedentary lifestyle, Post-menopausal, Hypertension, Diabetes Mellitus  Has dilated eye exam at least once a year?: Yes    Healthy Coping:  Healthy Coping Assessed Today: Yes  Emotional response to diabetes: Ready to learn  Informal Support system:: Family  Stage of change: ACTION (Actively working towards change)  Support resources: In-person Offerings  Patient Activation Measure Survey Score:  No flowsheet data found.      Care Plan and Education Provided:  Care Plan: Diabetes   Updates made by Sandra Peng RN since 1/2/2023 12:00 AM      Problem: HbA1C Not In Goal       Goal: Establish Regular Follow-Ups with PCP    Recent Progress: 100%   Note:    Follow-up appointments with both PCP and [facility] are scheduled for early 2023     Problem: Diabetes Self-Management Education Needed to Optimize Self-Care Behaviors       Goal: Healthy Eating - follow a healthy eating pattern for diabetes    This Visit's Progress: On track   Recent Progress: 70%   Note:    Current dietary intake was reviewed today-recommendations made-have her continue to work on making sure each meal is well-balanced      Goal: Being Active - get regular physical activity, working up to at least 150 minutes per week    This  Visit's Progress: On track   Recent Progress: 60%   Note:    She is walking daily     Task: Develop physical activity plan with patient Completed 1/2/2023   Responsible User: Sandra Peng RN      Goal: Monitoring - monitor glucose and ketones as directed    This Visit's Progress: 80%   Recent Progress: 40%   Note:    Frequency of monitoring has improved, I did however request she work on getting more postmeal numbers for review     Goal: Taking Medication - patient is consistently taking medications as directed    This Visit's Progress: 30%   Recent Progress: 50%   Note:    Continue to work on finding a medication for Dorita that is both affordable and applicable     Goal: Healthy Coping - use available resources to cope with the challenges of managing diabetes    This Visit's Progress: 30%   Recent Progress: 60%      Task: Discuss methods for coping with stress Completed 1/2/2023   Responsible User: Sandra Peng RN          Thank you,  Sandra Peng RN [facility]  Certified Diabetes Care and   Visit type : DSMT        Time Spent: 60 minutes  Encounter Type: Individual    Any diabetes medication dose changes were made via the CDE Protocol per the patient's referring provider and primary care provider. A copy of this encounter was shared with the provider.    Much or all of the text in this note was generated through the use of the Dragon Dictate voice-to-text software.Errors in spelling or words which seem out of context are unintentional. Sound alike errors, in particular, may have escaped editing.

## 2023-01-02 NOTE — PATIENT INSTRUCTIONS
1. Eat 3 balanced meals each day - Monitor carb intake and limit to 45-60 grams per meal  This would be equal to 3-4 choices ~  1 choice = 15 grams    Do not wait longer than 4-5 hours to eat something  Snacks limit to no more than 30 grams of carbohydrates or 2 choices  Make sure you include protein source with each meal and at bedtime - this has been shown to help with blood glucose elevations    2. Check blood sugars once each day -continue to alternate times between a.m. fasting and 2 hours after dinner   Fasting and before meal target is 80 - 130   2 hours after a meal target is < 180  remember to bring meter and log book to all appointments    3. Incorporate 30 minutes activity into each day - does not need to be all at one time & walking counts    4.  Continue to work on diet and exercise for glucose management-call with any questions or concerns.

## 2023-03-27 ENCOUNTER — OFFICE VISIT (OUTPATIENT)
Dept: INTERNAL MEDICINE | Facility: CLINIC | Age: 68
End: 2023-03-27
Payer: MEDICARE

## 2023-03-27 VITALS
HEIGHT: 70 IN | HEART RATE: 74 BPM | BODY MASS INDEX: 28.2 KG/M2 | SYSTOLIC BLOOD PRESSURE: 134 MMHG | RESPIRATION RATE: 16 BRPM | DIASTOLIC BLOOD PRESSURE: 94 MMHG | TEMPERATURE: 97.3 F | OXYGEN SATURATION: 98 % | WEIGHT: 197 LBS

## 2023-03-27 DIAGNOSIS — I10 ESSENTIAL HYPERTENSION: Chronic | ICD-10-CM

## 2023-03-27 DIAGNOSIS — E11.65 TYPE 2 DIABETES MELLITUS WITH HYPERGLYCEMIA, WITHOUT LONG-TERM CURRENT USE OF INSULIN (H): ICD-10-CM

## 2023-03-27 LAB
ALBUMIN SERPL BCG-MCNC: 4.2 G/DL (ref 3.5–5.2)
ALP SERPL-CCNC: 134 U/L (ref 35–104)
ALT SERPL W P-5'-P-CCNC: 30 U/L (ref 10–35)
ANION GAP SERPL CALCULATED.3IONS-SCNC: 12 MMOL/L (ref 7–15)
AST SERPL W P-5'-P-CCNC: 27 U/L (ref 10–35)
BILIRUB SERPL-MCNC: 0.5 MG/DL
BUN SERPL-MCNC: 12.2 MG/DL (ref 8–23)
CALCIUM SERPL-MCNC: 9.8 MG/DL (ref 8.8–10.2)
CHLORIDE SERPL-SCNC: 103 MMOL/L (ref 98–107)
CREAT SERPL-MCNC: 0.82 MG/DL (ref 0.51–0.95)
DEPRECATED HCO3 PLAS-SCNC: 24 MMOL/L (ref 22–29)
GFR SERPL CREATININE-BSD FRML MDRD: 77 ML/MIN/1.73M2
GLUCOSE SERPL-MCNC: 158 MG/DL (ref 70–99)
HBA1C MFR BLD: 7 % (ref 0–5.6)
POTASSIUM SERPL-SCNC: 4.5 MMOL/L (ref 3.4–5.3)
PROT SERPL-MCNC: 7.1 G/DL (ref 6.4–8.3)
SODIUM SERPL-SCNC: 139 MMOL/L (ref 136–145)

## 2023-03-27 PROCEDURE — 83036 HEMOGLOBIN GLYCOSYLATED A1C: CPT | Performed by: NURSE PRACTITIONER

## 2023-03-27 PROCEDURE — 80053 COMPREHEN METABOLIC PANEL: CPT | Performed by: NURSE PRACTITIONER

## 2023-03-27 PROCEDURE — 36415 COLL VENOUS BLD VENIPUNCTURE: CPT | Performed by: NURSE PRACTITIONER

## 2023-03-27 PROCEDURE — 99214 OFFICE O/P EST MOD 30 MIN: CPT | Performed by: NURSE PRACTITIONER

## 2023-03-27 RX ORDER — GLIPIZIDE 10 MG/1
10 TABLET, FILM COATED, EXTENDED RELEASE ORAL DAILY
Qty: 90 TABLET | Refills: 3 | Status: SHIPPED | OUTPATIENT
Start: 2023-03-27 | End: 2023-06-26

## 2023-03-27 NOTE — PROGRESS NOTES
Assessment & Plan     1. Type 2 diabetes mellitus with hyperglycemia, without long-term current use of insulin (H): Not able to tolerate metformin and Jardiance was not covered by insurance.  Last a1c was 7.7; 3 months ago. a1c today was 7.0. Encouraged healthy eating and exercise. Will start on Glucotrol XL.   - HEMOGLOBIN A1C; Future  - Comprehensive metabolic panel (BMP + Alb, Alk Phos, ALT, AST, Total. Bili, TP);     2. Hypertension: Blood pressure today at 134/94. Goals for blood pressure are under 140/90; greater than 100/60. Watch salt intake, less than three grams per day. Continue working on diet and exercise.  Discussed with patient to check intermittently at home to see where her blood pressure is at, follow up with provider if running above goal.     Nathalia Hampton, Nurse Practitioner Student  [facility]       Stacey Aquino [facility]   Dorita is a 67 year old, presenting for the following health issues:  Follow Up (3 month follow up)    Dorita is being seen today for a 3 month diabetic follow up. She reports that she had been out of state for the last few months visiting a friend and just got home this week. She reports that her glucometer stopped working on the trip, but is working again.     She typically checks her blood sugars 1 time per day. She reports that she typically runs between 140-180. She does not go above 200 and doesn't have any low blood sugars. She reports that she sometimes feels fatigued and will eat something during this time, but overall, hasn't had any low blood sugars. She reports that she likes to go swimming for exercise and tries to incorporate fruits and healthy foods into her diet.     We discussed her blood pressure today. She reports that she hadn't taken her blood pressure medication yet, but would intermittently check her blood pressure at home.     Additional Questions 3/27/2023   Roomed by Flaquita ALDANA  "    History of Present Illness       Diabetes:   She presents for follow up of diabetes.  She is checking home blood glucose a few times a month. She checks blood glucose at bedtime.  Blood glucose is never over 200 and never under 70. When her blood glucose is low, the patient is asymptomatic for confusion, blurred vision, lethargy and reports not feeling dizzy, shaky, or weak.  She has no concerns regarding her diabetes at this time.  She is not experiencing numbness or burning in feet, excessive thirst, blurry vision, weight changes or redness, sores or blisters on feet.         She eats 2-3 servings of fruits and vegetables daily.She consumes 1 sweetened beverage(s) daily.She exercises with enough effort to increase her heart rate 10 to 19 minutes per day.  She exercises with enough effort to increase her heart rate 3 or less days per week. She is missing 1 dose(s) of medications per week.    Review of Systems   Review of Systems   Constitutional, HEENT, cardiovascular, pulmonary, GI, , musculoskeletal, neuro, skin, endocrine and psych systems are negative, except as otherwise noted.        Objective    BP (!) 134/94   Pulse 74   Temp 97.3  F (36.3  C)   Resp 16   Ht 1.778 m (5' 10\")   Wt 89.4 kg (197 lb)   SpO2 98%   BMI 28.27 kg/m    Body mass index is 28.27 kg/m .  Physical Exam   GENERAL: healthy, alert and no distress  RESP: lungs clear to auscultation - no rales, rhonchi or wheezes  CV: regular rate and rhythm, normal S1 S2, no S3 or S4, no murmur, click or rub, no peripheral edema and peripheral pulses strong  ABDOMEN: soft, nontender, no hepatosplenomegaly, no masses and bowel sounds normal  MS: no gross musculoskeletal defects noted, no edema  Diabetic foot exam: normal DP and PT pulses, no trophic changes or ulcerative lesions and normal sensory exam            "

## 2023-03-27 NOTE — PATIENT INSTRUCTIONS
It was great to meet you today.     Your labs are processing. We will let you know what the results are as soon as we get them back.    Spot check your blood pressure at least an hour after taking your medication. Target goals are less than 140/90.    Keep working on healthy diet and exercising regularly. We recommend 150 minutes of exercise weekly.     We will see you back in 3 months or sooner if anything comes up.

## 2023-04-10 ENCOUNTER — ALLIED HEALTH/NURSE VISIT (OUTPATIENT)
Dept: EDUCATION SERVICES | Facility: CLINIC | Age: 68
End: 2023-04-10
Payer: MEDICARE

## 2023-04-10 VITALS — BODY MASS INDEX: 28.55 KG/M2 | WEIGHT: 199 LBS

## 2023-04-10 DIAGNOSIS — E11.9 DIABETES MELLITUS, TYPE 2 (H): Primary | ICD-10-CM

## 2023-04-10 PROCEDURE — G0108 DIAB MANAGE TRN  PER INDIV: HCPCS

## 2023-04-10 PROCEDURE — 99207 PR NO CHARGE NURSE ONLY: CPT

## 2023-04-10 NOTE — PATIENT INSTRUCTIONS
1. Eat 3 balanced meals each day - Monitor carb intake and limit to 45-60 grams per meal  This would be equal to 3-4 choices ~  1 choice = 15 grams    Do not wait longer than 4-5 hours to eat something  Snacks limit to no more than 30 grams of carbohydrates or 2 choices  Make sure you include protein source with each meal and at bedtime - this has been shown to help with blood glucose elevations  Try some Crystal Light     2. Check blood sugars once each day - try to alternate the times you check between am fasting  OR  2 hours AFTER dinner    Fasting and before meal target is 80 - 130   2 hours after a meal target is < 180  remember to bring meter and log book to all appointments    3. Incorporate 30 minutes activity into each day - does not need to be all at one time & walking counts    4. Take diabetes medications as prescribed - okay with me if you hold off on taking Glipizide     CONGRATS ON YOUR A1C  WOOOO HOOOO !!!    7% is AWESOME !!   Keep it up !!

## 2023-04-10 NOTE — PROGRESS NOTES
Diabetes Self-Management Education & Support    Presents for: Individual review    Type of Service: In Person Visit    Assessment Type:   ASSESSMENT:  Dorita is a very pleasant 67-year-old who returns to clinic today to review blood glucose and assess/assist her with her current diabetes self-management skills.  She arrived today unaccompanied and had her meter and logbook with her.  Unfortunately she was having some problems with her meter while visiting friends in [place] for February and March so was unable to get blood glucose readings consistently.  Was receiving error messages E3 and 11- after looking things up both are errors for a damage for faulty strip.  Unsure if it was actually her strip or meter so I provided her with a new meter today.  Prior to her visit today I did review her most recent office visit notes as well as lab results and was happy to congratulate her on her most recent A1c of 7%.  I commended her on the hard work and effort she has put forth to achieve this and encouraged her to keep up the good work.  At her last visit with her provider, glipizide was prescribed for her.  She stated today that she really did not want to take this new medication and was wondering if it was absolutely necessary.  I informed her based upon her blood glucose readings as well as most recent A1c result, it would be okay if we held off on it for now.  Overall she is doing well.  She is watching her food choices and working on making sure her meals are balanced.  Instructed her to call with any questions or concerns and we will follow-up with her again in September.    Patient's most recent   Lab Results   Component Value Date    A1C 7.0 03/27/2023     is meeting goal of <7.0    Diabetes knowledge and skills assessment:   Patient is knowledgeable in diabetes management concepts related to: Healthy Eating, Monitoring, Reducing Risks and Healthy Coping    Continue education with the following diabetes  management concepts: Being Active and Reducing Risks    Based on learning assessment above, most appropriate setting for further diabetes education would be: Individual setting.      PLAN  1. Eat 3 balanced meals each day - Monitor carb intake and limit to 45-60 grams per meal  This would be equal to 3-4 choices ~  1 choice = 15 grams    Do not wait longer than 4-5 hours to eat something  Snacks limit to no more than 30 grams of carbohydrates or 2 choices  Make sure you include protein source with each meal and at bedtime - this has been shown to help with blood glucose elevations  Try some Crystal Light     2. Check blood sugars once each day - try to alternate the times you check between am fasting  OR  2 hours AFTER dinner    Fasting and before meal target is 80 - 130   2 hours after a meal target is < 180  remember to bring meter and log book to all appointments    3. Incorporate 30 minutes activity into each day - does not need to be all at one time & walking counts    4. Take diabetes medications as prescribed - okay with me if you hold off on taking Glipizide     Topics to cover at upcoming visits: Healthy Eating, Being Active, Reducing Risks and Healthy Coping    Follow-up: 9/18/23    See Care Plan for co-developed, patient-state behavior change goals.  AVS provided for patient today.    Education Materials Provided:  Accu-Chek Guide Me meter kit      SUBJECTIVE/OBJECTIVE:  Presents for: Individual review  Accompanied by: Self  Diabetes education in the past 24 mo: Yes  Focus of Visit: Monitoring, Reducing Risks, Taking Medication, Healthy Coping, Healthy Eating, Assistance w/ making life changes, Self-care behavioral goal setting, Being Active  Diabetes type: Type 2  Date of diagnosis: 2022  Disease course: Improving  Transportation concerns: No  Difficulty affording diabetes medication?: Sometimes  Other concerns:: None (TBI in 2016)  Cultural Influences/Ethnic Background:  Not  or  "      Diabetes Symptoms & Complications:  Fatigue: Yes (unrelated to DM)  Symptom course: Stable  Weight trend: Stable  Complications assessed today?: No    Patient Problem List and Family Medical History reviewed for relevant medical history, current medical status, and diabetes risk factors.    Vitals:  Wt 90.3 kg (199 lb)   BMI 28.55 kg/m    Estimated body mass index is 28.55 kg/m  as calculated from the following:    Height as of 3/27/23: 1.778 m (5' 10\").    Weight as of this encounter: 90.3 kg (199 lb).   Last 3 BP:   BP Readings from Last 3 Encounters:   03/27/23 (!) 134/94   12/12/22 120/88   09/08/22 128/78       History   Smoking Status     Some Days     Packs/day: 0.10     Years: 20.00     Types: Cigarettes   Smokeless Tobacco     Never       Labs:  Lab Results   Component Value Date    A1C 7.0 03/27/2023     Lab Results   Component Value Date     03/27/2023     06/03/2022     Lab Results   Component Value Date    LDL  06/03/2022      Comment:      Cannot estimate LDL when triglyceride exceeds 400 mg/dL     06/03/2022     Direct Measure HDL   Date Value Ref Range Status   06/03/2022 46 (L) >=50 mg/dL Final     Comment:     HDL Cholesterol Reference Range:     0-2 years:   No reference ranges established for patients under 2 years old  at [employer] for lipid analytes.    2-8 years:  Greater than 45 mg/dL     18 years and older:   Female: Greater than or equal to 50 mg/dL   Male:   Greater than or equal to 40 mg/dL   ]  GFR Estimate   Date Value Ref Range Status   03/27/2023 77 >60 mL/min/1.73m2 Final     Comment:     eGFR calculated using 2021 CKD-EPI equation.   07/16/2020 >60 >60 mL/min/1.73m2 Final     GFR Estimate If Black   Date Value Ref Range Status   07/16/2020 >60 >60 mL/min/1.73m2 Final     Lab Results   Component Value Date    CR 0.82 03/27/2023     No results found for: MICROALBUMIN    Healthy Eating:  Healthy Eating Assessed Today: " Yes  Cultural/Scientology diet restrictions?: No  Meal planning/habits: Smaller portions, Avoiding sweets  Meals include: Breakfast, Lunch, Dinner  Breakfast: 11-11:30 am : will have coffee with 1/2 and 1/2, bowl of Cheerios with 2% milk and either fresh berries or a half of a banana  Lunch: 2 pm : if she goes out to eat , it is often to [employer] -her and her sister are now splitting a sandwich and having fruit or a cup of soup instead of French fries at home may have frozen or pre made meals  Dinner: 6-6:30 pm: Last night was a roasted chicken with baked carrots, I have a cup of stuffing, 1 scoop of sweet potatoes and fresh berries  Snacks: 430 to 5 PM might have a handful of nuts and a mini can of either ginger ale or Coke  7.5 oz.   at bedtime might have 2 tablespoons of cottage cheese or couple of cheese slices  Other: Tries to have at least 2 fresh vegetables at dinner-mostly will be asparagus or green beans-still is at times waking up in the middle of the night and will have a small glass of milk, juice, or soda-fortunately her blood glucose readings remain in target  Beverages: Water, Milk, Alcohol, Coffee, Juice, Soda ( soda is one small can gingerale, juice- cran or apple in am)  I have suggested instead of the soda and juice she might try Crystal light.  She said she would  look into this  Has patient met with a dietitian in the past?: No    Being Active:  Being Active Assessed Today: Yes  Exercise:: Currently not exercising (Reported today she is going to work on getting back on a walking schedule-with goal being a minimum of 1 mile daily.  She also plans to resume water aerobics at the [facility])  How intense was your typical exercise? : Light (like stretching or slow walking)  Barrier to exercise: Safety, None (sometimes balance issues)    Monitoring:  Monitoring Assessed Today: Yes  Did patient bring glucose meter to appointment? : Yes (had logbook)  Blood Glucose Meter: Accu-chek  Times checking blood sugar  at home (number): 2  Times checking blood sugar at home (per): Day (AM fasting and again 2 hours after dinner)  Blood glucose trend: No change    Following are Dorita's most recent blood glucose readings, taking into consideration she missed checking the month of March.    FB, 168, 142, 145, 155, 141, 152, 151, 161, 153, 143    2-hour post dinner: 161, 167, 170, 183, 171, 163, 177  His readings were reviewed and discussed with Dorita during her visit today.  Informed her overall her blood glucose readings looked adequate and with her increase in activity, we both agree they will improve.    Taking Medications:  Diabetes Medication(s)     Sulfonylureas       glipiZIDE (GLUCOTROL XL) 10 MG 24 hr tablet    Take 1 tablet (10 mg) by mouth daily     Patient not taking: Reported on 4/10/2023          Taking Medication Assessed Today: Yes  Current Treatments: Diet  Problems taking diabetes medications regularly?: No  Diabetes medication side effects?:  (Stated today she feels like sometimes the metformin gives her an upset stomach, but this does not happen every day and it is not debilitating)    Problem Solving:  Problem Solving Assessed Today: Yes  Is the patient at risk for hypoglycemia?: No  Is the patient at risk for DKA?: No  Does patient have severe weather/disaster plan for diabetes management?: Not Needed  Does patient have sick day plan for diabetes management?: Not Needed              Reducing Risks:  Reducing Risks Assessed Today: Yes  Diabetes Risks: Age over 45 years, Sedentary Lifestyle, Family History  CAD Risks: Sedentary lifestyle, Post-menopausal, Hypertension, Diabetes Mellitus  Has dilated eye exam at least once a year?: Yes  Sees dentist every 6 months?: Yes  Feet checked by healthcare provider in the last year?: Yes    Healthy Coping:  Healthy Coping Assessed Today: Yes  Emotional response to diabetes: Ready to learn  Informal Support system:: Family  Stage of change: ACTION (Actively working  towards change)  Support resources: In-person Offerings  Patient Activation Measure Survey Score:       View : No data to display.                  Care Plan and Education Provided:  Care Plan: Diabetes   Updates made by Sandra Peng RN since 4/10/2023 12:00 AM      Problem: HbA1C Not In Goal       Goal: Establish Regular Follow-Ups with PCP    This Visit's Progress: 100%   Recent Progress: 100%   Note:    Follow-up appointments with both PCP and [facility] are scheduled.     Goal: Get HbA1C Level in Goal    This Visit's Progress: 100%   Recent Progress: 80%   Note:    A1c from 3/27/2023 returned at 7% which meets ADA goal     Problem: Diabetes Self-Management Education Needed to Optimize Self-Care Behaviors       Goal: Healthy Eating - follow a healthy eating pattern for diabetes    This Visit's Progress: 80%   Recent Progress: On track   Note:    Current dietary intake was reviewed today-recommendations made-have her continue to work on making sure each meal is well-balanced -overall she is doing well.     Goal: Being Active - get regular physical activity, working up to at least 150 minutes per week    This Visit's Progress: 30%   Recent Progress: On track   Note:    Activity level was reviewed and discussed with Dorita during her visit today.  She has set self goals to walk at least 1 mile daily and return to swimming aerobics at the [facility].     Goal: Monitoring - monitor glucose and ketones as directed    This Visit's Progress: 50%   Recent Progress: 80%   Note:    Was having problems with the operation of her meter/strips during the month of March but has resumed to checking blood glucose twice daily     Goal: Taking Medication - patient is consistently taking medications as directed    Recent Progress: 30%   Note:    Discussed the start of glipizide with Dorita today, she is hesitant-somewhat resistant to starting it.  I informed her with her current glucose readings being 1 they are is okay that she hold off for  now.  Has been managing adequately with diet     Goal: Problem Solving - know how to prevent and manage short-term diabetes complications    This Visit's Progress: On track   Recent Progress: 30%      Goal: Reducing Risks - know how to prevent and treat long-term diabetes complications    This Visit's Progress: 70%   Recent Progress: 80%   Note:    Up-to-date on feet, eye, dental and A1c is not meeting ADA goal     Goal: Healthy Coping - use available resources to cope with the challenges of managing diabetes    This Visit's Progress: 80%   Recent Progress: 30%   Note:    Dorita is doing well she is engaged with her management and continues to work towards better glycemic control and overall improved health.         Thank you,  Sandra Hill RN [facility]  Certified Diabetes Care and   Visit type : DSMT        Time Spent: 30 minutes  Encounter Type: Individual    Any diabetes medication dose changes were made via the CDE Protocol per the patient's referring provider and primary care provider. A copy of this encounter was shared with the provider.     Much or all of the text in this note was generated through the use of the Dragon Dictate voice-to-text software.Errors in spelling or words which seem out of context are unintentional. Sound alike errors, in particular, may have escaped editing.

## 2023-04-10 NOTE — LETTER
4/10/2023         RE: Dorita Stringer  [address]  [place] [address]        Dear Colleague,    Thank you for referring your patient, Dorita Stringer, to the [facility]. Please see a copy of my visit note below.    Diabetes Self-Management Education & Support    Presents for: Individual review    Type of Service: In Person Visit    Assessment Type:   ASSESSMENT:  Dorita is a very pleasant 67-year-old who returns to clinic today to review blood glucose and assess/assist her with her current diabetes self-management skills.  She arrived today unaccompanied and had her meter and logbook with her.  Unfortunately she was having some problems with her meter while visiting friends in [place] for February and March so was unable to get blood glucose readings consistently.  Was receiving error messages E3 and 11- after looking things up both are errors for a damage for faulty strip.  Unsure if it was actually her strip or meter so I provided her with a new meter today.  Prior to her visit today I did review her most recent office visit notes as well as lab results and was happy to congratulate her on her most recent A1c of 7%.  I commended her on the hard work and effort she has put forth to achieve this and encouraged her to keep up the good work.  At her last visit with her provider, glipizide was prescribed for her.  She stated today that she really did not want to take this new medication and was wondering if it was absolutely necessary.  I informed her based upon her blood glucose readings as well as most recent A1c result, it would be okay if we held off on it for now.  Overall she is doing well.  She is watching her food choices and working on making sure her meals are balanced.  Instructed her to call with any questions or concerns and we will follow-up with her again in September.    Patient's most recent   Lab Results   Component Value Date    A1C 7.0  03/27/2023     is meeting goal of <7.0    Diabetes knowledge and skills assessment:   Patient is knowledgeable in diabetes management concepts related to: Healthy Eating, Monitoring, Reducing Risks and Healthy Coping    Continue education with the following diabetes management concepts: Being Active and Reducing Risks    Based on learning assessment above, most appropriate setting for further diabetes education would be: Individual setting.      PLAN  1. Eat 3 balanced meals each day - Monitor carb intake and limit to 45-60 grams per meal  This would be equal to 3-4 choices ~  1 choice = 15 grams    Do not wait longer than 4-5 hours to eat something  Snacks limit to no more than 30 grams of carbohydrates or 2 choices  Make sure you include protein source with each meal and at bedtime - this has been shown to help with blood glucose elevations  Try some Crystal Light     2. Check blood sugars once each day - try to alternate the times you check between am fasting  OR  2 hours AFTER dinner    Fasting and before meal target is 80 - 130   2 hours after a meal target is < 180  remember to bring meter and log book to all appointments    3. Incorporate 30 minutes activity into each day - does not need to be all at one time & walking counts    4. Take diabetes medications as prescribed - okay with me if you hold off on taking Glipizide     Topics to cover at upcoming visits: Healthy Eating, Being Active, Reducing Risks and Healthy Coping    Follow-up: 9/18/23    See Care Plan for co-developed, patient-state behavior change goals.  AVS provided for patient today.    Education Materials Provided:  Accu-Chek Guide Me meter kit      SUBJECTIVE/OBJECTIVE:  Presents for: Individual review  Accompanied by: Self  Diabetes education in the past 24 mo: Yes  Focus of Visit: Monitoring, Reducing Risks, Taking Medication, Healthy Coping, Healthy Eating, Assistance w/ making life changes, Self-care behavioral goal setting, Being  "Active  Diabetes type: Type 2  Date of diagnosis: 2022  Disease course: Improving  Transportation concerns: No  Difficulty affording diabetes medication?: Sometimes  Other concerns:: None (TBI in 2016)  Cultural Influences/Ethnic Background:  Not  or       Diabetes Symptoms & Complications:  Fatigue: Yes (unrelated to DM)  Symptom course: Stable  Weight trend: Stable  Complications assessed today?: No    Patient Problem List and Family Medical History reviewed for relevant medical history, current medical status, and diabetes risk factors.    Vitals:  Wt 90.3 kg (199 lb)   BMI 28.55 kg/m    Estimated body mass index is 28.55 kg/m  as calculated from the following:    Height as of 3/27/23: 1.778 m (5' 10\").    Weight as of this encounter: 90.3 kg (199 lb).   Last 3 BP:   BP Readings from Last 3 Encounters:   03/27/23 (!) 134/94   12/12/22 120/88   09/08/22 128/78       History   Smoking Status     Some Days     Packs/day: 0.10     Years: 20.00     Types: Cigarettes   Smokeless Tobacco     Never       Labs:  Lab Results   Component Value Date    A1C 7.0 03/27/2023     Lab Results   Component Value Date     03/27/2023     06/03/2022     Lab Results   Component Value Date    LDL  06/03/2022      Comment:      Cannot estimate LDL when triglyceride exceeds 400 mg/dL     06/03/2022     Direct Measure HDL   Date Value Ref Range Status   06/03/2022 46 (L) >=50 mg/dL Final     Comment:     HDL Cholesterol Reference Range:     0-2 years:   No reference ranges established for patients under 2 years old  [facility] lipid analytes.    2-8 years:  Greater than 45 mg/dL     18 years and older:   Female: Greater than or equal to 50 mg/dL   Male:   Greater than or equal to 40 mg/dL   ]  GFR Estimate   Date Value Ref Range Status   03/27/2023 77 >60 mL/min/1.73m2 Final     Comment:     eGFR calculated using 2021 CKD-EPI equation.   07/16/2020 >60 >60 mL/min/1.73m2 Final     GFR " Estimate If Black   Date Value Ref Range Status   07/16/2020 >60 >60 mL/min/1.73m2 Final     Lab Results   Component Value Date    CR 0.82 03/27/2023     No results found for: MICROALBUMIN    Healthy Eating:  Healthy Eating Assessed Today: Yes  Cultural/Presybeterian diet restrictions?: No  Meal planning/habits: Smaller portions, Avoiding sweets  Meals include: Breakfast, Lunch, Dinner  Breakfast: 11-11:30 am : will have coffee with 1/2 and 1/2, bowl of Cheerios with 2% milk and either fresh berries or a half of a banana  Lunch: 2 pm : if she goes out to eat , it is often to [employer] -her and her sister are now splitting a sandwich and having fruit or a cup of soup instead of French fries at home may have frozen or pre made meals  Dinner: 6-6:30 pm: Last night was a roasted chicken with baked carrots, I have a cup of stuffing, 1 scoop of sweet potatoes and fresh berries  Snacks: 430 to 5 PM might have a handful of nuts and a mini can of either ginger ale or Coke  7.5 oz.   at bedtime might have 2 tablespoons of cottage cheese or couple of cheese slices  Other: Tries to have at least 2 fresh vegetables at dinner-mostly will be asparagus or green beans-still is at times waking up in the middle of the night and will have a small glass of milk, juice, or soda-fortunately her blood glucose readings remain in target  Beverages: Water, Milk, Alcohol, Coffee, Juice, Soda ( soda is one small can gingerale, juice- cran or apple in am)  I have suggested instead of the soda and juice she might try Crystal light.  She said she would  look into this  Has patient met with a dietitian in the past?: No    Being Active:  Being Active Assessed Today: Yes  Exercise:: Currently not exercising (Reported today she is going to work on getting back on a walking schedule-with goal being a minimum of 1 mile daily.  She also plans to resume water aerobics at the [facility])  How intense was your typical exercise? : Light (like stretching or slow  walking)  Barrier to exercise: Safety, None (sometimes balance issues)    Monitoring:  Monitoring Assessed Today: Yes  Did patient bring glucose meter to appointment? : Yes (had logbook)  Blood Glucose Meter: Accu-chek  Times checking blood sugar at home (number): 2  Times checking blood sugar at home (per): Day (AM fasting and again 2 hours after dinner)  Blood glucose trend: No change    Following are Dorita's most recent blood glucose readings, taking into consideration she missed checking the month of March.    FB, 168, 142, 145, 155, 141, 152, 151, 161, 153, 143    2-hour post dinner: 161, 167, 170, 183, 171, 163, 177  His readings were reviewed and discussed with Dorita during her visit today.  Informed her overall her blood glucose readings looked adequate and with her increase in activity, we both agree they will improve.    Taking Medications:  Diabetes Medication(s)     Sulfonylureas       glipiZIDE (GLUCOTROL XL) 10 MG 24 hr tablet    Take 1 tablet (10 mg) by mouth daily     Patient not taking: Reported on 4/10/2023          Taking Medication Assessed Today: Yes  Current Treatments: Diet  Problems taking diabetes medications regularly?: No  Diabetes medication side effects?:  (Stated today she feels like sometimes the metformin gives her an upset stomach, but this does not happen every day and it is not debilitating)    Problem Solving:  Problem Solving Assessed Today: Yes  Is the patient at risk for hypoglycemia?: No  Is the patient at risk for DKA?: No  Does patient have severe weather/disaster plan for diabetes management?: Not Needed  Does patient have sick day plan for diabetes management?: Not Needed              Reducing Risks:  Reducing Risks Assessed Today: Yes  Diabetes Risks: Age over 45 years, Sedentary Lifestyle, Family History  CAD Risks: Sedentary lifestyle, Post-menopausal, Hypertension, Diabetes Mellitus  Has dilated eye exam at least once a year?: Yes  Sees dentist every 6 months?:  Yes  Feet checked by healthcare provider in the last year?: Yes    Healthy Coping:  Healthy Coping Assessed Today: Yes  Emotional response to diabetes: Ready to learn  Informal Support system:: Family  Stage of change: ACTION (Actively working towards change)  Support resources: In-person Offerings  Patient Activation Measure Survey Score:       View : No data to display.                  Care Plan and Education Provided:  Care Plan: Diabetes   Updates made by Sandra Peng RN since 4/10/2023 12:00 AM      Problem: HbA1C Not In Goal       Goal: Establish Regular Follow-Ups with PCP    This Visit's Progress: 100%   Recent Progress: 100%   Note:    Follow-up appointments with both PCP and [facility] are scheduled.     Goal: Get HbA1C Level in Goal    This Visit's Progress: 100%   Recent Progress: 80%   Note:    A1c from 3/27/2023 returned at 7% which meets ADA goal     Problem: Diabetes Self-Management Education Needed to Optimize Self-Care Behaviors       Goal: Healthy Eating - follow a healthy eating pattern for diabetes    This Visit's Progress: 80%   Recent Progress: On track   Note:    Current dietary intake was reviewed today-recommendations made-have her continue to work on making sure each meal is well-balanced -overall she is doing well.     Goal: Being Active - get regular physical activity, working up to at least 150 minutes per week    This Visit's Progress: 30%   Recent Progress: On track   Note:    Activity level was reviewed and discussed with Dorita during her visit today.  She has set self goals to walk at least 1 mile daily and [facility]     Goal: Monitoring - monitor glucose and ketones as directed    This Visit's Progress: 50%   Recent Progress: 80%   Note:    Was having problems with the operation of her meter/strips during the month of March but has resumed to checking blood glucose twice daily     Goal: Taking Medication - patient is consistently taking medications  as directed    Recent Progress: 30%   Note:    Discussed the start of glipizide with Dorita today, she is hesitant-somewhat resistant to starting it.  I informed her with her current glucose readings being 1 they are is okay that she hold off for now.  Has been managing adequately with diet     Goal: Problem Solving - know how to prevent and manage short-term diabetes complications    This Visit's Progress: On track   Recent Progress: 30%      Goal: Reducing Risks - know how to prevent and treat long-term diabetes complications    This Visit's Progress: 70%   Recent Progress: 80%   Note:    Up-to-date on feet, eye, dental and A1c is not meeting ADA goal     Goal: Healthy Coping - use available resources to cope with the challenges of managing diabetes    This Visit's Progress: 80%   Recent Progress: 30%   Note:    Dorita is doing well she is engaged with her management and continues to work towards better glycemic control and overall improved health.         Thank you,  Sandra Hill RN CDCES  Certified Diabetes Care and   Visit type : DSMT        Time Spent: 30 minutes  Encounter Type: Individual    Any diabetes medication dose changes were made via the CDE Protocol per the patient's referring provider and primary care provider. A copy of this encounter was shared with the provider.     Much or all of the text in this note was generated through the use of the Dragon Dictate voice-to-text software.Errors in spelling or words which seem out of context are unintentional. Sound alike errors, in particular, may have escaped editing.

## 2023-06-01 DIAGNOSIS — F41.9 ANXIETY: ICD-10-CM

## 2023-06-01 DIAGNOSIS — M1A.00X0 IDIOPATHIC CHRONIC GOUT WITHOUT TOPHUS, UNSPECIFIED SITE: ICD-10-CM

## 2023-06-02 RX ORDER — ESCITALOPRAM OXALATE 10 MG/1
10 TABLET ORAL DAILY
Qty: 90 TABLET | Refills: 2 | Status: SHIPPED | OUTPATIENT
Start: 2023-06-02 | End: 2024-02-26

## 2023-06-02 RX ORDER — ALLOPURINOL 100 MG/1
100 TABLET ORAL DAILY
Qty: 90 TABLET | Refills: 3 | Status: SHIPPED | OUTPATIENT
Start: 2023-06-02 | End: 2024-06-11

## 2023-06-02 NOTE — TELEPHONE ENCOUNTER
"Routing refill request to provider for review/approval because:  Labs not current    Last Written Prescription Date:  5/11/2022  Last Fill Quantity: 90,  # refills: 3   Last office visit provider:  3/27/2023     Requested Prescriptions   Pending Prescriptions Disp Refills     allopurinol (ZYLOPRIM) 100 MG tablet 90 tablet 3     Sig: Take 1 tablet (100 mg) by mouth daily       Gout Agents Protocol Failed - 6/1/2023  8:52 AM        Failed - Has Uric Acid on file in past 12 months and value is less than 6     Recent Labs   Lab Test 05/07/18  1230   URIC 5.7     If level is 6mg/dL or greater, ok to refill one time and refer to provider.           Passed - CBC on file in past 12 months     Recent Labs   Lab Test 09/08/22  1343   WBC 8.4   RBC 4.45   HGB 14.5   HCT 43.1                    Passed - ALT on file in past 12 months     Recent Labs   Lab Test 03/27/23  1201   ALT 30             Passed - Recent (12 mo) or future (30 days) visit within the authorizing provider's specialty     Patient has had an office visit with the authorizing provider or a provider within the authorizing providers department within the previous 12 mos or has a future within next 30 days. See \"Patient Info\" tab in inbasket, or \"Choose Columns\" in Meds & Orders section of the refill encounter.              Passed - Medication is active on med list        Passed - Patient is age 18 or older        Passed - No active pregnancy on record        Passed - Normal serum creatinine on file in the past 12 months     Recent Labs   Lab Test 03/27/23  1201   CR 0.82       Ok to refill medication if creatinine is low          Passed - No positive pregnancy test in past year             Lizett Ziegler RN 06/02/23 9:34 AM  "

## 2023-06-02 NOTE — TELEPHONE ENCOUNTER
"    Last Written Prescription Date:  5/11/2022  Last Fill Quantity: 90,  # refills: 3   Last office visit provider:  3/27/2023     Requested Prescriptions   Pending Prescriptions Disp Refills     escitalopram (LEXAPRO) 10 MG tablet 90 tablet 3     Sig: Take 1 tablet (10 mg) by mouth daily       SSRIs Protocol Passed - 6/1/2023  8:51 AM        Passed - Recent (12 mo) or future (30 days) visit within the authorizing provider's specialty     Patient has had an office visit with the authorizing provider or a provider within the authorizing providers department within the previous 12 mos or has a future within next 30 days. See \"Patient Info\" tab in inbasket, or \"Choose Columns\" in Meds & Orders section of the refill encounter.              Passed - Medication is active on med list        Passed - Patient is age 18 or older        Passed - No active pregnancy on record        Passed - No positive pregnancy test in last 12 months             Lizett Ziegler RN 06/02/23 9:30 AM  "

## 2023-06-05 ENCOUNTER — TELEPHONE (OUTPATIENT)
Dept: INTERNAL MEDICINE | Facility: CLINIC | Age: 68
End: 2023-06-05
Payer: MEDICARE

## 2023-06-05 NOTE — TELEPHONE ENCOUNTER
Pharmacist called, stated they have sent us a fax three times as they are being audited by medicare and need records for this patient (last wellness visit notes prior to 4/27/2023). Fax number for internal med provided [phone].    ANDREA Freedman

## 2023-06-26 ENCOUNTER — OFFICE VISIT (OUTPATIENT)
Dept: INTERNAL MEDICINE | Facility: CLINIC | Age: 68
End: 2023-06-26
Payer: MEDICARE

## 2023-06-26 VITALS
RESPIRATION RATE: 16 BRPM | WEIGHT: 198 LBS | SYSTOLIC BLOOD PRESSURE: 120 MMHG | BODY MASS INDEX: 28.35 KG/M2 | HEART RATE: 84 BPM | OXYGEN SATURATION: 96 % | DIASTOLIC BLOOD PRESSURE: 80 MMHG | HEIGHT: 70 IN | TEMPERATURE: 97.4 F

## 2023-06-26 DIAGNOSIS — Z13.0 SCREENING FOR ENDOCRINE, NUTRITIONAL, METABOLIC AND IMMUNITY DISORDER: ICD-10-CM

## 2023-06-26 DIAGNOSIS — F32.5 MAJOR DEPRESSIVE DISORDER WITH SINGLE EPISODE, IN FULL REMISSION (H): ICD-10-CM

## 2023-06-26 DIAGNOSIS — Z13.21 SCREENING FOR ENDOCRINE, NUTRITIONAL, METABOLIC AND IMMUNITY DISORDER: ICD-10-CM

## 2023-06-26 DIAGNOSIS — Z13.228 SCREENING FOR ENDOCRINE, NUTRITIONAL, METABOLIC AND IMMUNITY DISORDER: ICD-10-CM

## 2023-06-26 DIAGNOSIS — R79.89 ELEVATED LIVER FUNCTION TESTS: ICD-10-CM

## 2023-06-26 DIAGNOSIS — Z12.11 SCREEN FOR COLON CANCER: ICD-10-CM

## 2023-06-26 DIAGNOSIS — E66.3 OVERWEIGHT (BMI 25.0-29.9): ICD-10-CM

## 2023-06-26 DIAGNOSIS — R21 RASH: ICD-10-CM

## 2023-06-26 DIAGNOSIS — F41.9 ANXIETY: ICD-10-CM

## 2023-06-26 DIAGNOSIS — E55.9 VITAMIN D DEFICIENCY: ICD-10-CM

## 2023-06-26 DIAGNOSIS — M1A.00X0 IDIOPATHIC CHRONIC GOUT WITHOUT TOPHUS, UNSPECIFIED SITE: ICD-10-CM

## 2023-06-26 DIAGNOSIS — I10 ESSENTIAL HYPERTENSION: ICD-10-CM

## 2023-06-26 DIAGNOSIS — Z00.00 ENCOUNTER FOR ANNUAL PHYSICAL EXAM: Primary | ICD-10-CM

## 2023-06-26 DIAGNOSIS — Z13.29 SCREENING FOR ENDOCRINE, NUTRITIONAL, METABOLIC AND IMMUNITY DISORDER: ICD-10-CM

## 2023-06-26 DIAGNOSIS — Z12.31 VISIT FOR SCREENING MAMMOGRAM: ICD-10-CM

## 2023-06-26 DIAGNOSIS — E11.65 TYPE 2 DIABETES MELLITUS WITH HYPERGLYCEMIA, WITHOUT LONG-TERM CURRENT USE OF INSULIN (H): ICD-10-CM

## 2023-06-26 LAB
ALBUMIN SERPL BCG-MCNC: 4.4 G/DL (ref 3.5–5.2)
ALP SERPL-CCNC: 126 U/L (ref 35–104)
ALT SERPL W P-5'-P-CCNC: 34 U/L (ref 0–50)
ANION GAP SERPL CALCULATED.3IONS-SCNC: 12 MMOL/L (ref 7–15)
AST SERPL W P-5'-P-CCNC: 30 U/L (ref 0–45)
BASOPHILS # BLD AUTO: 0.1 10E3/UL (ref 0–0.2)
BASOPHILS NFR BLD AUTO: 1 %
BILIRUB SERPL-MCNC: 0.4 MG/DL
BUN SERPL-MCNC: 15.3 MG/DL (ref 8–23)
CALCIUM SERPL-MCNC: 9.7 MG/DL (ref 8.8–10.2)
CHLORIDE SERPL-SCNC: 104 MMOL/L (ref 98–107)
CREAT SERPL-MCNC: 0.85 MG/DL (ref 0.51–0.95)
DEPRECATED CALCIDIOL+CALCIFEROL SERPL-MC: 44 UG/L (ref 20–75)
DEPRECATED HCO3 PLAS-SCNC: 23 MMOL/L (ref 22–29)
EOSINOPHIL # BLD AUTO: 0.7 10E3/UL (ref 0–0.7)
EOSINOPHIL NFR BLD AUTO: 8 %
ERYTHROCYTE [DISTWIDTH] IN BLOOD BY AUTOMATED COUNT: 12.4 % (ref 10–15)
GFR SERPL CREATININE-BSD FRML MDRD: 74 ML/MIN/1.73M2
GLUCOSE SERPL-MCNC: 163 MG/DL (ref 70–99)
HBA1C MFR BLD: 6.9 % (ref 0–5.6)
HCT VFR BLD AUTO: 43.4 % (ref 35–47)
HGB BLD-MCNC: 14.4 G/DL (ref 11.7–15.7)
IMM GRANULOCYTES # BLD: 0 10E3/UL
IMM GRANULOCYTES NFR BLD: 0 %
LYMPHOCYTES # BLD AUTO: 1.8 10E3/UL (ref 0.8–5.3)
LYMPHOCYTES NFR BLD AUTO: 21 %
MCH RBC QN AUTO: 32.6 PG (ref 26.5–33)
MCHC RBC AUTO-ENTMCNC: 33.2 G/DL (ref 31.5–36.5)
MCV RBC AUTO: 98 FL (ref 78–100)
MONOCYTES # BLD AUTO: 0.6 10E3/UL (ref 0–1.3)
MONOCYTES NFR BLD AUTO: 7 %
NEUTROPHILS # BLD AUTO: 5.4 10E3/UL (ref 1.6–8.3)
NEUTROPHILS NFR BLD AUTO: 63 %
PLATELET # BLD AUTO: 231 10E3/UL (ref 150–450)
POTASSIUM SERPL-SCNC: 4.4 MMOL/L (ref 3.4–5.3)
PROT SERPL-MCNC: 7.4 G/DL (ref 6.4–8.3)
RBC # BLD AUTO: 4.42 10E6/UL (ref 3.8–5.2)
SODIUM SERPL-SCNC: 139 MMOL/L (ref 136–145)
WBC # BLD AUTO: 8.6 10E3/UL (ref 4–11)

## 2023-06-26 PROCEDURE — 36415 COLL VENOUS BLD VENIPUNCTURE: CPT | Performed by: NURSE PRACTITIONER

## 2023-06-26 PROCEDURE — 82306 VITAMIN D 25 HYDROXY: CPT | Performed by: NURSE PRACTITIONER

## 2023-06-26 PROCEDURE — 85025 COMPLETE CBC W/AUTO DIFF WBC: CPT | Performed by: NURSE PRACTITIONER

## 2023-06-26 PROCEDURE — G0438 PPPS, INITIAL VISIT: HCPCS | Performed by: NURSE PRACTITIONER

## 2023-06-26 PROCEDURE — 99214 OFFICE O/P EST MOD 30 MIN: CPT | Mod: 25 | Performed by: NURSE PRACTITIONER

## 2023-06-26 PROCEDURE — 83036 HEMOGLOBIN GLYCOSYLATED A1C: CPT | Performed by: NURSE PRACTITIONER

## 2023-06-26 PROCEDURE — 80053 COMPREHEN METABOLIC PANEL: CPT | Performed by: NURSE PRACTITIONER

## 2023-06-26 RX ORDER — TRIAMCINOLONE ACETONIDE 1 MG/G
CREAM TOPICAL
Qty: 45 G | Refills: 3 | Status: SHIPPED | OUTPATIENT
Start: 2023-06-26

## 2023-06-26 ASSESSMENT — ENCOUNTER SYMPTOMS
HEMATURIA: 0
SORE THROAT: 0
NAUSEA: 0
DIZZINESS: 0
HEADACHES: 0
WEAKNESS: 0
PALPITATIONS: 0
EYE PAIN: 0
HEARTBURN: 0
JOINT SWELLING: 0
NERVOUS/ANXIOUS: 0
ARTHRALGIAS: 0
FEVER: 0
SHORTNESS OF BREATH: 0
CHILLS: 0
FREQUENCY: 0
BREAST MASS: 0
CONSTIPATION: 0
HEMATOCHEZIA: 0
MYALGIAS: 0
DYSURIA: 0
COUGH: 0
DIARRHEA: 0
PARESTHESIAS: 0
ABDOMINAL PAIN: 0

## 2023-06-26 ASSESSMENT — PATIENT HEALTH QUESTIONNAIRE - PHQ9
SUM OF ALL RESPONSES TO PHQ QUESTIONS 1-9: 1
SUM OF ALL RESPONSES TO PHQ QUESTIONS 1-9: 1

## 2023-06-26 ASSESSMENT — ACTIVITIES OF DAILY LIVING (ADL): CURRENT_FUNCTION: NO ASSISTANCE NEEDED

## 2023-06-26 NOTE — PATIENT INSTRUCTIONS
Your labs are processing at this time, I will release results to [facility] once they are back.    To schedule your mammogram call [phone].    To schedule your follow-up colonoscopy call [phone].    At your follow-up we will check your cholesterol, please come fasted for this, an 8-hour fast is required.    I have ordered the triamcinolone cream.  Use this as needed for bug bites when in [place].    You are due for the shingles series, get these done at a local pharmacy such as [facility], [facility], [facility].    I will see you back in 6 months for follow-up, please let me know before then if anything comes up.

## 2023-06-26 NOTE — PROGRESS NOTES
"SUBJECTIVE:   Dorita is a 67 year old who presents for Preventive Visit.      6/26/2023    10:48 AM   Additional Questions   Roomed by Flaquita ALDANA     Are you in the first 12 months of your Medicare coverage?  No    The patient presents today for annual Medicare wellness visit.  She is not fast today.    She is due for follow-up colonoscopy, last was done in 10/26/2016 was supposed to be rechecked in 2019.  Will order this today.    Her mammogram was due last was done on 6/10/2022, we will reorder this today.    Discussed that she is due for the shingles vaccines, she will try to get these done at a local pharmacy.    She reports that she would like some sort of steroid cream to help with any possible bug bites when she goes up to [place] in the next week or so.  We will send her some triamcinolone to help with this.    She denies any other new or concerns today.    Healthy Habits:     In general, how would you rate your overall health?  Good    Frequency of exercise:  2-3 days/week    Duration of exercise:  15-30 minutes    Do you usually eat at least 4 servings of fruit and vegetables a day, include whole grains    & fiber and avoid regularly eating high fat or \"junk\" foods?  No    Taking medications regularly:  Yes    Medication side effects:  None    Ability to successfully perform activities of daily living:  No assistance needed    Home Safety:  No safety concerns identified    Hearing Impairment:  Difficulty following a conversation in a noisy restaurant or crowded room    In the past 6 months, have you been bothered by leaking of urine?  No    In general, how would you rate your overall mental or emotional health?  Good      PHQ-2 Total Score: 0    Additional concerns today:  No        Have you ever done Advance Care Planning? (For example, a Health Directive, POLST, or a discussion with a medical provider or your loved ones about your wishes): No, advance care planning information given to patient to review.  " Patient plans to discuss their wishes with loved ones or provider.        Fall risk  Fallen 2 or more times in the past year?: No  Any fall with injury in the past year?: No    Cognitive Screening   1) Repeat 3 items (Leader, Season, Table)    2) Clock draw: NORMAL  3) 3 item recall: Recalls 3 objects  Results: 3 items recalled: COGNITIVE IMPAIRMENT LESS LIKELY    Mini-CogTM Copyright HAJA Barrera. Licensed by the author for use in [facility]; reprinted with permission (wilberto@[facility]). All rights reserved.      Do you have sleep apnea, excessive snoring or daytime drowsiness?: no    Reviewed and updated as needed this visit by clinical staff   Tobacco  Allergies  Meds              Reviewed and updated as needed this visit by Provider                 Social History     Tobacco Use     Smoking status: Some Days     Packs/day: 0.10     Years: 20.00     Pack years: 2.00     Types: Cigarettes     Smokeless tobacco: Never   Substance Use Topics     Alcohol use: Yes     Alcohol/week: 2.0 standard drinks of alcohol           6/26/2023    10:57 AM   Alcohol Use   Prescreen: >3 drinks/day or >7 drinks/week? No     Do you have a current opioid prescription? No  Do you use any other controlled substances or medications that are not prescribed by a provider? None    Current providers sharing in care for this patient include:   Patient Care Team:  Stacey Aquino CNP as PCP - General (Nurse Practitioner - Gerontology)  Stacey Aquino CNP as Assigned PCP  Sandra Peng RN as Registered Nurse (Diabetes Education)    The following health maintenance items are reviewed in Epic and correct as of today:  Health Maintenance   Topic Date Due     DEPRESSION ACTION PLAN  Never done     ZOSTER IMMUNIZATION (1 of 2) Never done     LUNG CANCER SCREENING  Never done     COLORECTAL CANCER SCREENING  10/26/2019     COVID-19 Vaccine (6 - Pfizer series) 03/29/2023     LIPID  06/03/2023     MAMMO SCREENING  06/10/2023      MICROALBUMIN  09/08/2023     A1C  09/26/2023     EYE EXAM  11/01/2023     PHQ-9  12/26/2023     BMP  03/27/2024     DIABETIC FOOT EXAM  03/28/2024     NICOTINE/TOBACCO CESSATION COUNSELING Q 1 YR  06/26/2024     MEDICARE ANNUAL WELLNESS VISIT  06/26/2024     ANNUAL REVIEW OF HM ORDERS  06/26/2024     FALL RISK ASSESSMENT  06/26/2024     DTAP/TDAP/TD IMMUNIZATION (4 - Td or Tdap) 05/07/2028     ADVANCE CARE PLANNING  06/26/2028     DEXA  08/06/2035     INFLUENZA VACCINE  Completed     Pneumococcal Vaccine: 65+ Years  Completed     IPV IMMUNIZATION  Aged Out     MENINGITIS IMMUNIZATION  Aged Out     HEPATITIS C SCREENING  Discontinued     Lab work is in process  Mammogram Screening: Mammogram Screening: Recommended mammography every 1-2 years with patient discussion and risk factor consideration  Any new diagnosis of family breast, ovarian, or bowel cancer? Yes    FHS-7:       6/10/2022     9:21 AM 9/8/2022    12:53 PM 6/26/2023    10:58 AM   Breast CA Risk Assessment (FHS-7)   Did any of your first-degree relatives have breast or ovarian cancer? Yes Yes Yes   Did any of your relatives have bilateral breast cancer? No Unknown Unknown   Did any man in your family have breast cancer? No No No   Did any woman in your family have breast and ovarian cancer? No Yes Yes   Did any woman in your family have breast cancer before age 50 y? No Yes Yes   Do you have 2 or more relatives with breast and/or ovarian cancer? No Unknown Yes   Do you have 2 or more relatives with breast and/or bowel cancer? No Unknown Yes     Mammogram Screening: Recommended mammography every 1-2 years with patient discussion and risk factor consideration  Pertinent mammograms are reviewed under the imaging tab.    Review of Systems   Constitutional: Negative for chills and fever.   HENT: Negative for congestion, ear pain, hearing loss and sore throat.    Eyes: Negative for pain and visual disturbance.   Respiratory: Negative for cough and shortness of  "breath.    Cardiovascular: Negative for chest pain, palpitations and peripheral edema.   Gastrointestinal: Negative for abdominal pain, constipation, diarrhea, heartburn, hematochezia and nausea.   Breasts:  Negative for tenderness, breast mass and discharge.   Genitourinary: Negative for dysuria, frequency, genital sores, hematuria, pelvic pain, urgency, vaginal bleeding and vaginal discharge.   Musculoskeletal: Negative for arthralgias, joint swelling and myalgias.   Skin: Negative for rash.   Neurological: Negative for dizziness, weakness, headaches and paresthesias.   Psychiatric/Behavioral: Negative for mood changes. The patient is not nervous/anxious.        OBJECTIVE:   /80 (BP Location: Right arm, Patient Position: Sitting)   Pulse 84   Temp 97.4  F (36.3  C)   Resp 16   Ht 1.765 m (5' 9.5\")   Wt 89.8 kg (198 lb)   SpO2 96%   BMI 28.82 kg/m   Estimated body mass index is 28.82 kg/m  as calculated from the following:    Height as of this encounter: 1.765 m (5' 9.5\").    Weight as of this encounter: 89.8 kg (198 lb).  Physical Exam  GENERAL APPEARANCE: healthy, alert and no distress  EYES: Eyes grossly normal to inspection, PERRL and conjunctivae and sclerae normal  HENT: ear canals and TM's normal, nose and mouth without ulcers or lesions, oropharynx clear and oral mucous membranes moist  NECK: no adenopathy, no asymmetry, masses, or scars and thyroid normal to palpation  RESP: lungs clear to auscultation - no rales, rhonchi or wheezes  CV: regular rate and rhythm, normal S1 S2, no S3 or S4, no murmur, click or rub, no peripheral edema and peripheral pulses strong  ABDOMEN: soft, nontender, no hepatosplenomegaly, no masses and bowel sounds normal  MS: no musculoskeletal defects are noted and gait is age appropriate without ataxia  SKIN: no suspicious lesions or rashes  NEURO: Normal strength and tone, sensory exam grossly normal, mentation intact and speech normal  PSYCH: mentation appears normal " and affect normal/bright    Diagnostic Test Results: mammogram, colonoscopy  Labs reviewed in Epic    ASSESSMENT / PLAN:   Dortia was seen today for wellness visit.    Diagnoses and all orders for this visit:    Encounter for annual physical exam: Completed today, will check labs. Cholesterol check at follow up, fasted. Mammogram and follow up colonoscopy ordered.     Anxiety: She continues on Lexapro. Stable.     Major depressive disorder with single episode, in full remission (H): PHQ-9 score today was a 1. She continues on Lexparo. Stable.     Essential hypertension: Blood pressure today was 120/80. She continues on Losartan. Stable.   -     Comprehensive metabolic panel (BMP + Alb, Alk Phos, ALT, AST, Total. Bili, TP)    Type 2 diabetes mellitus with hyperglycemia, without long-term current use of insulin (H): Will recheck her A1c today. Controlled with diet and exercise.   -     HEMOGLOBIN A1C    Elevated liver function tests: hx of this in the past, will check a CMP.   -     Comprehensive metabolic panel (BMP + Alb, Alk Phos, ALT, AST, Total. Bili, TP); Future    Overweight (BMI 25.0-29.9): BMI today was 28.82. Continue working on diet and exercise.     Rash: Ordered TMC cream as needed for bug bites.   -     triamcinolone (KENALOG) 0.1 % external cream; [TRIAMCINOLONE (KENALOG) 0.1 % CREAM] APPLY EXTERNALLY TO RASH DAILY AS NEEDED    Idiopathic chronic gout without tophus, unspecified site    Vitamin D deficiency: Will recheck labs.   -     Vitamin D Deficiency    Screening for endocrine, nutritional, metabolic and immunity disorder  -     CBC with platelets and differential    Screen for colon cancer  -     Colonoscopy Screening  Referral; Future    Visit for screening mammogram  -     MA SCREENING DIGITAL BILAT - Future  (s+30); Future    Other orders  -     REVIEW OF HEALTH MAINTENANCE PROTOCOL ORDERS    Patient has been advised of split billing requirements and indicates understanding:  "Yes      COUNSELING:  Reviewed preventive health counseling, as reflected in patient instructions  Special attention given to:       Regular exercise       Healthy diet/nutrition       Vision screening       Hearing screening      BMI:   Estimated body mass index is 28.82 kg/m  as calculated from the following:    Height as of this encounter: 1.765 m (5' 9.5\").    Weight as of this encounter: 89.8 kg (198 lb).   Weight management plan: Discussed healthy diet and exercise guidelines      She reports that she has been smoking cigarettes. She has a 2.00 pack-year smoking history. She has never used smokeless tobacco.  Nicotine/Tobacco Cessation Plan:   Information offered: Patient not interested at this time      Appropriate preventive services were discussed with this patient, including applicable screening as appropriate for cardiovascular disease, diabetes, osteopenia/osteoporosis, and glaucoma.  As appropriate for age/gender, discussed screening for colorectal cancer, prostate cancer, breast cancer, and cervical cancer. Checklist reviewing preventive services available has been given to the patient.    Reviewed patients plan of care and provided an AVS. The Intermediate Care Plan ( asthma action plan, low back pain action plan, and migraine action plan) for Dorita meets the Care Plan requirement. This Care Plan has been established and reviewed with the Patient.      Stacey Aquino CNP  [facility]    Identified Health Risks:    I have reviewed Opioid Use Disorder and Substance Use Disorder risk factors and made any needed referrals.     Answers for HPI/ROS submitted by the patient on 6/26/2023  PHQ9 TOTAL SCORE: 1      "

## 2023-07-12 ENCOUNTER — ANCILLARY PROCEDURE (OUTPATIENT)
Dept: MAMMOGRAPHY | Facility: CLINIC | Age: 68
End: 2023-07-12
Attending: NURSE PRACTITIONER
Payer: MEDICARE

## 2023-07-12 DIAGNOSIS — Z12.31 VISIT FOR SCREENING MAMMOGRAM: ICD-10-CM

## 2023-07-12 PROCEDURE — 77067 SCR MAMMO BI INCL CAD: CPT | Mod: TC | Performed by: STUDENT IN AN ORGANIZED HEALTH CARE EDUCATION/TRAINING PROGRAM

## 2023-08-28 DIAGNOSIS — E78.00 HYPERCHOLESTEREMIA: ICD-10-CM

## 2023-08-29 NOTE — TELEPHONE ENCOUNTER
"Routing refill request to provider for review/approval because:  Labs not current:  6/3/22  Last Written Prescription Date:  5/11/2022  Last Fill Quantity: 90,  # refills: 3   Last office visit provider:  6/26/2023     Requested Prescriptions   Pending Prescriptions Disp Refills    atorvastatin (LIPITOR) 20 MG tablet 90 tablet 3     Sig: Take 1 tablet (20 mg) by mouth daily       Statins Protocol Failed - 8/29/2023 12:08 PM        Failed - LDL on file in past 12 months     Recent Labs   Lab Test 06/03/22  0928                Passed - No abnormal creatine kinase in past 12 months     No lab results found.             Passed - Recent (12 mo) or future (30 days) visit within the authorizing provider's specialty     Patient has had an office visit with the authorizing provider or a provider within the authorizing providers department within the previous 12 mos or has a future within next 30 days. See \"Patient Info\" tab in inbasket, or \"Choose Columns\" in Meds & Orders section of the refill encounter.              Passed - Medication is active on med list        Passed - Patient is age 18 or older        Passed - No active pregnancy on record        Passed - No positive pregnancy test in past 12 months             Maria Teresa Garg RN 08/29/23 1:10 PM  "

## 2023-08-30 RX ORDER — ATORVASTATIN CALCIUM 20 MG/1
20 TABLET, FILM COATED ORAL DAILY
Qty: 90 TABLET | Refills: 3 | Status: SHIPPED | OUTPATIENT
Start: 2023-08-30 | End: 2024-07-18

## 2023-09-18 ENCOUNTER — ALLIED HEALTH/NURSE VISIT (OUTPATIENT)
Dept: EDUCATION SERVICES | Facility: CLINIC | Age: 68
End: 2023-09-18
Payer: MEDICARE

## 2023-09-18 VITALS — BODY MASS INDEX: 29.2 KG/M2 | WEIGHT: 200.6 LBS

## 2023-09-18 DIAGNOSIS — E11.9 DIABETES MELLITUS, TYPE 2 (H): Primary | ICD-10-CM

## 2023-09-18 PROCEDURE — G0108 DIAB MANAGE TRN  PER INDIV: HCPCS

## 2023-09-18 NOTE — LETTER
9/18/2023         RE: Dorita Stringer  [address]  [place] [address]        Dear Colleague,    Thank you for referring your patient, Dorita Stringer, to the [facility]. Please see a copy of my visit note below.    Diabetes Self-Management Education & Support    Presents for: Individual review    Type of Service: In Person Visit    Assessment Type:   ASSESSMENT:  Dorita is a very pleasant 67-year-old who returns to clinic today to review blood glucose, and assist her with any needs regarding her current diabetes self-management skills.  Her current A1c is meeting ADA goal of less than 7%.  She arrived today unaccompanied and had her logbook with her.  Dorita continues to check her blood glucose twice daily and diabetes is currently being managed successfully on diet and exercise alone.  My last visit with her in clinic was on 4/10/2023 and she reported today that over the summer she feels her allergies had been worse than normally they are.  She also has some concerns with her sleeping which we discussed and I informed her I would contact her PCP for her requesting a sleep study.  Prior to our visit today I did review her most recent office visit notes as well as lab results.  She is not yet due for an A1c so informed her I would place the order today and she would be able to make a lab appointment anytime after 9/28/2023 to have this done.  Informed her she need not be fasting.    Patient's most recent   Lab Results   Component Value Date    A1C 6.9 06/26/2023     is meeting goal of <7.0    Diabetes knowledge and skills assessment:   Patient is knowledgeable in diabetes management concepts related to: Healthy Eating, Being Active, Monitoring, Reducing Risks, and Healthy Coping    Continue education with the following diabetes management concepts: Being Active and Healthy Coping    Based on learning assessment above, most appropriate setting for further diabetes  education would be: Individual setting.      PLAN  1. Eat 3 balanced meals each day - Monitor carb intake and limit to 45-60 grams per meal  This would be equal to 3-4 choices ~  1 choice = 15 grams    Do not wait longer than 4-5 hours to eat something  Snacks limit to no more than 30 grams of carbohydrates or 2 choices  Make sure you include protein source with each meal and at bedtime - this has been shown to help with blood glucose elevations    2.  Check blood sugars once each day - please try and alternate the times you check between am fast ing( right after you wake before eating) and 2 hours AFTER dinner - this allows us to get a better idea of what is happening throughout your day , not at just one time     Fasting and before meal target is 80 - 130   2 hours after a meal target is < 180  remember to bring meter and log book to all appointments    3. Incorporate 30 minutes activity into each day - does not need to be all at one time & walking counts    Dorita reports she is interested in sleep study - informed her I would relate this to her provider  Topics to cover at upcoming visits: Healthy Eating, Being Active, Reducing Risks, and Healthy Coping    Follow-up: 4/24/24    See Care Plan for co-developed, patient-state behavior change goals.  AVS provided for patient today.    Education Materials Provided:  No new materials provided today      SUBJECTIVE/OBJECTIVE:  Presents for: Individual review  Accompanied by: Self  Diabetes education in the past 24 mo: Yes (LV 4/10/23)  Focus of Visit: Monitoring, Reducing Risks, Taking Medication, Healthy Coping, Healthy Eating, Assistance w/ making life changes, Self-care behavioral goal setting, Being Active  Diabetes type: Type 2  Date of diagnosis: 2022  Disease course: Improving  Transportation concerns: No  Difficulty affording diabetes medication?: Sometimes  Other concerns:: None (TBI in 2016)  Cultural Influences/Ethnic Background:  Not  or  "      Diabetes Symptoms & Complications:  Fatigue: Yes (unrelated to DM)  Symptom course: Stable  Weight trend: Stable  Complications assessed today?: No    Patient Problem List and Family Medical History reviewed for relevant medical history, current medical status, and diabetes risk factors.    Vitals:  Wt 91 kg (200 lb 9.6 oz)   BMI 29.20 kg/m    Estimated body mass index is 29.2 kg/m  as calculated from the following:    Height as of 6/26/23: 1.765 m (5' 9.5\").    Weight as of this encounter: 91 kg (200 lb 9.6 oz).   Last 3 BP:   BP Readings from Last 3 Encounters:   06/26/23 120/80   03/27/23 (!) 134/94   12/12/22 120/88       History   Smoking Status     Some Days     Packs/day: 0.10     Years: 20.00     Types: Cigarettes   Smokeless Tobacco     Never       Labs:  Lab Results   Component Value Date    A1C 6.9 06/26/2023     Lab Results   Component Value Date     06/26/2023     06/03/2022     Lab Results   Component Value Date    LDL  06/03/2022      Comment:      Cannot estimate LDL when triglyceride exceeds 400 mg/dL     06/03/2022     Direct Measure HDL   Date Value Ref Range Status   06/03/2022 46 (L) >=50 mg/dL Final     Comment:     HDL Cholesterol Reference Range:     0-2 years:   No reference ranges established for patients under 2 years old  at [facility] for lipid analytes.    2-8 years:  Greater than 45 mg/dL     18 years and older:   Female: Greater than or equal to 50 mg/dL   Male:   Greater than or equal to 40 mg/dL   ]  GFR Estimate   Date Value Ref Range Status   06/26/2023 74 >60 mL/min/1.73m2 Final   07/16/2020 >60 >60 mL/min/1.73m2 Final     GFR Estimate If Black   Date Value Ref Range Status   07/16/2020 >60 >60 mL/min/1.73m2 Final     Lab Results   Component Value Date    CR 0.85 06/26/2023     No results found for: MICROALBUMIN    Healthy Eating:  Healthy Eating Assessed Today: Yes  Cultural/Caodaism diet restrictions?: No  Meal planning/habits: " Smaller portions, Avoiding sweets  Meals include: Breakfast, Lunch, Dinner  Breakfast: 11-11:30 am : will have coffee with 1/2 and 1/2, yesterday was a slice of Quiche ( 20 CHO) a handful of red grapes  Lunch: 2 pm : if she goes out to eat , it is often to [employer] -her and her sister are now splitting a sandwich and having fruit or a cup of soup instead of French fries at home may have frozen or pre made  Dinner: 6-6:30 pm: Last night was a 4 0z hamburger eddie , 1/2 bun, 1/2 slice cheese and pickle - 1/3 c potato salad  Snacks: 430 to 5 PM might have a handful of nuts and a mini can of either ginger ale or Coke  7.5 oz.   at bedtime might have 2 tablespoons of cottage cheese or couple of cheese slices  Other: Tries to have at least 2 fresh vegetables at dinner-mostly will be asparagus or green beans-still is at times waking up in the middle of the night and will have a small glass of milk, juice, or soda-fortunately her blood glucose readings remain in target - working on cutting back intake of bread  Beverages: Water, Milk, Alcohol, Coffee, Juice, Soda (milk with Nestles Quik, soda is one small can gingerale, juice- cran or apple in am)  Has patient met with a dietitian in the past?: No    Being Active:  Being Active Assessed Today: Yes  Exercise:: Yes (Reported today she is going to work on getting back on a walking schedule-with goal being a minimum of 1 mile daily.  She also plans to resume water aerobics at the [facility])  Days per week of moderate to strenuous exercise (like a brisk walk): 3  On average, minutes per day of exercise at this level: 30  How intense was your typical exercise? : Light (like stretching or slow walking) (going to [facility] 2x/week - walks)  Exercise Minutes per Week: 90  Barrier to exercise: Safety, None (sometimes balance issues)    Monitoring:  Monitoring Assessed Today: Yes  Did patient bring glucose meter to appointment? : Yes (had logbook)  Blood Glucose Meter: Accu-chek  Times checking  blood sugar at home (number): 2  Times checking blood sugar at home (per): Day (AM fasting and again 2 hours after dinner)  Blood glucose trend: No change    The following are Dorita's most recent blood glucose readings that were taken from her logbook today-most recent are listed first.    FB, 156, 140, 163, 150, 164, 135, 148, 140, 161, 150, 150, 143, 156    1.5 to 2 hours post dinner: 217 (baked and ate cookies), 181, 189, 175, 171, 182, 176, 162, 135, 151, 156    These readings were reviewed and discussed with Dorita during her visit today.  Overall I informed her her readings looked acceptable.  Would really like to see her morning numbers come down some-emphasized the importance of protein at bedtime and trying to get a little exercise or activity in between dinner and bed if able.  She also has allergies to trees of all kinds and lately this has been worse than usual.  [place]    Taking Medications:      Taking Medication Assessed Today: Yes  Current Treatments: Diet  Problems taking diabetes medications regularly?: No  Diabetes medication side effects?:  (Stated today she feels like sometimes the metformin gives her an upset stomach, but this does not happen every day and it is not debilitating)    Problem Solving:  Problem Solving Assessed Today: Yes  Is the patient at risk for hypoglycemia?: No  Is the patient at risk for DKA?: No  Does patient have severe weather/disaster plan for diabetes management?: Not Needed  Does patient have sick day plan for diabetes management?: Not Needed              Reducing Risks:  Reducing Risks Assessed Today: Yes  Diabetes Risks: Age over 45 years, Sedentary Lifestyle, Family History  CAD Risks: Sedentary lifestyle, Post-menopausal, Hypertension, Diabetes Mellitus  Has dilated eye exam at least once a year?: Yes  Sees dentist every 6 months?: Yes  Feet checked by healthcare provider in the last year?:  Yes    Healthy Coping:  Healthy Coping Assessed Today: Yes  Emotional response to diabetes: Ready to learn  Informal Support system:: Family  Stage of change: ACTION (Actively working towards change)  Support resources: In-person Offerings  Patient Activation Measure Survey Score:       No data to display                  Care Plan and Education Provided:  Care Plan: Diabetes   Updates made by Sandra Peng RN since 9/19/2023 12:00 AM        Problem: HbA1C Not In Goal         Goal: Establish Regular Follow-Ups with PCP    Recent Progress: 100%   Note:    Follow-up appointments with both PCP and [facility] are scheduled.       Goal: Get HbA1C Level in Goal    Recent Progress: 100%   Note:    A1c from 6/26/23 returned at 6.9% which meets ADA goal       Problem: Diabetes Self-Management Education Needed to Optimize Self-Care Behaviors         Goal: Healthy Eating - follow a healthy eating pattern for diabetes    This Visit's Progress: 90%   Recent Progress: 80%   Note:    Current dietary intake was reviewed today-recommendations made-have her continue to work on making sure each meal is well-balanced -overall she is doing well.  Continues to work on less soda       Goal: Being Active - get regular physical activity, working up to at least 150 minutes per week    This Visit's Progress: 70%   Recent Progress: 30%   Note:    Has returned to [facility] 2x/week       Goal: Monitoring - monitor glucose and ketones as directed    This Visit's Progress: 100%   Recent Progress: 50%   Note:    Will decrease monitoring today to once daily       Goal: Reducing Risks - know how to prevent and treat long-term diabetes complications    This Visit's Progress: 100%   Recent Progress: 70%   Note:    Up-to-date on feet, eye, dental and A1c is  meeting ADA goal       Goal: Healthy Coping - use available resources to cope with the challenges of managing diabetes    This Visit's Progress: 100%   Recent Progress: 80%   Note:    Dorita is doing well  she is engaged with her management and continues to work towards better glycemic control and overall improved health.         Thank you,  Sandra Hill RN [facility]  Certified Diabetes Care and   Visit type : DSMT      Time Spent: 60 minutes  Encounter Type: Individual    Any diabetes medication dose changes were made via the CDE Protocol per the patient's referring provider and primary care provider. A copy of this encounter was shared with the provider.     Much or all of the text in this note was generated through the use of the Dragon Dictate voice-to-text software.Errors in spelling or words which seem out of context are unintentional. Sound alike errors, in particular, may have escaped editing.

## 2023-09-18 NOTE — PATIENT INSTRUCTIONS
1. Eat 3 balanced meals each day - Monitor carb intake and limit to 45-60 grams per meal  This would be equal to 3-4 choices ~  1 choice = 15 grams    Do not wait longer than 4-5 hours to eat something  Snacks limit to no more than 30 grams of carbohydrates or 2 choices  Make sure you include protein source with each meal and at bedtime - this has been shown to help with blood glucose elevations    2.  Check blood sugars once each day - please try and alternate the times you check between am fast ing( right after you wake before eating) and 2 hours AFTER dinner - this allows us to get a better idea of what is happening throughout your day , not at just one time     Fasting and before meal target is 80 - 130   2 hours after a meal target is < 180  remember to bring meter and log book to all appointments    3. Incorporate 30 minutes activity into each day - does not need to be all at one time & walking counts    Keep doing what your doing and call me or My Chart message if you need anything     Call and make lab appointment for A1c in October       Thank you for coming in to see me today !      I value your experience and would be very thankful for your time in providing feedback in our clinic survey.    You may receive an e-mail, text message or even something in the mail from [employer].  This is a survey to let us know how we are doing - the survey will be related to your diabetes education and me.

## 2023-09-19 NOTE — PROGRESS NOTES
Diabetes Self-Management Education & Support    Presents for: Individual review    Type of Service: In Person Visit    Assessment Type:   ASSESSMENT:  Dorita is a very pleasant 67-year-old who returns to clinic today to review blood glucose, and assist her with any needs regarding her current diabetes self-management skills.  Her current A1c is meeting ADA goal of less than 7%.  She arrived today unaccompanied and had her logbook with her.  Dorita continues to check her blood glucose twice daily and diabetes is currently being managed successfully on diet and exercise alone.  My last visit with her in clinic was on 4/10/2023 and she reported today that over the summer she feels her allergies had been worse than normally they are.  She also has some concerns with her sleeping which we discussed and I informed her I would contact her PCP for her requesting a sleep study.  Prior to our visit today I did review her most recent office visit notes as well as lab results.  She is not yet due for an A1c so informed her I would place the order today and she would be able to make a lab appointment anytime after 9/28/2023 to have this done.  Informed her she need not be fasting.    Patient's most recent   Lab Results   Component Value Date    A1C 6.9 06/26/2023     is meeting goal of <7.0    Diabetes knowledge and skills assessment:   Patient is knowledgeable in diabetes management concepts related to: Healthy Eating, Being Active, Monitoring, Reducing Risks, and Healthy Coping    Continue education with the following diabetes management concepts: Being Active and Healthy Coping    Based on learning assessment above, most appropriate setting for further diabetes education would be: Individual setting.      PLAN  1. Eat 3 balanced meals each day - Monitor carb intake and limit to 45-60 grams per meal  This would be equal to 3-4 choices ~  1 choice = 15 grams    Do not wait longer than 4-5 hours to eat something  Snacks limit to  no more than 30 grams of carbohydrates or 2 choices  Make sure you include protein source with each meal and at bedtime - this has been shown to help with blood glucose elevations    2.  Check blood sugars once each day - please try and alternate the times you check between am fast ing( right after you wake before eating) and 2 hours AFTER dinner - this allows us to get a better idea of what is happening throughout your day , not at just one time     Fasting and before meal target is 80 - 130   2 hours after a meal target is < 180  remember to bring meter and log book to all appointments    3. Incorporate 30 minutes activity into each day - does not need to be all at one time & walking counts    Dorita reports she is interested in sleep study - informed her I would relate this to her provider  Topics to cover at upcoming visits: Healthy Eating, Being Active, Reducing Risks, and Healthy Coping    Follow-up: 4/24/24    See Care Plan for co-developed, patient-state behavior change goals.  AVS provided for patient today.    Education Materials Provided:  No new materials provided today      SUBJECTIVE/OBJECTIVE:  Presents for: Individual review  Accompanied by: Self  Diabetes education in the past 24 mo: Yes (LV 4/10/23)  Focus of Visit: Monitoring, Reducing Risks, Taking Medication, Healthy Coping, Healthy Eating, Assistance w/ making life changes, Self-care behavioral goal setting, Being Active  Diabetes type: Type 2  Date of diagnosis: 2022  Disease course: Improving  Transportation concerns: No  Difficulty affording diabetes medication?: Sometimes  Other concerns:: None (TBI in 2016)  Cultural Influences/Ethnic Background:  Not  or       Diabetes Symptoms & Complications:  Fatigue: Yes (unrelated to DM)  Symptom course: Stable  Weight trend: Stable  Complications assessed today?: No    Patient Problem List and Family Medical History reviewed for relevant medical history, current medical status, and  "diabetes risk factors.    Vitals:  Wt 91 kg (200 lb 9.6 oz)   BMI 29.20 kg/m    Estimated body mass index is 29.2 kg/m  as calculated from the following:    Height as of 6/26/23: 1.765 m (5' 9.5\").    Weight as of this encounter: 91 kg (200 lb 9.6 oz).   Last 3 BP:   BP Readings from Last 3 Encounters:   06/26/23 120/80   03/27/23 (!) 134/94   12/12/22 120/88       History   Smoking Status    Some Days    Packs/day: 0.10    Years: 20.00    Types: Cigarettes   Smokeless Tobacco    Never       Labs:  Lab Results   Component Value Date    A1C 6.9 06/26/2023     Lab Results   Component Value Date     06/26/2023     06/03/2022     Lab Results   Component Value Date    LDL  06/03/2022      Comment:      Cannot estimate LDL when triglyceride exceeds 400 mg/dL     06/03/2022     Direct Measure HDL   Date Value Ref Range Status   06/03/2022 46 (L) >=50 mg/dL Final     Comment:     HDL Cholesterol Reference Range:     0-2 years:   No reference ranges established for patients under 2 years old  at [facility] for lipid analytes.    2-8 years:  Greater than 45 mg/dL     18 years and older:   Female: Greater than or equal to 50 mg/dL   Male:   Greater than or equal to 40 mg/dL   ]  GFR Estimate   Date Value Ref Range Status   06/26/2023 74 >60 mL/min/1.73m2 Final   07/16/2020 >60 >60 mL/min/1.73m2 Final     GFR Estimate If Black   Date Value Ref Range Status   07/16/2020 >60 >60 mL/min/1.73m2 Final     Lab Results   Component Value Date    CR 0.85 06/26/2023     No results found for: MICROALBUMIN    Healthy Eating:  Healthy Eating Assessed Today: Yes  Cultural/Anglican diet restrictions?: No  Meal planning/habits: Smaller portions, Avoiding sweets  Meals include: Breakfast, Lunch, Dinner  Breakfast: 11-11:30 am : will have coffee with 1/2 and 1/2, yesterday was a slice of Quiche ( 20 CHO) a handful of red grapes  Lunch: 2 pm : if she goes out to eat , it is often to [employer] -her and her " sister are now splitting a sandwich and having fruit or a cup of soup instead of French fries at home may have frozen or pre made  Dinner: 6-6:30 pm: Last night was a 4 0z hamburger eddie , 1/2 bun, 1/2 slice cheese and pickle - 1/3 c potato salad  Snacks: 430 to 5 PM might have a handful of nuts and a mini can of either ginger ale or Coke  7.5 oz.   at bedtime might have 2 tablespoons of cottage cheese or couple of cheese slices  Other: Tries to have at least 2 fresh vegetables at dinner-mostly will be asparagus or green beans-still is at times waking up in the middle of the night and will have a small glass of milk, juice, or soda-fortunately her blood glucose readings remain in target - working on cutting back intake of bread  Beverages: Water, Milk, Alcohol, Coffee, Juice, Soda (milk with Nestles Quik, soda is one small can gingerale, juice- cran or apple in am)  Has patient met with a dietitian in the past?: No    Being Active:  Being Active Assessed Today: Yes  Exercise:: Yes (Reported today she is going to work on getting back on a walking schedule-with goal being a minimum of 1 mile daily.  She also plans to resume water aerobics at the [facility])  Days per week of moderate to strenuous exercise (like a brisk walk): 3  On average, minutes per day of exercise at this level: 30  How intense was your typical exercise? : Light (like stretching or slow walking) (going to [facility] 2x/week - walks)  Exercise Minutes per Week: 90  Barrier to exercise: Safety, None (sometimes balance issues)    Monitoring:  Monitoring Assessed Today: Yes  Did patient bring glucose meter to appointment? : Yes (had logbook)  Blood Glucose Meter: Accu-chek  Times checking blood sugar at home (number): 2  Times checking blood sugar at home (per): Day (AM fasting and again 2 hours after dinner)  Blood glucose trend: No change    The following are Dorita's most recent blood glucose readings that were taken from her logbook today-most recent are listed  first.    FB, 156, 140, 163, 150, 164, 135, 148, 140, 161, 150, 150, 143, 156    1.5 to 2 hours post dinner: 217 (baked and ate cookies), 181, 189, 175, 171, 182, 176, 162, 135, 151, 156    These readings were reviewed and discussed with Dorita during her visit today.  Overall I informed her her readings looked acceptable.  Would really like to see her morning numbers come down some-emphasized the importance of protein at bedtime and trying to get a little exercise or activity in between dinner and bed if able.  She also has allergies to trees of all kinds and lately this has been worse than usual.  [place]    Taking Medications:      Taking Medication Assessed Today: Yes  Current Treatments: Diet  Problems taking diabetes medications regularly?: No  Diabetes medication side effects?:  (Stated today she feels like sometimes the metformin gives her an upset stomach, but this does not happen every day and it is not debilitating)    Problem Solving:  Problem Solving Assessed Today: Yes  Is the patient at risk for hypoglycemia?: No  Is the patient at risk for DKA?: No  Does patient have severe weather/disaster plan for diabetes management?: Not Needed  Does patient have sick day plan for diabetes management?: Not Needed              Reducing Risks:  Reducing Risks Assessed Today: Yes  Diabetes Risks: Age over 45 years, Sedentary Lifestyle, Family History  CAD Risks: Sedentary lifestyle, Post-menopausal, Hypertension, Diabetes Mellitus  Has dilated eye exam at least once a year?: Yes  Sees dentist every 6 months?: Yes  Feet checked by healthcare provider in the last year?: Yes    Healthy Coping:  Healthy Coping Assessed Today: Yes  Emotional response to diabetes: Ready to learn  Informal Support system:: Family  Stage of change: ACTION (Actively working towards change)  Support resources: In-person Offerings  Patient Activation Measure Survey Score:       No  data to display                  Care Plan and Education Provided:  Care Plan: Diabetes   Updates made by Sandra Peng RN since 9/19/2023 12:00 AM        Problem: HbA1C Not In Goal         Goal: Establish Regular Follow-Ups with PCP    Recent Progress: 100%   Note:    Follow-up appointments with both PCP and [facility] are scheduled.       Goal: Get HbA1C Level in Goal    Recent Progress: 100%   Note:    A1c from 6/26/23 returned at 6.9% which meets ADA goal       Problem: Diabetes Self-Management Education Needed to Optimize Self-Care Behaviors         Goal: Healthy Eating - follow a healthy eating pattern for diabetes    This Visit's Progress: 90%   Recent Progress: 80%   Note:    Current dietary intake was reviewed today-recommendations made-have her continue to work on making sure each meal is well-balanced -overall she is doing well.  Continues to work on less soda       Goal: Being Active - get regular physical activity, working up to at least 150 minutes per week    This Visit's Progress: 70%   Recent Progress: 30%   Note:    Has returned to [facility] 2x/week       Goal: Monitoring - monitor glucose and ketones as directed    This Visit's Progress: 100%   Recent Progress: 50%   Note:    Will decrease monitoring today to once daily       Goal: Reducing Risks - know how to prevent and treat long-term diabetes complications    This Visit's Progress: 100%   Recent Progress: 70%   Note:    Up-to-date on feet, eye, dental and A1c is  meeting ADA goal       Goal: Healthy Coping - use available resources to cope with the challenges of managing diabetes    This Visit's Progress: 100%   Recent Progress: 80%   Note:    Dorita is doing well she is engaged with her management and continues to work towards better glycemic control and overall improved health.         Thank you,  Sandra Peng RN [facility]  Certified Diabetes Care and   Visit type : DSMT      Time Spent: 60 minutes  Encounter Type: Individual    Any  diabetes medication dose changes were made via the CDE Protocol per the patient's referring provider and primary care provider. A copy of this encounter was shared with the provider.     Much or all of the text in this note was generated through the use of the Dragon Dictate voice-to-text software.Errors in spelling or words which seem out of context are unintentional. Sound alike errors, in particular, may have escaped editing.

## 2023-09-20 DIAGNOSIS — G47.19 EXCESSIVE DAYTIME SLEEPINESS: ICD-10-CM

## 2023-09-20 DIAGNOSIS — R06.83 SNORING: Primary | ICD-10-CM

## 2023-09-20 NOTE — PROGRESS NOTES
Patient reported that she has continued snoring, wakes up at night snoring, day time sleepiness. Sleep study ordered.

## 2023-10-15 ENCOUNTER — HEALTH MAINTENANCE LETTER (OUTPATIENT)
Age: 68
End: 2023-10-15

## 2023-11-05 DIAGNOSIS — I10 ESSENTIAL HYPERTENSION: ICD-10-CM

## 2023-11-06 RX ORDER — LOSARTAN POTASSIUM 100 MG/1
100 TABLET ORAL DAILY
Qty: 90 TABLET | Refills: 2 | Status: SHIPPED | OUTPATIENT
Start: 2023-11-06 | End: 2024-02-15

## 2023-11-24 ENCOUNTER — E-VISIT (OUTPATIENT)
Dept: INTERNAL MEDICINE | Facility: CLINIC | Age: 68
End: 2023-11-24
Payer: MEDICARE

## 2023-11-24 DIAGNOSIS — J01.90 ACUTE SINUSITIS WITH SYMPTOMS > 10 DAYS: Primary | ICD-10-CM

## 2023-11-24 PROCEDURE — 99421 OL DIG E/M SVC 5-10 MIN: CPT | Performed by: NURSE PRACTITIONER

## 2023-11-24 NOTE — PATIENT INSTRUCTIONS
Acute Sinusitis: Care Instructions  Overview     Acute sinusitis is an inflammation of the mucous membranes inside the nose and sinuses. Sinuses are the hollow spaces in your skull around the eyes and nose. Acute sinusitis often follows a cold. Acute sinusitis causes thick, discolored mucus that drains from the nose or down the back of the throat. It also can cause pain and pressure in your head and face along with a stuffy or blocked nose.  In most cases, sinusitis gets better on its own in 1 to 2 weeks. But some mild symptoms may last for several weeks. Sometimes antibiotics are needed if there is a bacterial infection.  Follow-up care is a key part of your treatment and safety. Be sure to make and go to all appointments, and call your doctor if you are having problems. It's also a good idea to know your test results and keep a list of the medicines you take.  How can you care for yourself at home?  Use saline (saltwater) nasal washes. This can help keep your nasal passages open and wash out mucus and allergens.  You can buy saline nose washes at a grocery store or drugstore. Follow the instructions on the package.  You can make your own at home. Add 1 teaspoon of non-iodized salt and 1 teaspoon of baking soda to 2 cups of distilled or boiled and cooled water. Fill a squeeze bottle or a nasal cleansing pot (such as a neti pot) with the nasal wash. Then put the tip into your nostril, and lean over the sink. With your mouth open, gently squirt the liquid. Repeat on the other side.  Try a decongestant nasal spray like oxymetazoline (Afrin). Do not use it for more than 3 days in a row. Using it for more than 3 days can make your congestion worse.  If needed, take an over-the-counter pain medicine, such as acetaminophen (Tylenol), ibuprofen (Advil, Motrin), or naproxen (Aleve). Read and follow all instructions on the label.  If the doctor prescribed antibiotics, take them as directed. Do not stop taking them just  "because you feel better. You need to take the full course of antibiotics.  Be careful when taking over-the-counter cold or flu medicines and Tylenol at the same time. Many of these medicines have acetaminophen, which is Tylenol. Read the labels to make sure that you are not taking more than the recommended dose. Too much acetaminophen (Tylenol) can be harmful.  Try a steroid nasal spray. It may help with your symptoms.  Breathe warm, moist air. You can use a steamy shower, a hot bath, or a sink filled with hot water. Avoid cold, dry air. Using a humidifier in your home may help. Follow the directions for cleaning the machine.  When should you call for help?   Call your doctor now or seek immediate medical care if:    You have new or worse swelling, redness, or pain in your face or around one or both of your eyes.     You have double vision or a change in your vision.     You have a high fever.     You have a severe headache and a stiff neck.     You have mental changes, such as feeling confused or much less alert.   Watch closely for changes in your health, and be sure to contact your doctor if:    You are not getting better as expected.   Where can you learn more?  Go to https://www.TapMyBack.net/patiented  Enter I933 in the search box to learn more about \"Acute Sinusitis: Care Instructions.\"  Current as of: February 28, 2023               Content Version: 13.8    7827-8623 [employer].   Care instructions adapted under license by your healthcare professional. If you have questions about a medical condition or this instruction, always ask your healthcare professional. [employer] disclaims any warranty or liability for your use of this information.      Dear Dorita Stringer    After reviewing your responses, I've been able to diagnose you with Acute sinusitis with symptoms > 10 days.      Based on your responses and diagnosis, I have prescribed   Orders Placed This Encounter "   Medications     amoxicillin-clavulanate (AUGMENTIN) 875-125 MG tablet     Sig: Take 1 tablet by mouth 2 times daily for 7 days     Dispense:  14 tablet     Refill:  0      to treat your symptoms. I have sent this to your pharmacy.?     It is also important to stay well hydrated, get lots of rest and take over-the-counter decongestants,?tylenol?or ibuprofen if you?are able to?take those medications per your primary care provider to help relieve discomfort.?     It is important that you take?all of?your prescribed medication even if your symptoms are improving after a few doses.? Taking?all of?your medicine helps prevent the symptoms from returning.?     If your symptoms worsen, you develop severe headache, vomiting, high fever (>102), or are not improving in 7 days, please contact your primary care provider for an appointment or visit any of our convenient Walk-in Care or Urgent Care Centers to be seen which can be found on our website?here.?     Thanks again for choosing?us?as your health care partner,?   ?  Stacey Aquino, LALA?

## 2023-12-02 DIAGNOSIS — E11.65 TYPE 2 DIABETES MELLITUS WITH HYPERGLYCEMIA, WITHOUT LONG-TERM CURRENT USE OF INSULIN (H): ICD-10-CM

## 2023-12-04 DIAGNOSIS — E11.65 TYPE 2 DIABETES MELLITUS WITH HYPERGLYCEMIA, WITHOUT LONG-TERM CURRENT USE OF INSULIN (H): ICD-10-CM

## 2023-12-04 RX ORDER — LANCETS
EACH MISCELLANEOUS
Qty: 100 EACH | Refills: 1 | Status: SHIPPED | OUTPATIENT
Start: 2023-12-04 | End: 2024-04-08

## 2023-12-04 RX ORDER — BLOOD SUGAR DIAGNOSTIC
STRIP MISCELLANEOUS
Qty: 100 STRIP | Refills: 3 | Status: SHIPPED | OUTPATIENT
Start: 2023-12-04 | End: 2024-04-08

## 2023-12-12 ENCOUNTER — TRANSFERRED RECORDS (OUTPATIENT)
Dept: HEALTH INFORMATION MANAGEMENT | Facility: CLINIC | Age: 68
End: 2023-12-12
Payer: MEDICARE

## 2023-12-12 LAB — RETINOPATHY: POSITIVE

## 2024-01-03 ENCOUNTER — OFFICE VISIT (OUTPATIENT)
Dept: INTERNAL MEDICINE | Facility: CLINIC | Age: 69
End: 2024-01-03
Payer: MEDICARE

## 2024-01-03 VITALS
RESPIRATION RATE: 16 BRPM | HEART RATE: 78 BPM | WEIGHT: 198 LBS | SYSTOLIC BLOOD PRESSURE: 130 MMHG | OXYGEN SATURATION: 98 % | HEIGHT: 70 IN | BODY MASS INDEX: 28.35 KG/M2 | TEMPERATURE: 98.1 F | DIASTOLIC BLOOD PRESSURE: 88 MMHG

## 2024-01-03 DIAGNOSIS — I10 ESSENTIAL HYPERTENSION: ICD-10-CM

## 2024-01-03 DIAGNOSIS — E11.65 TYPE 2 DIABETES MELLITUS WITH HYPERGLYCEMIA, WITHOUT LONG-TERM CURRENT USE OF INSULIN (H): Primary | ICD-10-CM

## 2024-01-03 DIAGNOSIS — R79.89 ELEVATED LIVER FUNCTION TESTS: ICD-10-CM

## 2024-01-03 LAB
ALBUMIN SERPL BCG-MCNC: 4.7 G/DL (ref 3.5–5.2)
ALP SERPL-CCNC: 136 U/L (ref 40–150)
ALT SERPL W P-5'-P-CCNC: 42 U/L (ref 0–50)
ANION GAP SERPL CALCULATED.3IONS-SCNC: 11 MMOL/L (ref 7–15)
AST SERPL W P-5'-P-CCNC: 36 U/L (ref 0–45)
BILIRUB SERPL-MCNC: 0.6 MG/DL
BUN SERPL-MCNC: 13.7 MG/DL (ref 8–23)
CALCIUM SERPL-MCNC: 9.9 MG/DL (ref 8.8–10.2)
CHLORIDE SERPL-SCNC: 100 MMOL/L (ref 98–107)
CREAT SERPL-MCNC: 0.86 MG/DL (ref 0.51–0.95)
DEPRECATED HCO3 PLAS-SCNC: 25 MMOL/L (ref 22–29)
EGFRCR SERPLBLD CKD-EPI 2021: 73 ML/MIN/1.73M2
GLUCOSE SERPL-MCNC: 182 MG/DL (ref 70–99)
HBA1C MFR BLD: 7.6 % (ref 0–5.6)
POTASSIUM SERPL-SCNC: 4.6 MMOL/L (ref 3.4–5.3)
PROT SERPL-MCNC: 7.9 G/DL (ref 6.4–8.3)
SODIUM SERPL-SCNC: 136 MMOL/L (ref 135–145)

## 2024-01-03 PROCEDURE — 36415 COLL VENOUS BLD VENIPUNCTURE: CPT | Performed by: NURSE PRACTITIONER

## 2024-01-03 PROCEDURE — 80053 COMPREHEN METABOLIC PANEL: CPT | Performed by: NURSE PRACTITIONER

## 2024-01-03 PROCEDURE — 83036 HEMOGLOBIN GLYCOSYLATED A1C: CPT | Performed by: NURSE PRACTITIONER

## 2024-01-03 PROCEDURE — 99214 OFFICE O/P EST MOD 30 MIN: CPT | Performed by: NURSE PRACTITIONER

## 2024-01-03 ASSESSMENT — PATIENT HEALTH QUESTIONNAIRE - PHQ9
10. IF YOU CHECKED OFF ANY PROBLEMS, HOW DIFFICULT HAVE THESE PROBLEMS MADE IT FOR YOU TO DO YOUR WORK, TAKE CARE OF THINGS AT HOME, OR GET ALONG WITH OTHER PEOPLE: NOT DIFFICULT AT ALL
SUM OF ALL RESPONSES TO PHQ QUESTIONS 1-9: 2
SUM OF ALL RESPONSES TO PHQ QUESTIONS 1-9: 2

## 2024-01-03 NOTE — PROGRESS NOTES
"  Assessment & Plan     Type 2 diabetes mellitus with hyperglycemia, without long-term current use of insulin (H): Diet controlled, will recheck her A1c.   - Comprehensive metabolic panel (BMP + Alb, Alk Phos, ALT, AST, Total. Bili, TP)  - Hemoglobin A1c    Essential hypertension: Blood pressure today was 130/88. She continues on Losartan. Stable.     Elevated liver function tests: AST, ALT, and alk phosphatase, will recheck. Asymptomatic.       BMI:   Estimated body mass index is 28.82 kg/m  as calculated from the following:    Height as of this encounter: 1.765 m (5' 9.5\").    Weight as of this encounter: 89.8 kg (198 lb).   Weight management plan: Discussed healthy diet and exercise guidelines      Stacey Aquino CNP  [facility]    Jonnathan Kevin is a 68 year old, presenting for the following health issues:  Follow Up        1/3/2024    12:37 PM   Additional Questions   Roomed by Flaquita LAINEZ The patient presents today for follow up of her diabetes.    She reports that the past couple of months have been difficult with the holidays, her sugars have been running between 120-150. She will get back on track in January with all of this.    She denies any other concerns, she overall is doing well.     Review of Systems   Constitutional, HEENT, cardiovascular, pulmonary, GI, , musculoskeletal, neuro, skin, endocrine and psych systems are negative, except as otherwise noted.      Objective    /88 (BP Location: Right arm, Patient Position: Sitting)   Pulse 78   Temp 98.1  F (36.7  C)   Resp 16   Ht 1.765 m (5' 9.5\")   Wt 89.8 kg (198 lb)   LMP  (LMP Unknown)   SpO2 98%   BMI 28.82 kg/m    Body mass index is 28.82 kg/m .  Physical Exam   GENERAL: healthy, alert and no distress  EYES: Eyes grossly normal to inspection  RESP: lungs clear to auscultation - no rales, rhonchi or wheezes  CV: regular rate and rhythm, normal S1 S2, no S3 or S4, no murmur, click or " rub, no peripheral edema  MS: no gross musculoskeletal defects noted  SKIN: no suspicious lesions or rashes  NEURO: Normal strength and tone, mentation intact and speech normal  PSYCH: mentation appears normal, affect normal/bright        Answers submitted by the patient for this visit:  Patient Health Questionnaire (Submitted on 1/3/2024)  If you checked off any problems, how difficult have these problems made it for you to do your work, take care of things at home, or get along with other people?: Not difficult at all  PHQ9 TOTAL SCORE: 2

## 2024-01-03 NOTE — PATIENT INSTRUCTIONS
Your labs are processing, I will release results on my chart once they are back.    Follow up at the [facility] to establish care, Dr. Javed Mcintyre is accepting patients there.

## 2024-01-04 ENCOUNTER — OFFICE VISIT (OUTPATIENT)
Dept: SLEEP MEDICINE | Facility: CLINIC | Age: 69
End: 2024-01-04
Attending: NURSE PRACTITIONER
Payer: MEDICARE

## 2024-01-04 VITALS
RESPIRATION RATE: 16 BRPM | WEIGHT: 198 LBS | OXYGEN SATURATION: 97 % | BODY MASS INDEX: 28.35 KG/M2 | DIASTOLIC BLOOD PRESSURE: 96 MMHG | HEART RATE: 80 BPM | SYSTOLIC BLOOD PRESSURE: 145 MMHG | HEIGHT: 70 IN

## 2024-01-04 DIAGNOSIS — G47.8 LONG SLEEPER: ICD-10-CM

## 2024-01-04 DIAGNOSIS — G47.30 OBSERVED SLEEP APNEA: Primary | ICD-10-CM

## 2024-01-04 DIAGNOSIS — R06.83 SNORING: ICD-10-CM

## 2024-01-04 DIAGNOSIS — Z91.89 RISK FACTORS FOR OBSTRUCTIVE SLEEP APNEA: ICD-10-CM

## 2024-01-04 PROCEDURE — 99204 OFFICE O/P NEW MOD 45 MIN: CPT | Performed by: INTERNAL MEDICINE

## 2024-01-04 ASSESSMENT — SLEEP AND FATIGUE QUESTIONNAIRES
HOW LIKELY ARE YOU TO NOD OFF OR FALL ASLEEP WHILE LYING DOWN TO REST IN THE AFTERNOON WHEN CIRCUMSTANCES PERMIT: MODERATE CHANCE OF DOZING
HOW LIKELY ARE YOU TO NOD OFF OR FALL ASLEEP IN A CAR, WHILE STOPPED FOR A FEW MINUTES IN TRAFFIC: WOULD NEVER DOZE
HOW LIKELY ARE YOU TO NOD OFF OR FALL ASLEEP WHILE WATCHING TV: WOULD NEVER DOZE
HOW LIKELY ARE YOU TO NOD OFF OR FALL ASLEEP WHEN YOU ARE A PASSENGER IN A CAR FOR AN HOUR WITHOUT A BREAK: WOULD NEVER DOZE
HOW LIKELY ARE YOU TO NOD OFF OR FALL ASLEEP WHILE SITTING QUIETLY AFTER LUNCH WITHOUT ALCOHOL: WOULD NEVER DOZE
HOW LIKELY ARE YOU TO NOD OFF OR FALL ASLEEP WHILE SITTING INACTIVE IN A PUBLIC PLACE: WOULD NEVER DOZE
HOW LIKELY ARE YOU TO NOD OFF OR FALL ASLEEP WHILE SITTING AND READING: WOULD NEVER DOZE
HOW LIKELY ARE YOU TO NOD OFF OR FALL ASLEEP WHILE SITTING AND TALKING TO SOMEONE: WOULD NEVER DOZE

## 2024-01-04 NOTE — PROGRESS NOTES
Chief complaint: Consultation requested by Didi Aquino CNP for evaluation of snoring, snort arousals, daytime sleepiness    Comprehensive sleep medicine questionnaire reviewed    History of Present Illness: 68-year-old female with history of hypertension and type 2 diabetes diagnosed in the last year.  She was on a trip this summer with family and was told that she was having observed apneas and gasping.  The breathing was really loud in between the gasps and apneas.  Patient has woken herself up on occasion with the snoring.  Her sleep is not otherwise routinely witnessed.  She usually starts sleeping on her sides but often will wake up on her back.  She often will have headaches in the morning and dry mouth.  She also has a lot of nasal drainage.  She recalls using Flonase in the past but is not sure why she stopped it.    She has a long history of high sleep requirement since childhood.  She tries to get 10 to 12 hours of sleep.  She suffered a traumatic brain injury about 6 years ago and for a few years after that she had a sleep requirement closer to 14 hours.  If she meets her sleep requirement she denies daytime sleepiness.  She typically will have a couple of cups of coffee.  She can drive long distances.  She does not take sleep aids.  Once she falls asleep she typically gets up 0-1 time at night to go to the bathroom.    Weight has been stable between 195 and 200 lbs for years.  There is no known family history of obstructive sleep apnea.    No known history of restless legs, sleepwalking, sleep talking or dream enactment behavior.    [place] Sleepiness Scale   Sitting and reading: Would never doze   Watching TV: Would never doze   Sitting, inactive in a public place (e.g. a theatre or a meeting): Would never doze   As a passenger in a car for an hour without a break: Would never doze   Lying down to rest in the afternoon when circumstances permit: Moderate chance of dozing   Sitting and talking to  someone: Would never doze   Sitting quietly after a lunch without alcohol: Would never doze   In a car, while stopped for a few minutes in traffic: Would never doze   Total score - [place]: 2   (Less than 10 normal)    Insomnia Severity Scale  KERRI Total Score: 5  Total score categories:  0-7 = No clinically significant insomnia   8-14 = Subthreshold insomnia   15-21 = Clinical insomnia (moderate severity)  22-28 = Clinical insomnia (severe)    STOP-BANG  Loud Snore   1  Excessively Tired/Sleepy   0  Observed apnea   1  Hypertension   1  BMI> 35 kg/m2   0  Age >50   1  Neck >16 in/40cm   0  Male Gender   0  Total =   4  (0-2 low, 3-4 intermediate, 5-8 high risk of KRISTIE)      Past Medical History:   Diagnosis Date    Acne     Allergic rhinitis     Gout     Hx of radiation therapy     Hyperlipemia     Idiopathic urticaria 10/31/2016    Irritable bowel syndrome     Malignant neoplasm of right breast (H) 8/25/2016    Migraine     Mild traumatic brain injury (H) 7/11/2017    Rosacea        Allergies   Allergen Reactions    Other Food Allergy Itching and Swelling     Hazulnut    Blueberry [Vaccinium Angustifolium] Other (See Comments)    Codeine Unknown    Metformin Headache and GI Disturbance       Current Outpatient Medications   Medication    ACCU-CHEK GUIDE test strip    allopurinol (ZYLOPRIM) 100 MG tablet    ascorbic acid, vitamin C, (VITAMIN C) 1000 MG tablet    atorvastatin (LIPITOR) 20 MG tablet    blood glucose monitoring (SOFTCLIX) lancets    calcium-vitamin D (CALCIUM-VITAMIN D) 500 mg(1,250mg) -200 unit per tablet    escitalopram (LEXAPRO) 10 MG tablet    losartan (COZAAR) 100 MG tablet    triamcinolone (KENALOG) 0.1 % external cream    vitamin D3 (CHOLECALCIFEROL) 25 mcg (1000 units) tablet     No current facility-administered medications for this visit.       Social History     Socioeconomic History    Marital status: Single     Spouse name: Not on file    Number of children: Not on file    Years of  education: Not on file    Highest education level: Not on file   Occupational History    Not on file   Tobacco Use    Smoking status: Some Days     Packs/day: 0.10     Years: 20.00     Additional pack years: 0.00     Total pack years: 2.00     Types: Cigarettes     Passive exposure: Never    Smokeless tobacco: Never   Vaping Use    Vaping Use: Never used   Substance and Sexual Activity    Alcohol use: Yes     Alcohol/week: 2.0 standard drinks of alcohol    Drug use: No    Sexual activity: Not Currently   Other Topics Concern    Not on file   Social History Narrative    She is single, lives alone, and does not have children.  She does not smoke cigarettes and occasionally has an alcoholic beverage. She has worked as independent  and part-time at [employer].  She became disabled in August 2016.     Social Determinants of Health     Financial Resource Strain: Low Risk  (1/3/2024)    Financial Resource Strain     Within the past 12 months, have you or your family members you live with been unable to get utilities (heat, electricity) when it was really needed?: No   Food Insecurity: Low Risk  (1/3/2024)    Food Insecurity     Within the past 12 months, did you worry that your food would run out before you got money to buy more?: No     Within the past 12 months, did the food you bought just not last and you didn t have money to get more?: No   Transportation Needs: Low Risk  (1/3/2024)    Transportation Needs     Within the past 12 months, has lack of transportation kept you from medical appointments, getting your medicines, non-medical meetings or appointments, work, or from getting things that you need?: No   Physical Activity: Not on file   Stress: Not on file   Social Connections: Not on file   Interpersonal Safety: Low Risk  (1/3/2024)    Interpersonal Safety     Do you feel physically and emotionally safe where you currently live?: Yes     Within the past 12 months, have you been hit, slapped,  "kicked or otherwise physically hurt by someone?: No     Within the past 12 months, have you been humiliated or emotionally abused in other ways by your partner or ex-partner?: No   Housing Stability: Low Risk  (1/3/2024)    Housing Stability     Do you have housing? : Yes     Are you worried about losing your housing?: No       Family History   Problem Relation Age of Onset    Breast Cancer Mother          age 46    Coronary Artery Disease Father          age 60    Multiple myeloma Brother         has amyloidosis as well    Cancer Brother         ? waldenstrom's    Kidney Disease Brother         had a transplant    Other - See Comments Sister         benign brain tumor    Bipolar Disorder Sister     Kidney Cancer Sister     Breast Cancer Maternal Aunt     Coronary Artery Disease Sister          age 74         EXAM:  BP (!) 140/96   Pulse 80   Resp 16   Ht 1.765 m (5' 9.5\")   Wt 89.8 kg (198 lb)   LMP  (LMP Unknown)   SpO2 97%   BMI 28.82 kg/m    GENERAL: Alert and no distress  EYES: Eyes grossly normal to inspection.  No obvious scleral/conjunctival abnormalities.  Oropharynx Mallampati 4 tonsillar stage I  RESP: Lungs are clear to auscultation bilaterally no rales or wheezes identified  Cardiac tones regular rate and rhythm S1-S2 normal  Extremities are perfused without edema  SKIN: Visible skin clear. No significant rash, abnormal pigmentation or lesions.  NEURO: Cranial nerves grossly intact.  Mentation and speech appropriate for age.  PSYCH: Mentation appears normal, affect normal, judgement and insight intact, normal speech and appearance well-groomed.         TSH   Date Value Ref Range Status   2021 2.55 0.30 - 5.00 uIU/mL Final       ASSESSMENT:  68-year-old female with hypertension, type 2 diabetes, high sleep requirement which has been longstanding, observed apnea and gasping.  Overall she is at at least intermediate risk for obstructive sleep apnea.  Identifying and treating " obstructive sleep apnea be medically indicated.    PLAN:  We reviewed the pathophysiology of obstructive sleep apnea, testing options, indications for treatment and treatment options.  Recommended home sleep apnea testing.  To be ideal in this situation so she could continue to with her long sleep requirement.  I will contacting her with results via [employer].  Will try to get this done before she leaves for [place].  However we may need to wait to initiate treatment until after she gets back.  See instructions for further details of counseling provided.  She is agreeable with this plan.    45 minutes spent by me on the date of the encounter doing chart review, history and exam, documentation and further activities per the note    Jazmine Hagan M.D.  Pulmonary/Critical Care/Sleep Medicine    [facility]   Floor 1, [address]   [address] [place] [address]. [place] [address]   Appointments: [phone]    The above note was dictated using voice recognition software and may include typographical errors. Please contact the author for any clarifications.

## 2024-01-04 NOTE — NURSING NOTE
Patient blood pressure high in clinic. Pt BP still high, medication will be taken at home. Schedule HST at  for 1/18/2024 with follow up via mychart. Printed AVS.     Sheridan Camacho   Clinic Assistant  [facility]

## 2024-01-04 NOTE — LETTER
1/4/2024         RE: Dorita Stringer  [address]  [place] [address]        Dear Colleague,    Thank you for referring your patient, Dorita Stringer, to the [facility] [place]. Please see a copy of my visit note below.    Chief complaint: Consultation requested by Didi Aquino CNP for evaluation of snoring, snort arousals, daytime sleepiness    Comprehensive sleep medicine questionnaire reviewed    History of Present Illness: 68-year-old female with history of hypertension and type 2 diabetes diagnosed in the last year.  She was on a trip this summer with family and was told that she was having observed apneas and gasping.  The breathing was really loud in between the gasps and apneas.  Patient has woken herself up on occasion with the snoring.  Her sleep is not otherwise routinely witnessed.  She usually starts sleeping on her sides but often will wake up on her back.  She often will have headaches in the morning and dry mouth.  She also has a lot of nasal drainage.  She recalls using Flonase in the past but is not sure why she stopped it.    She has a long history of high sleep requirement since childhood.  She tries to get 10 to 12 hours of sleep.  She suffered a traumatic brain injury about 6 years ago and for a few years after that she had a sleep requirement closer to 14 hours.  If she meets her sleep requirement she denies daytime sleepiness.  She typically will have a couple of cups of coffee.  She can drive long distances.  She does not take sleep aids.  Once she falls asleep she typically gets up 0-1 time at night to go to the bathroom.    Weight has been stable between 195 and 200 lbs for years.  There is no known family history of obstructive sleep apnea.    No known history of restless legs, sleepwalking, sleep talking or dream enactment behavior.    [place] Sleepiness Scale   Sitting and reading: Would never doze   Watching TV: Would never doze    Sitting, inactive in a public place (e.g. a theatre or a meeting): Would never doze   As a passenger in a car for an hour without a break: Would never doze   Lying down to rest in the afternoon when circumstances permit: Moderate chance of dozing   Sitting and talking to someone: Would never doze   Sitting quietly after a lunch without alcohol: Would never doze   In a car, while stopped for a few minutes in traffic: Would never doze   Total score - [place]: 2   (Less than 10 normal)    Insomnia Severity Scale  KERRI Total Score: 5  Total score categories:  0-7 = No clinically significant insomnia   8-14 = Subthreshold insomnia   15-21 = Clinical insomnia (moderate severity)  22-28 = Clinical insomnia (severe)    STOP-BANG  Loud Snore   1  Excessively Tired/Sleepy   0  Observed apnea   1  Hypertension   1  BMI> 35 kg/m2   0  Age >50   1  Neck >16 in/40cm   0  Male Gender   0  Total =   4  (0-2 low, 3-4 intermediate, 5-8 high risk of KRISTIE)      Past Medical History:   Diagnosis Date     Acne      Allergic rhinitis      Gout      Hx of radiation therapy      Hyperlipemia      Idiopathic urticaria 10/31/2016     Irritable bowel syndrome      Malignant neoplasm of right breast (H) 8/25/2016     Migraine      Mild traumatic brain injury (H) 7/11/2017     Rosacea        Allergies   Allergen Reactions     Other Food Allergy Itching and Swelling     Hazulnut     Blueberry [Vaccinium Angustifolium] Other (See Comments)     Codeine Unknown     Metformin Headache and GI Disturbance       Current Outpatient Medications   Medication     ACCU-CHEK GUIDE test strip     allopurinol (ZYLOPRIM) 100 MG tablet     ascorbic acid, vitamin C, (VITAMIN C) 1000 MG tablet     atorvastatin (LIPITOR) 20 MG tablet     blood glucose monitoring (SOFTCLIX) lancets     calcium-vitamin D (CALCIUM-VITAMIN D) 500 mg(1,250mg) -200 unit per tablet     escitalopram (LEXAPRO) 10 MG tablet     losartan (COZAAR) 100 MG tablet     triamcinolone (KENALOG) 0.1 %  external cream     vitamin D3 (CHOLECALCIFEROL) 25 mcg (1000 units) tablet     No current facility-administered medications for this visit.       Social History     Socioeconomic History     Marital status: Single     Spouse name: Not on file     Number of children: Not on file     Years of education: Not on file     Highest education level: Not on file   Occupational History     Not on file   Tobacco Use     Smoking status: Some Days     Packs/day: 0.10     Years: 20.00     Additional pack years: 0.00     Total pack years: 2.00     Types: Cigarettes     Passive exposure: Never     Smokeless tobacco: Never   Vaping Use     Vaping Use: Never used   Substance and Sexual Activity     Alcohol use: Yes     Alcohol/week: 2.0 standard drinks of alcohol     Drug use: No     Sexual activity: Not Currently   Other Topics Concern     Not on file   Social History Narrative    She is single, lives alone, and does not have children.  She does not smoke cigarettes and occasionally has an alcoholic beverage. She has worked as independent  and part-time at [employer].  She became disabled in August 2016.     Social Determinants of Health     Financial Resource Strain: Low Risk  (1/3/2024)    Financial Resource Strain      Within the past 12 months, have you or your family members you live with been unable to get utilities (heat, electricity) when it was really needed?: No   Food Insecurity: Low Risk  (1/3/2024)    Food Insecurity      Within the past 12 months, did you worry that your food would run out before you got money to buy more?: No      Within the past 12 months, did the food you bought just not last and you didn t have money to get more?: No   Transportation Needs: Low Risk  (1/3/2024)    Transportation Needs      Within the past 12 months, has lack of transportation kept you from medical appointments, getting your medicines, non-medical meetings or appointments, work, or from getting things that you  "need?: No   Physical Activity: Not on file   Stress: Not on file   Social Connections: Not on file   Interpersonal Safety: Low Risk  (1/3/2024)    Interpersonal Safety      Do you feel physically and emotionally safe where you currently live?: Yes      Within the past 12 months, have you been hit, slapped, kicked or otherwise physically hurt by someone?: No      Within the past 12 months, have you been humiliated or emotionally abused in other ways by your partner or ex-partner?: No   Housing Stability: Low Risk  (1/3/2024)    Housing Stability      Do you have housing? : Yes      Are you worried about losing your housing?: No       Family History   Problem Relation Age of Onset     Breast Cancer Mother          age 46     Coronary Artery Disease Father          age 60     Multiple myeloma Brother         has amyloidosis as well     Cancer Brother         ? waldenstrom's     Kidney Disease Brother         had a transplant     Other - See Comments Sister         benign brain tumor     Bipolar Disorder Sister      Kidney Cancer Sister      Breast Cancer Maternal Aunt      Coronary Artery Disease Sister          age 74         EXAM:  BP (!) 140/96   Pulse 80   Resp 16   Ht 1.765 m (5' 9.5\")   Wt 89.8 kg (198 lb)   LMP  (LMP Unknown)   SpO2 97%   BMI 28.82 kg/m    GENERAL: Alert and no distress  EYES: Eyes grossly normal to inspection.  No obvious scleral/conjunctival abnormalities.  Oropharynx Mallampati 4 tonsillar stage I  RESP: Lungs are clear to auscultation bilaterally no rales or wheezes identified  Cardiac tones regular rate and rhythm S1-S2 normal  Extremities are perfused without edema  SKIN: Visible skin clear. No significant rash, abnormal pigmentation or lesions.  NEURO: Cranial nerves grossly intact.  Mentation and speech appropriate for age.  PSYCH: Mentation appears normal, affect normal, judgement and insight intact, normal speech and appearance well-groomed.         TSH   Date Value " Ref Range Status   07/21/2021 2.55 0.30 - 5.00 uIU/mL Final       ASSESSMENT:  68-year-old female with hypertension, type 2 diabetes, high sleep requirement which has been longstanding, observed apnea and gasping.  Overall she is at at least intermediate risk for obstructive sleep apnea.  Identifying and treating obstructive sleep apnea be medically indicated.    PLAN:  We reviewed the pathophysiology of obstructive sleep apnea, testing options, indications for treatment and treatment options.  Recommended home sleep apnea testing.  To be ideal in this situation so she could continue to with her long sleep requirement.  I will contacting her with results via [employer].  Will try to get this done before she leaves for [place].  However we may need to wait to initiate treatment until after she gets back.  See instructions for further details of counseling provided.  She is agreeable with this plan.    45 minutes spent by me on the date of the encounter doing chart review, history and exam, documentation and further activities per the note    Jazmine Hagan M.D.  Pulmonary/Critical Care/Sleep Medicine    [facility]   Floor 1, [address]   [address] [place] [address]. [place] [address]   Appointments: [phone]    The above note was dictated using voice recognition software and may include typographical errors. Please contact the author for any clarifications.              Again, thank you for allowing me to participate in the care of your patient.        Sincerely,        Jazmine Hagan MD

## 2024-01-04 NOTE — PATIENT INSTRUCTIONS
"          MY TREATMENT INFORMATION FOR SLEEP APNEA-  Dorita Stringer    DOCTOR : Jazmine Hagan MD        Am I having a home sleep study?  --->Watch the video for the device you are using:    -/drop off device-   https://www.Aledia.com/watch?v=yGGFBdELGhk        Frequently asked questions:  1. What is Obstructive Sleep Apnea (KRISTIE)? KRISTIE is the most common type of sleep apnea. Apnea means, \"without breath.\"  Apnea is most often caused by narrowing or collapse of the upper airway as muscles relax during sleep.   Almost everyone has occasional apneas. Most people with sleep apnea have had brief interruptions at night frequently for many years.  The severity of sleep apnea is related to how frequent and severe the events are.   2. What are the consequences of KRISTIE? Symptoms include: feeling sleepy during the day, snoring loudly, gasping or stopping of breathing, trouble sleeping, and occasionally morning headaches or heartburn at night.  Sleepiness can be serious and even increase the risk of falling asleep while driving. Other health consequences may include development of high blood pressure and other cardiovascular disease in persons who are susceptible. Untreated KRISTIE  can contribute to heart disease, stroke and diabetes.   3. What are the treatment options? In most situations, sleep apnea is a lifelong disease that must be managed with daily therapy. Medications are not effective for sleep apnea and surgery is generally not considered until other therapies have been tried. Your treatment is your choice . Continuous Positive Airway (CPAP) works right away and is the therapy that is effective in nearly everyone. An oral device to hold your jaw forward is usually the next most reliable option. Other options include postioning devices (to keep you off your back), weight loss, and surgery including a tongue pacing device. There is more detail about some of these options below.  4. Are my sleep studies " covered by insurance? Although we will request verification of coverage, we advise you also check in advance of the study to ensure there is coverage.    Important tips for those choosing CPAP and similar devices  For new devices, sign up for device TASIA to monitor your device for your followup visits  We encourage you to utilize the [employer] tasia or website (myAir web (resmed.com) ) to monitor your therapy progress and share the data with your healthcare team when you discuss your sleep apnea.                                                    Know your equipment:  CPAP is continuous positive airway pressure that prevents obstructive sleep apnea by keeping the throat from collapsing while you are sleeping. In most cases, the device is  smart  and can slowly self-adjusts if your throat collapses and keeps a record every day of how well you are treated-this information is available to you and your care team.  BPAP is bilevel positive airway pressure that keeps your throat open and also assists each breath with a pressure boost to maintain adequate breathing.  Special kinds of BPAP are used in patients who have inadequate breathing from lung or heart disease. In most cases, the device is  smart  and can slowly self-adjusts to assist breathing. Like CPAP, the device keeps a record of how well you are treated.  Your mask is your connection to the device. You get to choose what feels most comfortable and the staff will help to make sure if fits. Here: are some examples of the different masks that are available: Magnetic mask aids may assist with use but there are safety issues that should be addressed when considering with magnets* ( see end of discussion).       Key points to remember on your journey with sleep apnea:  Sleep study.  PAP devices often need to be adjusted during a sleep study to show that they are effective and adjusted right.  Good tips to remember: Try wearing just the mask during a quiet time  during the day so your body adapts to wearing it. A humidifier is recommended for comfort in most cases to prevent drying of your nose and throat. Allergy medication from your provider may help you if you are having nasal congestion.  Getting settled-in. It takes more than one night for most of us to get used to wearing a mask. Try wearing just the mask during a quiet time during the day so your body adapts to wearing it. A humidifier is recommended for comfort in most cases. Our team will work with you carefully on the first day and will be in contact within 4 days and again at 2 and 4 weeks for advice and remote device adjustments. Your therapy is evaluated by the device each day.   Use it every night. The more you are able to sleep naturally for 7-8 hours, the more likely you will have good sleep and to prevent health risks or symptoms from sleep apnea. Even if you use it 4 hours it helps. Occasionally all of us are unable to use a medical therapy, in sleep apnea, it is not dangerous to miss one night.   Communicate. Call our skilled team on the number provided on the first day if your visit for problems that make it difficult to wear the device. Over 2 out of 3 patients can learn to wear the device long-term with help from our team. Remember to call our team or your sleep providers if you are unable to wear the device as we may have other solutions for those who cannot adapt to mask CPAP therapy. It is recommended that you sleep your sleep provider within the first 3 months and yearly after that if you are not having problems.   Use it for your health. We encourage use of CPAP masks during daytime quiet periods to allow your face and brain to adapt to the sensation of CPAP so that it will be a more natural sensation to awaken to at night or during naps. This can be very useful during the first few weeks or months of adapting to CPAP though it does not help medically to wear CPAP during wakefulness and  should  not be used as a strategy just to meet guidelines.  Take care of your equipment. Make sure you clean your mask and tubing using directions every day and that your filter and mask are replaced as recommended or if they are not working.     *Masks with magnets:  Updated Contraindications  Masks with magnetic components are contraindicated for use by patients where they, or anyone in close physical contact while using the mask, have the following:   Active medical implants that interact with magnets (i.e., pacemakers, implantable cardioverter defibrillators (ICD), neurostimulators, cerebrospinal fluid (CSF) shunts, insulin/infusion pumps)   Metallic implants/objects containing ferromagnetic material (i.e., aneurysm clips/flow disruption devices, embolic coils, stents, valves, electrodes, implants to restore hearing or balance with implanted magnets, ocular implants, metallic splinters in the eye)  Updated Warning  Keep the mask magnets at a safe distance of at least 6 inches (150 mm) away from implants or medical devices that may be adversely affected by magnetic interference. This warning applies to you or anyone in close physical contact with your mask. The magnets are in the frame and lower headgear clips, with a magnetic field strength of up to 400mT. When worn, they connect to secure the mask but may inadvertently detach while asleep.  Implants/medical devices, including those listed within contraindications, may be adversely affected if they change function under external magnetic fields or contain ferromagnetic materials that attract/repel to magnetic fields (some metallic implants, e.g., contact lenses with metal, dental implants, metallic cranial plates, screws, tereza hole covers, and bone substitute devices). Consult your physician and  of your implant / other medical device for information on the potential adverse effects of magnetic fields.    BESIDES CPAP, WHAT OTHER THERAPIES ARE  THERE?    Positioning Device  Positioning devices are generally used when sleep apnea is mild and only occurs on your back.This example shows a pillow that straps around the waist. It may be appropriate for those whose sleep study shows milder sleep apnea that occurs primarily when lying flat on one's back. Preliminary studies have shown benefit but effectiveness at home may need to be verified by a home sleep test. These devices are generally not covered by medical insurance.  Examples of devices that maintain sleeping on the back to prevent snoring and mild sleep apnea.    Belt type body positioner  http://[employer]/    Electronic reminder  http://nightshifttherapy.com/            Oral Appliance  What is oral appliance therapy?  An oral appliance device fits on your teeth at night like a retainer used after having braces. The device is made by a specialized dentist and requires several visits over 1-2 months before a manufactured device is made to fit your teeth and is adjusted to prevent your sleep apnea. Once an oral device is working properly, snoring should be improved. A home sleep test may be recommended at that time if to determine whether the sleep apnea is adequately treated.       Some things to remember:  -Oral devices are often, but not always, covered by your medical insurance. Be sure to check with your insurance provider.   -If you are referred for oral therapy, you will be given a list of specialized dentists to consider or you may choose to visit the Web site of the American Academy of Dental Sleep Medicine  -Oral devices are less likely to work if you have severe sleep apnea or are extremely overweight.     More detailed information  An oral appliance is a small acrylic device that fits over the upper and lower teeth  (similar to a retainer or a mouth guard). This device slightly moves jaw forward, which moves the base of the tongue forward, opens the airway, improves breathing for effective  treat snoring and obstructive sleep apnea in perhaps 7 out of 10 people .  The best working devices are custom-made by a dental device  after a mold is made of the teeth 1, 2, 3.  When is an oral appliance indicated?  Oral appliance therapy is recommended as a first-line treatment for patients with primary snoring, mild sleep apnea, and for patients with moderate sleep apnea who prefer appliance therapy to use of CPAP4, 5. Severity of sleep apnea is determined by sleep testing and is based on the number of respiratory events per hour of sleep.   How successful is oral appliance therapy?  The success rate of oral appliance therapy in patients with mild sleep apnea is 75-80% while in patients with moderate sleep apnea it is 50-70%. The chance of success in patients with severe sleep apnea is 40-50%. The research also shows that oral appliances have a beneficial effect on the cardiovascular health of KRISTIE patients at the same magnitude as CPAP therapy7.  Oral appliances should be a second-line treatment in cases of severe sleep apnea, but if not completely successful then a combination therapy utilizing CPAP plus oral appliance therapy may be effective. Oral appliances tend to be effective in a broad range of patients although studies show that the patients who have the highest success are females, younger patients, those with milder disease, and less severe obesity. 3, 6.   Finding a dentist that practices dental sleep medicine  Specific training is available through the American Academy of Dental Sleep Medicine for dentists interested in working in the field of sleep. To find a dentist who is educated in the field of sleep and the use of oral appliances, near you, visit the Web site of the American Academy of Dental Sleep Medicine.    References  1. Yoandy et al. Objectively measured vs self-reported compliance during oral appliance therapy for sleep-disordered breathing. Chest 2013; 144(5):  8890-3937.  2. Francis et al. Objective measurement of compliance during oral appliance therapy for sleep-disordered breathing. Thorax 2013; 68(1): 91-96.  3. Abril et al. Mandibular advancement devices in 620 men and women with KRISTIE and snoring: tolerability and predictors of treatment success. Chest 2004; 125: 8822-0162.  4. Ryan, et al. Oral appliances for snoring and KRISTIE: a review. Sleep 2006; 29: 244-262.  5. Vane et al. Oral appliance treatment for KRISTIE: an update. J Clin Sleep Med 2014; 10(2): 215-227.  6. Rohith et al. Predictors of OSAH treatment outcome. J Dent Res 2007; 86: 5765-9771.      Weight Loss:    Weight loss is a long-term strategy that may improve sleep apnea in some patients.    Weight management is a personal decision and the decision should be based on your interest and the potential benefits.  If you are interested in exploring weight loss strategies, the following discussion covers the impact on weight loss on sleep apnea and the approaches that may be successful.    Being overweight does not necessarily mean you will have health consequences.  Those who have BMI over 35 or over 27 with existing medical conditions carries greater risk.   Weight loss decreases severity of sleep apnea in most people with obesity. For those with mild obesity who have developed snoring with weight gain, even 15-30 pound weight loss can improve and occasionally eliminate sleep apnea.  Structured and life-long dietary and health habits are necessary to lose weight and keep healthier weight levels.     Though there may be significant health benefits from weight loss, long-term weight loss is very difficult to achieve- studies show success with dietary management in less than 10% of people. In addition, substantial weight loss may require years of dietary control and may be difficult if patients have severe obesity. In these cases, surgical management may be considered.  Finally, older  individuals who have tolerated obesity without health complications may be less likely to benefit from weight loss strategies.          Surgery:    Surgery for obstructive sleep apnea is considered generally only when other therapies fail to work. Surgery may be discussed with you if you are having a difficult time tolerating CPAP and or when there is an abnormal structure that requires surgical correction.  Nose and throat surgeries often enlarge the airway to prevent collapse.  Most of these surgeries create pain for 1-2 weeks and up to half of the most common surgeries are not effective throughout life.  You should carefully discuss the benefits and drawbacks to surgery with your sleep provider and surgeon to determine if it is the best solution for you.   More information  Surgery for KRISTIE is directed at areas that are responsible for narrowing or complete obstruction of the airway during sleep.  There are a wide range of procedures available to enlarge and/or stabilize the airway to prevent blockage of breathing in the three major areas where it can occur: the palate, tongue, and nasal regions.  Successful surgical treatment depends on the accurate identification of the factors responsible for obstructive sleep apnea in each person.  A personalized approach is required because there is no single treatment that works well for everyone.  Because of anatomic variation, consultation with an examination by a sleep surgeon is a critical first step in determining what surgical options are best for each patient.  In some cases, examination during sedation may be recommended in order to guide the selection of procedures.  Patients will be counseled about risks and benefits as well as the typical recovery course after surgery. Surgery is typically not a cure for a person s KRISTIE.  However, surgery will often significantly improve one s KRISTIE severity (termed  success rate ).  Even in the absence of a cure, surgery will  decrease the cardiovascular risk associated with OSA7; improve overall quality of life8 (sleepiness, functionality, sleep quality, etc).      Palate Procedures:  Patients with KRISTIE often have narrowing of their airway in the region of their tonsils and uvula.  The goals of palate procedures are to widen the airway in this region as well as to help the tissues resist collapse.  Modern palate procedure techniques focus on tissue conservation and soft tissue rearrangement, rather than tissue removal.  Often the uvula is preserved in this procedure. Residual sleep apnea is common in patient after pharyngoplasty with an average reduction in sleep apnea events of 33%2.      Tongue Procedures:  ExamWhile patients are awake, the muscles that surround the throat are active and keep this region open for breathing. These muscles relax during sleep, allowing the tongue and other structures to collapse and block breathing.  There are several different tongue procedures available.  Selection of a tongue base procedure depends on characteristics seen on physical exam.  Generally, procedures are aimed at removing bulky tissues in this area or preventing the back of the tongue from falling back during sleep.  Success rates for tongue surgery range from 50-62%3.    Hypoglossal Nerve Stimulation:  Hypoglossal nerve stimulation has recently received approval from the [place] Food and Drug Administration for the treatment of obstructive sleep apnea.  This is based on research showing that the system was safe and effective in treating sleep apnea6.  Results showed that the median AHI score decreased 68%, from 29.3 to 9.0. This therapy uses an implant system that senses breathing patterns and delivers mild stimulation to airway muscles, which keeps the airway open during sleep.  The system consists of three fully implanted components: a small generator (similar in size to a pacemaker), a breathing sensor, and a stimulation lead.   Using a small handheld remote, a patient turns the therapy on before bed and off upon awakening.    Candidates for this device must be greater than 18 years of age, have moderate to severe KRISTIE (AHI between 15-65), BMI less than 35, have tried CPAP/oral appliance for at least 8 weeks without success, and have appropriate upper airway anatomy (determined by a sleep endoscopy performed by Dr. Garry Baez).    Hypoglossal Nerve Stimulation Pathway:    The sleep surgeon s office will work with the patient through the insurance prior-authorization process (including communications and appeals).    Nasal Procedures:  Nasal obstruction can interfere with nasal breathing during the day and night.  Studies have shown that relief of nasal obstruction can improve the ability of some patients to tolerate positive airway pressure therapy for obstructive sleep apnea1.  Treatment options include medications such as nasal saline, topical corticosteroid and antihistamine sprays, and oral medications such as antihistamines or decongestants. Non-surgical treatments can include external nasal dilators for selected patients. If these are not successful by themselves, surgery can improve the nasal airway either alone or in combination with these other options.      Combination Procedures:  Combination of surgical procedures and other treatments may be recommended, particularly if patients have more than one area of narrowing or persistent positional disease.  The success rate of combination surgery ranges from 66-80%2,3.    References  Jose JI. The Role of the Nose in Snoring and Obstructive Sleep Apnoea: An Update.  Eur Arch Otorhinolaryngol. 2011; 268: 1365-73.   Martha SM; Flash JA; Skyla JR; Pallanch JF; Moraima MB; Raman SG; Ana LEON. Surgical modifications of the upper airway for obstructive sleep apnea in adults: a systematic review and meta-analysis. SLEEP 2010;33(10):9724-6027. She GONZALEZ. Hypopharyngeal surgery in  obstructive sleep apnea: an evidence-based medicine review.  Arch Otolaryngol Head Neck Surg. 2006 Feb;132(2):206-13.  Tom YH1, Randi Y, Landon ANGEL. The efficacy of anatomically based multilevel surgery for obstructive sleep apnea. Otolaryngol Head Neck Surg. 2003 Oct;129(4):327-35.  She GONZALEZ, Goldberg A. Hypopharyngeal Surgery in Obstructive Sleep Apnea: An Evidence-Based Medicine Review. Arch Otolaryngol Head Neck Surg. 2006 Feb;132(2):206-13.  Ascencion ESCAMILLA et al. Upper-Airway Stimulation for Obstructive Sleep Apnea.  N Engl J Med. 2014 Jan 9;370(2):139-49.  Stuart Y et al. Increased Incidence of Cardiovascular Disease in Middle-aged Men with Obstructive Sleep Apnea. Am J Respir Crit Care Med; 2002 166: 159-165  Dyerwes ESTRADA et al. Studying Life Effects and Effectiveness of Palatopharyngoplasty (SLEEP) study: Subjective Outcomes of Isolated Uvulopalatopharyngoplasty. Otolaryngol Head Neck Surg. 2011; 144: 623-631.        WHAT IF I ONLY HAVE SNORING?    Mandibular advancement devices, lateral sleep positioning, long-term weight loss and treatment of nasal allergies have been shown to improve snoring.  Exercising tongue muscles with a game (https://BiteHunter.ProFundCom/us/tasia/soundly-reduce-snoring/mx9152489204) or stimulating the tongue during the day with a device (https://doi.org/10.3390/wah88617511) have improved snoring in some individuals.    Remember to Drive Safe... Drive Alive     Sleep health profoundly affects your health, mood, and your safety.  Thirty three percent of the population (one in three of us) is not getting enough sleep and many have a sleep disorder. Not getting enough sleep or having an untreated / undertreated sleep condition may make us sleepy without even knowing it. In fact, our driving could be dramatically impaired due to our sleep health. As your provider, here are some things I would like you to know about driving:     Here are some warning signs for impairment and dangerous drowsy driving:               -Having been awake more than 16 hours               -Looking tired               -Eyelid drooping              -Head nodding (it could be too late at this point)              -Driving for more than 30 minutes     Some things you could do to make the driving safer if you are experiencing some drowsiness:              -Stop driving and rest              -Call for transportation              -Make sure your sleep disorder is adequately treated     Some things that have been shown NOT to work when experiencing drowsiness while driving:              -Turning on the radio              -Opening windows              -Eating any  distracting  /  entertaining  foods (e.g., sunflower seeds, candy, or any other)              -Talking on the phone      Your decision may not only impact your life, but also the life of others. Please, remember to drive safe for yourself and all of us.

## 2024-01-18 ENCOUNTER — OFFICE VISIT (OUTPATIENT)
Dept: SLEEP MEDICINE | Facility: CLINIC | Age: 69
End: 2024-01-18
Payer: MEDICARE

## 2024-01-18 DIAGNOSIS — G47.8 LONG SLEEPER: ICD-10-CM

## 2024-01-18 DIAGNOSIS — Z91.89 RISK FACTORS FOR OBSTRUCTIVE SLEEP APNEA: ICD-10-CM

## 2024-01-18 DIAGNOSIS — G47.30 OBSERVED SLEEP APNEA: ICD-10-CM

## 2024-01-18 DIAGNOSIS — R06.83 SNORING: ICD-10-CM

## 2024-01-18 ASSESSMENT — SLEEP AND FATIGUE QUESTIONNAIRES
HOW LIKELY ARE YOU TO NOD OFF OR FALL ASLEEP WHILE SITTING AND READING: WOULD NEVER DOZE
HOW LIKELY ARE YOU TO NOD OFF OR FALL ASLEEP WHILE WATCHING TV: SLIGHT CHANCE OF DOZING
HOW LIKELY ARE YOU TO NOD OFF OR FALL ASLEEP WHEN YOU ARE A PASSENGER IN A CAR FOR AN HOUR WITHOUT A BREAK: WOULD NEVER DOZE
HOW LIKELY ARE YOU TO NOD OFF OR FALL ASLEEP WHILE LYING DOWN TO REST IN THE AFTERNOON WHEN CIRCUMSTANCES PERMIT: HIGH CHANCE OF DOZING
HOW LIKELY ARE YOU TO NOD OFF OR FALL ASLEEP IN A CAR, WHILE STOPPED FOR A FEW MINUTES IN TRAFFIC: WOULD NEVER DOZE
HOW LIKELY ARE YOU TO NOD OFF OR FALL ASLEEP WHILE SITTING INACTIVE IN A PUBLIC PLACE: WOULD NEVER DOZE
HOW LIKELY ARE YOU TO NOD OFF OR FALL ASLEEP WHILE SITTING AND TALKING TO SOMEONE: WOULD NEVER DOZE
HOW LIKELY ARE YOU TO NOD OFF OR FALL ASLEEP WHILE SITTING QUIETLY AFTER LUNCH WITHOUT ALCOHOL: WOULD NEVER DOZE

## 2024-01-18 NOTE — PROGRESS NOTES
Pt is completing a home sleep test. Pt was instructed on how to put on the Noxturnal T3 device and associated equipment before going to bed and given the opportunity to practice putting it on before leaving the sleep center. Pt was reminded to bring the home sleep test kit back to the center tomorrow, at agreed upon time for download and reporting.   Neck circumference: 36 CM / 14 inches.  Srutih Cuevas CMA, HST Specialist  [place] [facility]

## 2024-01-19 ENCOUNTER — DOCUMENTATION ONLY (OUTPATIENT)
Dept: SLEEP MEDICINE | Facility: CLINIC | Age: 69
End: 2024-01-19
Payer: MEDICARE

## 2024-01-19 NOTE — PROGRESS NOTES
Patient did not complete post questionnaire.   MORENO Falcon, Clinical Specialist - Magdalena Berrios   No

## 2024-01-25 NOTE — PROGRESS NOTES
The HST was returned but did not record properly due to only 1 hour 41 minutes of usable data. Oximeter was off all night.  Patient will need to be called to the repeat test. Staff was notified test is a redo and charges were deleted.

## 2024-01-29 ENCOUNTER — TELEPHONE (OUTPATIENT)
Dept: SLEEP MEDICINE | Facility: CLINIC | Age: 69
End: 2024-01-29
Payer: MEDICARE

## 2024-01-29 NOTE — TELEPHONE ENCOUNTER
Left message to call on 1/29/2024  at 3:13 PM and informed pt to call back to  [phone] and ask for Sruthi with [facility] in [place]. Patient needs to redo sleep study. Only had 1 hour 14 minutes of time with steven recording.    Sruthi Cuevas CMA, HST Specialist  [place] [facility]

## 2024-01-30 NOTE — TELEPHONE ENCOUNTER
Spoke to the patient. She will come to [facility] as it is much closer for her. Appointment made for tomorrow for a  only.    Sruthi Cuevas CMA, HST Specialist  [place] [facility]

## 2024-01-31 ENCOUNTER — OFFICE VISIT (OUTPATIENT)
Dept: SLEEP MEDICINE | Facility: CLINIC | Age: 69
End: 2024-01-31
Payer: MEDICARE

## 2024-01-31 DIAGNOSIS — G47.8 LONG SLEEPER: ICD-10-CM

## 2024-01-31 DIAGNOSIS — G47.30 OBSERVED SLEEP APNEA: ICD-10-CM

## 2024-01-31 DIAGNOSIS — R06.83 SNORING: Primary | ICD-10-CM

## 2024-01-31 DIAGNOSIS — Z91.89 RISK FACTORS FOR OBSTRUCTIVE SLEEP APNEA: ICD-10-CM

## 2024-01-31 PROCEDURE — G0399 HOME SLEEP TEST/TYPE 3 PORTA: HCPCS | Performed by: INTERNAL MEDICINE

## 2024-01-31 NOTE — PROGRESS NOTES
Pt is completing a home sleep test. This is a redo HST. Pt was reminded to bring the home sleep test kit back to the center tomorrow, at agreed upon time for download and reporting.   Sruthi Cuevas [facility], HST Specialist  [place] [facility]

## 2024-02-01 ENCOUNTER — DOCUMENTATION ONLY (OUTPATIENT)
Dept: SLEEP MEDICINE | Facility: CLINIC | Age: 69
End: 2024-02-01
Payer: MEDICARE

## 2024-02-01 PROBLEM — G47.33 OSA (OBSTRUCTIVE SLEEP APNEA): Status: ACTIVE | Noted: 2024-02-01

## 2024-02-01 NOTE — PROGRESS NOTES
HST POST-STUDY QUESTIONNAIRE    What time did you go to bed?  10:15  How long do you think it took to fall asleep?  10 min  What time did you wake up to start the day?  5:30  Did you get up during the night at all?  yes  If you woke up, do you remember approximately what time(s)? Woke up at 1:25  Did you have any difficulty with the equipment?  No  Did you us any type of treatment with this study?  None  Was the head of the bed elevated? No  Did you sleep in a recliner?  No  Did you stop using CPAP at least 3 days before this test?  NA  Any other information you'd like us to know? no

## 2024-02-01 NOTE — PROGRESS NOTES
This HSAT was performed using a Noxturnal T3 device which recorded snore, sound, movement activity, body position, nasal pressure, oronasal thermal airflow, pulse, oximetry and both chest and abdominal respiratory effort. HSAT data was restricted to the time patient states they were in bed.     HSAT was scored using 1B 4% hypopnea rule.     HST AHI (Non-PAT): 33  Snoring was reported as loud.  Time with SpO2 below 89% was 121.3 minutes.   Overall signal quality was good     Pt will follow up with sleep provider to determine appropriate therapy.

## 2024-02-01 NOTE — PROCEDURES
"HOME SLEEP STUDY INTERPRETATION        Patient: Dorita Stringer  MRN: [id number]  YOB: 1955  Study Date: 2024  PCP/Referring Provider: Stacey Aquino; CNP  Ordering Provider:   Jazmine Hagan MD         Indications for Home Study: Dorita Stringer is a 68 year old female with a history of hypertension, type 2 diabetes, traumatic brain injury who presents with symptoms suggestive of obstructive sleep apnea.    Estimated body mass index is 28.82 kg/m  as calculated from the following:    Height as of 24: 1.765 m (5' 9.5\").    Weight as of 24: 89.8 kg (198 lb).  Total score - [place]: 4 (2024 11:46 AM)  STOP-BAN/8        Data: A full night home sleep study was performed recording the standard physiologic parameters including body position, movement, sound, nasal pressure, thermal oral airflow, chest and abdominal movements with respiratory inductance plethysmography, and oxygen saturation by pulse oximetry. Pulse rate was estimated by oximetry recording. This study was considered adequate based on > 4 hours of quality oximetry and respiratory recording. As specified by the AASM Manual for the Scoring of Sleep and Associated events, version 2.3, Rule VIII.D 1B, 4% oxygen desaturation scoring for hypopneas is used as a standard of care on all home sleep apnea testing.        Analysis Time:  470 minutes        Respiration:   Sleep Associated Hypoxemia: sustained hypoxemia was present. Baseline oxygen saturation was 92%.  Time with saturation less than or equal to 88% was 46.2 minutes. The lowest oxygen saturation was 80%.   Snoring: Snoring was present (42% at average 76 dB.)  Respiratory events: The home study revealed a presence of 13 obstructive apneas and 61 mixed and central apneas. There were 185 hypopneas resulting in a combined apnea/hypopnea index [AHI] of 33 events per hour.  AHI was 29.1 per hour supine, 24 per hour prone, 52.4 per hour on left side, and 29.5 per " hour on right side.   Pattern: Excluding events noted above, respiratory rate and pattern was Normal.      Position: Percent of time spent: supine - 23%, prone - 4%, on left - 17%, on right - 57%.      Heart Rate: By pulse oximetry normal rate was noted.       Assessment:   Severe obstructive sleep apnea with AHI 33 events per hour.  Sleep associated hypoxemia was present.    Recommendations:  Consider auto-CPAP at 5-15 cmH2O or polysomnography with full night PAP titration.  Suggest optimizing sleep hygiene and avoiding sleep deprivation.  Weight management.        Diagnosis Code(s): Obstructive Sleep Apnea G47.33, Hypoxemia G47.36, Snoring R06.83    Electronically signed by: Jazmine Hagan MD, February 1, 2024   Diplomate, American Board of Internal Medicine, Sleep Medicine

## 2024-02-05 ENCOUNTER — TELEPHONE (OUTPATIENT)
Dept: SLEEP MEDICINE | Facility: CLINIC | Age: 69
End: 2024-02-05
Payer: MEDICARE

## 2024-02-05 NOTE — TELEPHONE ENCOUNTER
Left message to inform patient that since she had an HST through [facility], [facility] will not be able to service her because Medicare considers that a conflict of interest. Advised patient to find a DME provider and call us back to have records faxed to the provider of her choice.

## 2024-02-07 ENCOUNTER — TELEPHONE (OUTPATIENT)
Dept: SLEEP MEDICINE | Facility: CLINIC | Age: 69
End: 2024-02-07
Payer: MEDICARE

## 2024-02-07 NOTE — TELEPHONE ENCOUNTER
Pt got a call from home medical that they could not do the CPAP machine as medicare will consider it a conflict of interest. Pt doesn't understand why it is a conflict of interest.  She will need the referral sent some to an outside place for the CPAP.

## 2024-02-08 NOTE — TELEPHONE ENCOUNTER
Faxed sleep study, face sheet and orders for cpap to [facility].  .  Faisal Weldon, Visit facilitator.

## 2024-02-15 ENCOUNTER — TRANSFERRED RECORDS (OUTPATIENT)
Dept: HEALTH INFORMATION MANAGEMENT | Facility: CLINIC | Age: 69
End: 2024-02-15
Payer: MEDICARE

## 2024-02-15 DIAGNOSIS — I10 ESSENTIAL HYPERTENSION: ICD-10-CM

## 2024-02-15 RX ORDER — LOSARTAN POTASSIUM 100 MG/1
100 TABLET ORAL DAILY
Qty: 90 TABLET | Refills: 3 | Status: SHIPPED | OUTPATIENT
Start: 2024-02-15

## 2024-02-26 DIAGNOSIS — F41.9 ANXIETY: ICD-10-CM

## 2024-02-27 ENCOUNTER — TELEPHONE (OUTPATIENT)
Dept: SLEEP MEDICINE | Facility: CLINIC | Age: 69
End: 2024-02-27
Payer: MEDICARE

## 2024-02-27 RX ORDER — ESCITALOPRAM OXALATE 10 MG/1
10 TABLET ORAL DAILY
Qty: 90 TABLET | Refills: 0 | Status: SHIPPED | OUTPATIENT
Start: 2024-02-27 | End: 2024-05-28

## 2024-03-20 ENCOUNTER — E-VISIT (OUTPATIENT)
Dept: FAMILY MEDICINE | Facility: CLINIC | Age: 69
End: 2024-03-20
Payer: MEDICARE

## 2024-03-20 ENCOUNTER — TELEPHONE (OUTPATIENT)
Dept: INTERNAL MEDICINE | Facility: CLINIC | Age: 69
End: 2024-03-20

## 2024-03-20 DIAGNOSIS — R05.9 COUGH, UNSPECIFIED TYPE: Primary | ICD-10-CM

## 2024-03-20 PROCEDURE — 99207 PR NON-BILLABLE SERV PER CHARTING: CPT | Performed by: INTERNAL MEDICINE

## 2024-03-20 NOTE — TELEPHONE ENCOUNTER
Please see e-visit based off symptoms patient has having I recommended an in person visit for further evaluation please triage if needed and help get in if appointments available otherwise recommend evaluation in walk-in care

## 2024-03-21 NOTE — TELEPHONE ENCOUNTER
Attempted to call #2, no answer, LMTCB.     Noted that pt has an appt on 3/22 at a clinic.     Bipin FONSECA RN

## 2024-03-22 ENCOUNTER — OFFICE VISIT (OUTPATIENT)
Dept: FAMILY MEDICINE | Facility: CLINIC | Age: 69
End: 2024-03-22
Payer: MEDICARE

## 2024-03-22 VITALS
WEIGHT: 206.4 LBS | DIASTOLIC BLOOD PRESSURE: 82 MMHG | HEART RATE: 80 BPM | RESPIRATION RATE: 20 BRPM | TEMPERATURE: 97.4 F | OXYGEN SATURATION: 98 % | SYSTOLIC BLOOD PRESSURE: 136 MMHG | HEIGHT: 70 IN | BODY MASS INDEX: 29.55 KG/M2

## 2024-03-22 DIAGNOSIS — R05.3 CHRONIC COUGH: ICD-10-CM

## 2024-03-22 DIAGNOSIS — J01.01 ACUTE RECURRENT MAXILLARY SINUSITIS: Primary | ICD-10-CM

## 2024-03-22 PROCEDURE — 99213 OFFICE O/P EST LOW 20 MIN: CPT

## 2024-03-22 RX ORDER — BENZONATATE 100 MG/1
100 CAPSULE ORAL 3 TIMES DAILY PRN
Qty: 30 CAPSULE | Refills: 1 | Status: SHIPPED | OUTPATIENT
Start: 2024-03-22 | End: 2024-03-27

## 2024-03-22 ASSESSMENT — PAIN SCALES - GENERAL: PAINLEVEL: MILD PAIN (3)

## 2024-03-22 NOTE — PROGRESS NOTES
Assessment & Plan     Acute recurrent maxillary sinusitis  Prescribed Augmentin for possible sinus infection, take as prescribed.  Take with food to decrease GI upset.  May take probiotic.  Continue take Flonase as needed for nasal congestion.  Return back to clinic if symptoms do not improve after 48 hours on antibiotics.  - amoxicillin-clavulanate (AUGMENTIN) 875-125 MG tablet; Take 1 tablet by mouth 2 times daily    Chronic cough  Tessalon Perles every 8 hours as needed for cough suppression.  Recommended taking Mucinex for thinning of secretions, make sure to drink plenty of water.    - benzonatate (TESSALON) 100 MG capsule; Take 1 capsule (100 mg) by mouth 3 times daily as needed for cough    Subjective   Dorita is a 68 year old, presenting for the following health issues:  Cough (Wet cough x 3 weeks. Patient states that she has also had a runny nose, and some body aches. Patient has taken tylenol for the body aches. Patient also has taken otc cough medicine. )      3/22/2024    12:28 PM   Additional Questions   Roomed by Nathalia NATION CMA     Cough  Productive cough with yellowish/green phlegm for approximately 2 to 3 weeks.  Started taking Flonase for nasal congestion for the past 2 weeks, has noted some improvement.  Dyspnea with exertion due to chest congestion.  Reports left-sided sinus pressure with left-sided ear pressure.  History of sinusitis, took course of Augmentin 11/2023 for possible sinusitis diagnosed through e-visit.  After completion of antibiotics symptoms resolved.  Reports current symptoms are similar to previous symptoms.  Denies fever, dyspnea at rest, palpitations, difficulty breathing, blood in sputum.  Two home COVID test negative.  History of pneumonia >20 years ago.     Start using CPAP machine for KRISTIE approximately 6-7 weeks ago.  Has been having trouble with humidified warm air, only cold air expels from machine.  Has been working with CPAP company for troubleshooting.  Believes onset  of viral symptoms was related to cold air from CPAP machine.    Review of Systems  Review of systems negative otherwise known HPI.    Past Medical History:   Diagnosis Date    Acne     Allergic rhinitis     Gout     Hx of radiation therapy     Hyperlipemia     Idiopathic urticaria 10/31/2016    Irritable bowel syndrome     Malignant neoplasm of right breast (H) 2016    Migraine     Mild traumatic brain injury (H) 2017    Rosacea      Past Surgical History:   Procedure Laterality Date    BIOPSY BREAST Right     ELBOW SURGERY      LUMPECTOMY BREAST Right     RADIATION IMPLANT, FEMALE       Family History   Problem Relation Age of Onset    Breast Cancer Mother          age 46    Coronary Artery Disease Father          age 60    Other - See Comments Sister         benign brain tumor    Bipolar Disorder Sister     Kidney Cancer Sister     Coronary Artery Disease Sister          age 74    Snoring Sister     Multiple myeloma Brother         has amyloidosis as well    Cancer Brother         ? [facility]    Kidney Disease Brother         had a transplant    No Known Problems Paternal Grandfather     Breast Cancer Maternal Aunt      Social History     Tobacco Use    Smoking status: Some Days     Packs/day: 0.10     Years: 20.00     Additional pack years: 0.00     Total pack years: 2.00     Types: Cigarettes     Passive exposure: Never    Smokeless tobacco: Never   Substance Use Topics    Alcohol use: Not Currently     Alcohol/week: 2.0 standard drinks of alcohol     Types: 2 Standard drinks or equivalent per week     Current Outpatient Medications   Medication    ACCU-CHEK GUIDE test strip    allopurinol (ZYLOPRIM) 100 MG tablet    amoxicillin-clavulanate (AUGMENTIN) 875-125 MG tablet    ascorbic acid, vitamin C, (VITAMIN C) 1000 MG tablet    atorvastatin (LIPITOR) 20 MG tablet    benzonatate (TESSALON) 100 MG capsule    blood glucose monitoring (SOFTCLIX) lancets    calcium-vitamin D (CALCIUM-VITAMIN  "D) 500 mg(1,250mg) -200 unit per tablet    escitalopram (LEXAPRO) 10 MG tablet    losartan (COZAAR) 100 MG tablet    triamcinolone (KENALOG) 0.1 % external cream    vitamin D3 (CHOLECALCIFEROL) 25 mcg (1000 units) tablet     No current facility-administered medications for this visit.          Objective    /82 (BP Location: Left arm, Patient Position: Sitting, Cuff Size: Adult Large)   Pulse 80   Temp 97.4  F (36.3  C) (Temporal)   Resp 20   Ht 1.765 m (5' 9.5\")   Wt 93.6 kg (206 lb 6.4 oz)   LMP  (LMP Unknown)   SpO2 98%   BMI 30.04 kg/m    Body mass index is 30.04 kg/m .  Physical Exam   General: Alert, oriented, no acute distress.    Head: Normocephalic and atraumatic.   Eyes: Conjunctiva and sclera clear. JASMINE.   Ears: External ear nontender. TMs intact and clear. Grossly normal hearing.   Oropharynx: Dentition intact. Oral and posterior pharynx pink and moist.     Neck: Supple, no masses or nodes. No adenopathy.    Face: Moderate tenderness over left maxillary sinus.  Respiratory: Lungs clear, unlabored. No rales, rhonchi, or wheezes. Dry cough noted.   Cardiovascular: Regular rate and rhythm, S1 and S2. No murmurs. No peripheral edema.     Musculoskeletal: No joint tenderness, deformity, or swelling.     Skin: No suspicious lesions, rashes, or abnormalities.    Neurologic: No gross motor or sensory deficit. Mentation intact and speech normal.     Psychiatric: Appropriate affect.     Signed Electronically by: JUDI Espinoza CNP    Answers submitted by the patient for this visit:  General Questionnaire (Submitted on 3/22/2024)  Chief Complaint: Chronic problems general questions HPI Form  How many servings of fruits and vegetables do you eat daily?: 2-3  On average, how many sweetened beverages do you drink each day (Examples: soda, juice, sweet tea, etc.  Do NOT count diet or artificially sweetened beverages)?: 1  How many minutes a day do you exercise enough to make your heart beat faster?: 20 " to 29  How many days a week do you exercise enough to make your heart beat faster?: 3 or less  How many days per week do you miss taking your medication?: 1  General Concern (Submitted on 3/22/2024)  Chief Complaint: Chronic problems general questions HPI Form  What is the reason for your visit today?: cold

## 2024-03-22 NOTE — PATIENT INSTRUCTIONS
Take Augmentin twice daily for 7 days for possible sinusitis. Take with food. May take probiotics for gut.   Tessalon perles every 8 hours as needed for cough suppression.  Mucinex for thinning of secretions, drink a lot of water.   Continue with Flonase nasal spray for nasal congestion.   Return back to clinic if not getting better after 48 hours on antibiotics.

## 2024-03-22 NOTE — TELEPHONE ENCOUNTER
Patient being seen today at [facility] for the cough complaint related to eVisit 3/20.    Closing this encounter.    Ej Sutton RN

## 2024-03-27 ENCOUNTER — OFFICE VISIT (OUTPATIENT)
Dept: FAMILY MEDICINE | Facility: CLINIC | Age: 69
End: 2024-03-27
Payer: MEDICARE

## 2024-03-27 VITALS
WEIGHT: 202 LBS | SYSTOLIC BLOOD PRESSURE: 139 MMHG | HEART RATE: 82 BPM | BODY MASS INDEX: 28.92 KG/M2 | RESPIRATION RATE: 18 BRPM | OXYGEN SATURATION: 97 % | HEIGHT: 70 IN | TEMPERATURE: 98.3 F | DIASTOLIC BLOOD PRESSURE: 77 MMHG

## 2024-03-27 DIAGNOSIS — T88.6XXD ANAPHYLACTIC REACTION DUE TO ADVERSE EFFECT OF CORRECT DRUG OR MEDICAMENT PROPERLY ADMINISTERED, SUBSEQUENT ENCOUNTER: ICD-10-CM

## 2024-03-27 DIAGNOSIS — J01.90 ACUTE SINUSITIS WITH SYMPTOMS > 10 DAYS: ICD-10-CM

## 2024-03-27 DIAGNOSIS — Z09 ENCOUNTER FOR EXAMINATION FOLLOWING TREATMENT AT HOSPITAL: Primary | ICD-10-CM

## 2024-03-27 PROCEDURE — 99214 OFFICE O/P EST MOD 30 MIN: CPT

## 2024-03-27 ASSESSMENT — PAIN SCALES - GENERAL: PAINLEVEL: NO PAIN (0)

## 2024-03-27 NOTE — PATIENT INSTRUCTIONS
I am glad to hear that your anaphylactic type symptoms have resolved since you left the emergency department.    I can appreciate that this was a scary experience for you, and I understand why you would be hesitant to add on any new medications at this time.  I am reassured by your physical examination and talking with you today, so I do not think it is necessary to begin any new medications at this time.    I want you to continue to monitor your blood sugar daily as you have been to make sure that it continues to return to normal levels.  The elevation in blood sugar was related to the steroid that you took, and this unfortunately is a fairly normal side effect of this medication.  As long as your blood sugar continues to return to normal, and there is no need to get too excited about this short-term bump in your levels.  I do not believe that you need to take the medication that was prescribed to manage her blood sugars as long as the continue to make a downward trend.  Please follow-up in the next 5 days if your blood sugars are consistently continuing to be above 200, or if you are symptomatic at all with concerns such as numbness or tingling in your extremities, persistent headache, or blurry or double vision.    If there are over-the-counter remedies that you have tried in the past to manage sinus infection concerns, you can try these out to manage your symptoms.  I have listed some options below that you can try out at your own comfortability.    Saline spray: I would like you to  with saline spray over-the-counter.  This works best when you lean slightly forward, insert the spray nozzle into your nostril, and direct the nozzle towards the opposite side of your face.  This is sometimes easier to be done in the shower over the sink as it can get a little bit messy.  You will know that you have done this correctly if your eyes slightly water after spraying the solution.  You can do this as prescribed on  the box for as long as it takes to treat your symptoms.    Ibuprofen and Tylenol: You can take ibuprofen and Tylenol as prescribed on the box around the clock.  You can either take them on alternating schedules or you can take them together.  These medications work differently in your body, and are safe to be taken together.    Humidifier: Using a humidifier in the area that you sleep or taking a very hot shower to inhale some of the vaporized steam can help to open up your nasal passages and sinuses and encourage them to drain.    Rest: get adequate rest including 7-8 hours of sleep, and low activity levels during the day to encourage healing. Avoid high impact exercise and rigorous physical labor    Nutrition: support healing by fueling your body with healthy foods. Fruits, vegetables, and whole foods are all great options!    Hydration: increase fluid intake. Illness leads to increased dehydration, so your body needs more fluid intake than it might when you are healthy. Young and sugar free teas are great options. Try to avoid beverages that cause more dehydration including coffee, soda, energy drinks, and alcohol.     As we discussed, if your symptoms do not improve or worsen over the next few days, we may need to revisit the idea of treating your symptoms with an antibiotic.  Please follow-up in the next few days to let me know how you are feeling.     Please seek immediate medical attention with symptoms of chest pain, shortness of breath, difficulty breathing or swallowing, high-grade fever, or nausea and vomiting that prevents you from keeping food or drinks down.

## 2024-04-08 ENCOUNTER — TELEPHONE (OUTPATIENT)
Dept: EDUCATION SERVICES | Facility: CLINIC | Age: 69
End: 2024-04-08
Payer: MEDICARE

## 2024-04-08 DIAGNOSIS — E11.65 TYPE 2 DIABETES MELLITUS WITH HYPERGLYCEMIA, WITHOUT LONG-TERM CURRENT USE OF INSULIN (H): ICD-10-CM

## 2024-04-08 RX ORDER — BLOOD SUGAR DIAGNOSTIC
STRIP MISCELLANEOUS
Qty: 100 STRIP | Refills: 5 | Status: SHIPPED | OUTPATIENT
Start: 2024-04-08

## 2024-04-08 RX ORDER — LANCETS
EACH MISCELLANEOUS
Qty: 100 EACH | Refills: 4 | Status: SHIPPED | OUTPATIENT
Start: 2024-04-08

## 2024-04-08 NOTE — TELEPHONE ENCOUNTER
Called and rescheduled pt's appt from 4/27 to 7/1.  Pt wanted to let Sandra know that the reason she needed to be seen was because her pharmacy told her that she cannot get her test strips and lancets until she gets a paper/form that says why she needs them.

## 2024-04-08 NOTE — TELEPHONE ENCOUNTER
I sent refills to [facility] for both testing strips as well as lancets - unsure as to what form is needed as long as pt has dx of DM

## 2024-05-12 ENCOUNTER — HEALTH MAINTENANCE LETTER (OUTPATIENT)
Age: 69
End: 2024-05-12

## 2024-05-28 ENCOUNTER — PATIENT OUTREACH (OUTPATIENT)
Dept: CARE COORDINATION | Facility: CLINIC | Age: 69
End: 2024-05-28
Payer: MEDICARE

## 2024-05-28 DIAGNOSIS — F41.9 ANXIETY: ICD-10-CM

## 2024-05-28 RX ORDER — ESCITALOPRAM OXALATE 10 MG/1
10 TABLET ORAL DAILY
Qty: 90 TABLET | Refills: 0 | Status: SHIPPED | OUTPATIENT
Start: 2024-05-28 | End: 2024-07-17

## 2024-05-28 NOTE — TELEPHONE ENCOUNTER
Refill Request  Medication name: Pending Prescriptions:                       Disp   Refills    escitalopram (LEXAPRO) 10 MG tablet       90 tab*0            Sig: Take 1 tablet (10 mg) by mouth daily    Requested Pharmacy:  [id number] - [place], [place] - [address] AT [facility] [address]

## 2024-05-29 DIAGNOSIS — M1A.00X0 IDIOPATHIC CHRONIC GOUT WITHOUT TOPHUS, UNSPECIFIED SITE: ICD-10-CM

## 2024-05-29 RX ORDER — ALLOPURINOL 100 MG/1
100 TABLET ORAL DAILY
Qty: 90 TABLET | Refills: 3 | OUTPATIENT
Start: 2024-05-29

## 2024-05-29 NOTE — TELEPHONE ENCOUNTER
Patient should reach out to her primary provider.  I had seen the patient once for an acute office visit.

## 2024-06-11 DIAGNOSIS — M1A.00X0 IDIOPATHIC CHRONIC GOUT WITHOUT TOPHUS, UNSPECIFIED SITE: ICD-10-CM

## 2024-06-11 RX ORDER — ALLOPURINOL 100 MG/1
100 TABLET ORAL DAILY
Qty: 90 TABLET | Refills: 0 | Status: SHIPPED | OUTPATIENT
Start: 2024-06-11

## 2024-07-01 ENCOUNTER — LAB (OUTPATIENT)
Dept: LAB | Facility: CLINIC | Age: 69
End: 2024-07-01
Payer: MEDICARE

## 2024-07-01 ENCOUNTER — TRANSFERRED RECORDS (OUTPATIENT)
Dept: HEALTH INFORMATION MANAGEMENT | Facility: CLINIC | Age: 69
End: 2024-07-01

## 2024-07-01 ENCOUNTER — ALLIED HEALTH/NURSE VISIT (OUTPATIENT)
Dept: EDUCATION SERVICES | Facility: CLINIC | Age: 69
End: 2024-07-01
Payer: MEDICARE

## 2024-07-01 DIAGNOSIS — E11.9 DIABETES MELLITUS, TYPE 2 (H): Primary | ICD-10-CM

## 2024-07-01 DIAGNOSIS — E11.9 DIABETES MELLITUS, TYPE 2 (H): ICD-10-CM

## 2024-07-01 DIAGNOSIS — E11.00 TYPE 2 DIABETES MELLITUS WITH HYPEROSMOLARITY WITHOUT COMA, WITHOUT LONG-TERM CURRENT USE OF INSULIN (H): Primary | ICD-10-CM

## 2024-07-01 LAB — HBA1C MFR BLD: 8.1 % (ref 0–5.6)

## 2024-07-01 PROCEDURE — 36415 COLL VENOUS BLD VENIPUNCTURE: CPT

## 2024-07-01 PROCEDURE — 99207 PR NO CHARGE NURSE ONLY: CPT

## 2024-07-01 PROCEDURE — 83036 HEMOGLOBIN GLYCOSYLATED A1C: CPT

## 2024-07-01 PROCEDURE — G0108 DIAB MANAGE TRN  PER INDIV: HCPCS

## 2024-07-01 RX ORDER — LANCETS
EACH MISCELLANEOUS
Qty: 100 EACH | Refills: 6 | Status: SHIPPED | OUTPATIENT
Start: 2024-07-01

## 2024-07-01 NOTE — PATIENT INSTRUCTIONS
1. Eat 3 balanced meals each day - Monitor carb intake and limit to 45-60 grams per meal  This would be equal to 3-4 choices ~  1 choice = 15 grams    Do not wait longer than 4-5 hours to eat something  Snacks limit to no more than 30 grams of carbohydrates or 2 choices  Make sure you include protein source with each meal and at bedtime - this has been shown to help with blood glucose elevations    2.  Check blood sugars once each day - please try and alternate the times you check between am fasting( right after you wake before eating) and 2 hours AFTER dinner - this allows us to get a better idea of what is happening throughout your day , not at just one time     Fasting and before meal target is 80 - 130   2 hours after a meal target is < 180  remember to bring meter and log book to all appointments    3. Incorporate 30 minutes activity into each day - does not need to be all at one time & walking counts    4. Call [employer] and find out your co pays are for the following Ozempic, Mounjaro, Trulicity, Bydureon Bcise, Regina Wu, Farxiga      Thank you for coming in to see me today !      I value your experience and would be very thankful for your time in providing feedback in our clinic survey.    You may receive an e-mail, text message or even something in the mail from [employer].  This is a survey to let us know how we are doing - the survey will be related to your diabetes education and me.

## 2024-07-01 NOTE — LETTER
7/1/2024         RE: Dorita Stringer  [address]  [address] [place] [address]        Dear Colleague,    Thank you for referring your patient, Dorita Stringer, to the [facility]. Please see a copy of my visit note below.    No notes on file

## 2024-07-02 NOTE — PROGRESS NOTES
"Diabetes Self-Management Education & Support    Presents for: Individual review    Type of Service: In Person Visit      ASSESSMENT:  Dorita is a very pleasant 68-year-old who returns to clinic today to review blood glucose, recheck A1c, and to assess/assist her with her current diabetes self-management skills with the previous A1c not meeting ADA goal.  She arrived today accompanied by her sister and had her glucose meter with her.  She informed me that she was notified that her insurance is no longer covering her testing supplies and she has been buying them out-of-pocket.  I told her I would reorder a meter and testing supplies for her today and provide instructions with order to make sure they dispense supplies that are preferred by her formulary. Dorita is currently managing her diabetes without the assistance of any type of medication.  Blood glucose data as well as intake was reviewed and discussed with her today.  She is eating 3 meals and other than not having a protein with breakfast they appear to be well-balanced and eaten in a timely fashion.  She informing today she just returned yesterday from a 5-week trip to [place].  During her time gone she reported she sometimes had difficulty with complying to a carbohydrate limited diet.  Moving forward she said she is looking forward to getting back on track and getting back to her \"normal\" life.  Unable to check her A1c prior to the visit so I will have her go to lab after visit today and have her A1c checked blood based upon the blood glucose values that were reviewed today, I anticipate we will be needing to add an adjunct therapy.  I provided her information on both SGLT2's as well as GLP-1's and gave her a list of medication names to check with her insurance company for coverage and co-pay.  I will meet with her again at the end of July.  This will give her a couple weeks to work on diet, we will have her A1c results, and hopefully will have more " information on financial feasibility of medications that were discussed today.  I instructed her to call with any questions or concerns that might come up before then.    Patient's most recent   Lab Results   Component Value Date    A1C 8.1 07/01/2024     is not meeting goal of  7-7.5%    Diabetes knowledge and skills assessment:   Patient is knowledgeable in diabetes management concepts related to: Healthy Eating and Reducing Risks    Continue education with the following diabetes management concepts: Healthy Eating, Being Active, Monitoring, Reducing Risks, and Healthy Coping    Based on learning assessment above, most appropriate setting for further diabetes education would be: Individual setting.      PLAN  1. Eat 3 balanced meals each day - Monitor carb intake and limit to 45-60 grams per meal  This would be equal to 3-4 choices ~  1 choice = 15 grams    Do not wait longer than 4-5 hours to eat something  Snacks limit to no more than 30 grams of carbohydrates or 2 choices  Make sure you include protein source with each meal and at bedtime - this has been shown to help with blood glucose elevations    2.  Check blood sugars once each day - please try and alternate the times you check between am fasting( right after you wake before eating) and 2 hours AFTER dinner - this allows us to get a better idea of what is happening throughout your day , not at just one time     Fasting and before meal target is 80 - 130   2 hours after a meal target is < 180  remember to bring meter and log book to all appointments    3. Incorporate 30 minutes activity into each day - does not need to be all at one time & walking counts    4. Call [facility] and find out your co pays are for the following Ozempic, Mounjaro, Trulicity, Bydureon Bcise, Rybelsus, Jardiance, Farxiga    Waiting for results of A1c which was done after our visit today.  After receiving this information will be better and able to develop the most effective plan of  "care for patient but anticipate we will be needing to start a medication based upon blood glucose data that was reviewed today.    Topics to cover at upcoming visits: Healthy Eating, Being Active, Monitoring, Taking Medication, Reducing Risks, and Healthy Coping    Follow-up: 7/29/24    See Care Plan for co-developed, patient-state behavior change goals.  AVS provided for patient today.    Education Materials Provided:  Medication Information on SGLT2 and GLP1's      SUBJECTIVE/OBJECTIVE:  Presents for: Individual review  Accompanied by: Self  Diabetes education in the past 24 mo: Yes  Focus of Visit: Monitoring, Reducing Risks, Taking Medication, Healthy Coping, Healthy Eating, Assistance w/ making life changes, Self-care behavioral goal setting, Being Active  Diabetes type: Type 2  Date of diagnosis: 2022  Disease course: Improving  Transportation concerns: No  Difficulty affording diabetes medication?: Sometimes  Other concerns:: None (TBI in 2016)  Cultural Influences/Ethnic Background:  Not  or       Diabetes Symptoms & Complications:  Weight trend: Stable  Symptom course: Stable  Disease course: Improving  Complications assessed today?: No    Patient Problem List and Family Medical History reviewed for relevant medical history, current medical status, and diabetes risk factors.    Vitals:  LMP  (LMP Unknown)   Estimated body mass index is 28.98 kg/m  as calculated from the following:    Height as of 3/27/24: 1.778 m (5' 10\").    Weight as of 3/27/24: 91.6 kg (202 lb).   Last 3 BP:   BP Readings from Last 3 Encounters:   03/27/24 139/77   03/22/24 136/82   01/04/24 (!) 145/96       History   Smoking Status    Some Days    Packs/day: 0.10    Years: 20.00    Types: Cigarettes   Smokeless Tobacco    Never       Labs:  Lab Results   Component Value Date    A1C 8.1 07/01/2024     Lab Results   Component Value Date     01/03/2024     06/03/2022     Lab Results   Component Value Date    LDL " " 06/03/2022      Comment:      Cannot estimate LDL when triglyceride exceeds 400 mg/dL     06/03/2022     Direct Measure HDL   Date Value Ref Range Status   06/03/2022 46 (L) >=50 mg/dL Final     Comment:     HDL Cholesterol Reference Range:     0-2 years:   No reference ranges established for patients under 2 years old  at [facility] for lipid analytes.    2-8 years:  Greater than 45 mg/dL     18 years and older:   Female: Greater than or equal to 50 mg/dL   Male:   Greater than or equal to 40 mg/dL   ]  GFR Estimate   Date Value Ref Range Status   01/03/2024 73 >60 mL/min/1.73m2 Final   07/16/2020 >60 >60 mL/min/1.73m2 Final     GFR Estimate If Black   Date Value Ref Range Status   07/16/2020 >60 >60 mL/min/1.73m2 Final     Lab Results   Component Value Date    CR 0.86 01/03/2024     No results found for: \"MICROALBUMIN\"    Healthy Eating:  Healthy Eating Assessed Today: Yes  Cultural/Worship diet restrictions?: No  Do you have any food allergies or intolerances?: Yes  Please list your food allergies / intolerances: blueberry  Meal planning/habits: Smaller portions  Who cooks/prepares meals for you?: Self, Other  Who purchases food in  your home?: Self  How many times a week on average do you eat food made away from home (restaurant/take-out)?: 3  Meals include: Breakfast, Dinner, Afternoon Snack  Breakfast:  will have coffee with 1/2 & 1/2  bowl of Cheerios  Lunch: frequently will go out to lunch with her sister where they will always split a meal  Dinner: last night was baked chix and a salad  Snacks: nuts 1/4 to 1/2 c or cheese  Beverages: Water, Coffee, Milk, Soda  Has patient met with a dietitian in the past?: No    Being Active:  Being Active Assessed Today: Yes  Exercise:: Currently not exercising  Barrier to exercise: None    Monitoring:  Monitoring Assessed Today: Yes  Did patient bring glucose meter to appointment? : Yes  Blood Glucose Meter: Accu-chek  Times checking blood " sugar at home (number): 1  Times checking blood sugar at home (per): Day  Blood glucose trend: No change    The following are Dorita's most recent blood glucose readings dating  through .  All but 1 are fasting and the newest are listed first.  FB, 173, 167, 197, 188, 170, 182, 201, 166, 162, 217  Post dinner: 251      Taking Medications:      Taking Medication Assessed Today: Yes  Current Treatments: Diet    Problem Solving:  Problem Solving Assessed Today: Yes  Is the patient at risk for hypoglycemia?: No  Is the patient at risk for DKA?: No  Does patient have severe weather/disaster plan for diabetes management?: Not Needed  Does patient have sick day plan for diabetes management?: Not Needed              Reducing Risks:  Reducing Risks Assessed Today: Yes  Diabetes Risks: Age over 45 years, Sedentary Lifestyle  CAD Risks: Post-menopausal, Sedentary lifestyle, Hypertension, Diabetes Mellitus, Family history  Has dilated eye exam at least once a year?: Yes  Sees dentist every 6 months?: Yes  Feet checked by healthcare provider in the last year?: Yes    Healthy Coping:  Healthy Coping Assessed Today: Yes  Emotional response to diabetes: Ready to learn  Informal Support system:: Family  Stage of change: ACTION (Actively working towards change)  Support resources: In-person Offerings  Patient Activation Measure Survey Score:       No data to display                  Care Plan and Education Provided:  Healthy Eating: Balanced meals, Carbohydrate Counting, Consistency in amount and timing of carbohydrate intake, Eating out, and Portion control, Being Active: Amount recommended (150 minutes moderate or 75 minutes vigorous activity and 2-3 days strength training per week), Finding a physical activity routine that works for you, and Relationship of activity to glucose, Monitoring: Frequency of monitoring and Individual glucose targets, Problem Solving: High glucose - causes, signs/symptoms, treatment and  prevention, Reducing Risks: Eye care, Goal for A1c, how it relates to glucose and how often to check, Prevention, early diagnostic measures and treatment of complications, and A1C - goals, relating to blood glucose levels, how often to check, and Healthy Coping: Benefits of making appropriate lifestyle changes, Identifying helpful resources, and Utilize support systems    Thank you,  Sandra Hill RN CDCES  Certified Diabetes Care and   Visit type : DSMT      Time Spent: 60 minutes  Encounter Type: Individual    Any diabetes medication dose changes were made via the CDE Protocol per the patient's referring provider and primary care provider. A copy of this encounter was shared with the provider.   Much or all of the text in this note was generated through the use of the Dragon Dictate voice-to-text software.Errors in spelling or words which seem out of context are unintentional. Sound alike errors, in particular, may have escaped editing.

## 2024-07-17 ENCOUNTER — OFFICE VISIT (OUTPATIENT)
Dept: FAMILY MEDICINE | Facility: CLINIC | Age: 69
End: 2024-07-17
Payer: MEDICARE

## 2024-07-17 VITALS
HEIGHT: 70 IN | OXYGEN SATURATION: 99 % | RESPIRATION RATE: 16 BRPM | HEART RATE: 83 BPM | WEIGHT: 201 LBS | TEMPERATURE: 98.1 F | SYSTOLIC BLOOD PRESSURE: 124 MMHG | DIASTOLIC BLOOD PRESSURE: 78 MMHG | BODY MASS INDEX: 28.77 KG/M2

## 2024-07-17 DIAGNOSIS — F41.9 ANXIETY: ICD-10-CM

## 2024-07-17 DIAGNOSIS — E11.9 TYPE 2 DIABETES MELLITUS WITHOUT COMPLICATION, WITHOUT LONG-TERM CURRENT USE OF INSULIN (H): ICD-10-CM

## 2024-07-17 DIAGNOSIS — Z12.31 VISIT FOR SCREENING MAMMOGRAM: ICD-10-CM

## 2024-07-17 DIAGNOSIS — Z12.11 SCREEN FOR COLON CANCER: ICD-10-CM

## 2024-07-17 DIAGNOSIS — Z23 NEED FOR SHINGLES VACCINE: ICD-10-CM

## 2024-07-17 DIAGNOSIS — F17.219 CIGARETTE NICOTINE DEPENDENCE WITH NICOTINE-INDUCED DISORDER: ICD-10-CM

## 2024-07-17 DIAGNOSIS — E78.00 HYPERCHOLESTEREMIA: ICD-10-CM

## 2024-07-17 DIAGNOSIS — E11.9 DM TYPE 2, GOAL HBA1C < 7% (H): Primary | ICD-10-CM

## 2024-07-17 DIAGNOSIS — Z12.31 ENCOUNTER FOR SCREENING MAMMOGRAM FOR BREAST CANCER: ICD-10-CM

## 2024-07-17 LAB
CHOLEST SERPL-MCNC: 244 MG/DL
CREAT UR-MCNC: 174 MG/DL
FASTING STATUS PATIENT QL REPORTED: YES
HDLC SERPL-MCNC: 47 MG/DL
LDLC SERPL CALC-MCNC: ABNORMAL MG/DL
LDLC SERPL DIRECT ASSAY-MCNC: 131 MG/DL
MICROALBUMIN UR-MCNC: 19.7 MG/L
MICROALBUMIN/CREAT UR: 11.32 MG/G CR (ref 0–25)
NONHDLC SERPL-MCNC: 197 MG/DL
TRIGL SERPL-MCNC: 437 MG/DL

## 2024-07-17 PROCEDURE — 82570 ASSAY OF URINE CREATININE: CPT | Performed by: FAMILY MEDICINE

## 2024-07-17 PROCEDURE — 99214 OFFICE O/P EST MOD 30 MIN: CPT | Performed by: FAMILY MEDICINE

## 2024-07-17 PROCEDURE — 83721 ASSAY OF BLOOD LIPOPROTEIN: CPT | Performed by: FAMILY MEDICINE

## 2024-07-17 PROCEDURE — 82043 UR ALBUMIN QUANTITATIVE: CPT | Performed by: FAMILY MEDICINE

## 2024-07-17 PROCEDURE — 80061 LIPID PANEL: CPT | Performed by: FAMILY MEDICINE

## 2024-07-17 PROCEDURE — 36415 COLL VENOUS BLD VENIPUNCTURE: CPT | Performed by: FAMILY MEDICINE

## 2024-07-17 PROCEDURE — G2211 COMPLEX E/M VISIT ADD ON: HCPCS | Performed by: FAMILY MEDICINE

## 2024-07-17 RX ORDER — ESCITALOPRAM OXALATE 10 MG/1
10 TABLET ORAL DAILY
Qty: 90 TABLET | Refills: 1 | Status: SHIPPED | OUTPATIENT
Start: 2024-07-17

## 2024-07-17 RX ORDER — ATORVASTATIN CALCIUM 20 MG/1
20 TABLET, FILM COATED ORAL DAILY
Qty: 90 TABLET | Refills: 1 | Status: CANCELLED | OUTPATIENT
Start: 2024-07-17

## 2024-07-17 ASSESSMENT — PATIENT HEALTH QUESTIONNAIRE - PHQ9
SUM OF ALL RESPONSES TO PHQ QUESTIONS 1-9: 0
SUM OF ALL RESPONSES TO PHQ QUESTIONS 1-9: 0
10. IF YOU CHECKED OFF ANY PROBLEMS, HOW DIFFICULT HAVE THESE PROBLEMS MADE IT FOR YOU TO DO YOUR WORK, TAKE CARE OF THINGS AT HOME, OR GET ALONG WITH OTHER PEOPLE: NOT DIFFICULT AT ALL

## 2024-07-17 NOTE — PROGRESS NOTES
"Assessment & Plan     DM type 2, goal HbA1c < 7% (H)  She is working with the diabetes educator and is compliant, but her HgA1c was >8    Type 2 diabetes mellitus without complication, without long-term current use of insulin (H)  - Lipid panel reflex to direct LDL Fasting  - Albumin Random Urine Quantitative with Creat Ratio  - sitagliptin (JANUVIA) 100 MG tablet  Dispense: 90 tablet; Refill: 3 PA requested.  - Lipid panel reflex to direct LDL Fasting  - Albumin Random Urine Quantitative with Creat Ratio    Need for shingles vaccine  - zoster vaccine recombinant adjuvanted (SHINGRIX) injection  Dispense: 0.5 mL; Refill: 0    Cigarette nicotine dependence with nicotine-induced disorder  The patient states she is not ready to quit.  She had not heard of lung cancer screening. She agreed to review information about this and we will discuss it again at the next visit.    Screen for colon cancer  She had polyps 5 years ago and went to [facility]. She will call them.  - Colonoscopy Screening  Referral    Hypercholesteremia  Fasting cholesterol this morning.   Will refill atorvastatin appropriately.    Anxiety  - escitalopram (LEXAPRO) 10 MG tablet  Dispense: 90 tablet; Refill: 1    Encounter for screening mammogram for breast cancer  - MA Screening Bilateral w/ Duc    She will schedule the Medicare preventative appointment today.    Nicotine/Tobacco Cessation  She reports that she has been smoking cigarettes. She has a 2 pack-year smoking history. She has never been exposed to tobacco smoke. She has never used smokeless tobacco.  Nicotine/Tobacco Cessation Plan  Information offered: Patient not interested at this time    BMI  Estimated body mass index is 28.84 kg/m  as calculated from the following:    Height as of this encounter: 1.778 m (5' 10\").    Weight as of this encounter: 91.2 kg (201 lb).     Jonnathan Kevin is a 68 year old, presenting for the following health issues:  Diabetes (Medication follow up; " "possibly establish care with New MD )      7/17/2024     7:54 AM   Additional Questions   Roomed by BLAKE Wang   Accompanied by alone         7/17/2024     7:54 AM   Patient Reported Additional Medications   Patient reports taking the following new medications none     Fasting glucoses are 150-160. At bedtime around 180. Gets cheese or cottage cheese at night.  [facility] said she needed a letter for glucose monitoring.  She has not researched her medication options yet. She was prescribed Jardiance in the past and it was going to cost $1100/month.    History of Present Illness       Diabetes:   She presents for follow up of diabetes.  She is checking home blood glucose one time daily.   She checks blood glucose before meals and at bedtime.  Blood glucose is never over 200 and never under 70.  When her blood glucose is low, the patient is asymptomatic for confusion, blurred vision, lethargy and reports not feeling dizzy, shaky, or weak.   She has no concerns regarding her diabetes at this time.   She is not experiencing numbness or burning in feet, excessive thirst, blurry vision, weight changes or redness, sores or blisters on feet.           She eats 2-3 servings of fruits and vegetables daily.She consumes 1 sweetened beverage(s) daily.She exercises with enough effort to increase her heart rate 10 to 19 minutes per day.  She exercises with enough effort to increase her heart rate 3 or less days per week. She is missing 1 dose(s) of medications per week.       Objective    /78 (BP Location: Left arm, Patient Position: Sitting, Cuff Size: Adult Regular)   Pulse 83   Temp 98.1  F (36.7  C) (Tympanic)   Resp 16   Ht 1.778 m (5' 10\")   Wt 91.2 kg (201 lb)   LMP  (LMP Unknown)   SpO2 99%   BMI 28.84 kg/m    Body mass index is 28.84 kg/m .  Physical Exam   GENERAL: healthy, alert and no distress  EYES: grossly normal to inspection, and conjunctivae and sclerae normal  RESP: breathing unlabored, no cough or " throat clearing.  MS: no gross musculoskeletal defects noted.  SKIN: no suspicious lesions or rashes visible  NEURO: Normal strength and tone, mentation intact and speech normal  PSYCH: mentation appears normal, affect normal/bright     Lab on 07/01/2024   Component Date Value Ref Range Status    Hemoglobin A1C 07/01/2024 8.1 (H)  0.0 - 5.6 % Final    Normal <5.7%   Prediabetes 5.7-6.4%    Diabetes 6.5% or higher     Note: Adopted from ADA consensus guidelines.   Signed Electronically by: VITOR SINGER MD

## 2024-07-18 DIAGNOSIS — E78.00 HYPERCHOLESTEREMIA: ICD-10-CM

## 2024-07-18 DIAGNOSIS — E78.2 MIXED HYPERLIPIDEMIA: Primary | ICD-10-CM

## 2024-07-18 RX ORDER — ATORVASTATIN CALCIUM 40 MG/1
40 TABLET, FILM COATED ORAL DAILY
Qty: 90 TABLET | Refills: 3 | Status: SHIPPED | OUTPATIENT
Start: 2024-07-18

## 2024-07-29 ENCOUNTER — ALLIED HEALTH/NURSE VISIT (OUTPATIENT)
Dept: EDUCATION SERVICES | Facility: CLINIC | Age: 69
End: 2024-07-29
Payer: MEDICARE

## 2024-07-29 VITALS — WEIGHT: 203.1 LBS | BODY MASS INDEX: 29.14 KG/M2

## 2024-07-29 DIAGNOSIS — E11.00 TYPE 2 DIABETES MELLITUS WITH HYPEROSMOLARITY WITHOUT COMA, WITHOUT LONG-TERM CURRENT USE OF INSULIN (H): ICD-10-CM

## 2024-07-29 PROCEDURE — G0108 DIAB MANAGE TRN  PER INDIV: HCPCS

## 2024-07-29 RX ORDER — METFORMIN HCL 500 MG
500 TABLET, EXTENDED RELEASE 24 HR ORAL
Qty: 90 TABLET | Refills: 1 | Status: SHIPPED | OUTPATIENT
Start: 2024-07-29

## 2024-07-29 NOTE — PATIENT INSTRUCTIONS
1. Eat 3 balanced meals each day - Monitor carb intake and limit to 45-60 grams per meal  This would be equal to 3-4 choices ~  1 choice = 15 grams    Do not wait longer than 4-5 hours to eat something  Snacks limit to no more than 30 grams of carbohydrates or 2 choices  Make sure you include protein source with each meal and at bedtime - this has been shown to help with blood glucose elevations    2.  Check blood sugars once each day - please try and alternate the times you check between am fasting( right after you wake before eating) and 2 hours AFTER dinner - this allows us to get a better idea of what is happening throughout your day , not at just one time     Fasting and before meal target is 80 - 140   2 hours after a meal target is < 180  remember to bring meter and log book to all appointments    3. Incorporate 30 minutes activity into each day - does not need to be all at one time & walking counts    4. Take diabetes medications as prescribed we are going to try a small dose of Metformin again - 1 tablet, 500 mg with dinner each night - let me know in a week or two, how things are going     Contact [employer] and find out co pay amounts for medication list given to you at last visit  Thank you for coming in to see me today !      I value your experience and would be very thankful for your time in providing feedback in our clinic survey.    You may receive an e-mail, text message or even something in the mail from [id number].  This is a survey to let us know how we are doing - the survey will be related to your diabetes education

## 2024-07-29 NOTE — PROGRESS NOTES
Diabetes Self-Management Education & Support    Presents for: Individual review    Type of Service: In Person Visit      ASSESSMENT:  1 is a very pleasant 68-year-old who returns to clinic today to review blood glucose, medications, and to assess/assist her with her current diabetes self-management skills with an A1c not meeting ADA goal.  She arrived today unaccompanied and had her logbook with her.  Medications were reviewed and she reported she had not yet started or is taking the Januvia that have been prescribed by her by Dr. Bauer at their visit on 7/ 1 due to cost.  Stated she was quoted $1700 which is not financially feasible for her.  She unfortunately, did not contract [facility] as she had agreed to do at our last visit to inquire about her co-pays for GLP-1 agonists and SGLT2's that I provided her a list for.  After reviewing her most recent blood glucose data and with her latest A1c results, I informed her that it is relatively clear that she is unable to successfully manage her diabetes through diet and activity alone.  I went on to remind her that diabetes it is a chronic and its progressive disease and like so many progressive diseases, even if we do everything as we should, adjunct therapy (medications) are often needed.  She asked if maybe we could reconsider trying metformin again, to which I agreed we could.  We will start her on a low-dose of extended release at dinner and see how she does.  I also informed her that even if she is able to tolerate the metformin, this will only help manage her prandial glucose-not her postprandial glucose.  She will most likely need an additional medication for these and I asked her to again contact [facility] and inquire about SGLT2's or GLP-1's.  And also inform me today she recently met with an allergist who did extensive testing on her and informed her they found allergies to trees, grass and house dust and also diagnosed her with  "\"oral allergy syndrome.\"  She told me that there are pollens in the air released from trees and grasses that react in her system to certain foods (fruits and vegetables).  They offered her injections to help with this and she is still considering this.    Patient's most recent   Lab Results   Component Value Date    A1C 8.1 07/01/2024     is not meeting goal of  7-7.5%    Diabetes knowledge and skills assessment:   Patient is knowledgeable in diabetes management concepts related to: Healthy Eating, Being Active, and Monitoring    Continue education with the following diabetes management concepts: Healthy Eating, Problem Solving, Reducing Risks, and Healthy Coping    Based on learning assessment above, most appropriate setting for further diabetes education would be: Individual setting.      PLAN    See Patient Instructions for co-developed, patient-stated behavior change goals.  AVS printed and provided to patient today. See Follow-Up section for recommended follow-up.       Topics to cover at upcoming visits: Healthy Eating, Being Active, Taking Medication, Problem Solving, Reducing Risks, and Healthy Coping    Follow-up: 10/14/24    See Care Plan for co-developed, patient-state behavior change goals.  AVS provided for patient today.    Education Materials Provided:  No new materials provided today      SUBJECTIVE/OBJECTIVE:  Presents for: Individual review  Accompanied by: Self  Diabetes education in the past 24 mo: Yes (LV 7/1/24)  Focus of Visit: Monitoring, Reducing Risks, Taking Medication, Healthy Coping, Healthy Eating, Assistance w/ making life changes, Self-care behavioral goal setting, Being Active  Diabetes type: Type 2  Date of diagnosis: 2022  Disease course: Getting harder to manage  Transportation concerns: No  Difficulty affording diabetes medication?: Sometimes  Other concerns:: None (TBI in 2016)  Cultural Influences/Ethnic Background:  Not  or       Diabetes Symptoms & " "Complications:  Weight trend: Stable  Symptom course: Stable  Disease course: Getting harder to manage  Complications assessed today?: No    Patient Problem List and Family Medical History reviewed for relevant medical history, current medical status, and diabetes risk factors.    Vitals:  Wt 92.1 kg (203 lb 1.6 oz)   LMP  (LMP Unknown)   BMI 29.14 kg/m    Estimated body mass index is 29.14 kg/m  as calculated from the following:    Height as of 7/17/24: 1.778 m (5' 10\").    Weight as of this encounter: 92.1 kg (203 lb 1.6 oz).   Last 3 BP:   BP Readings from Last 3 Encounters:   07/17/24 124/78   03/27/24 139/77   03/22/24 136/82       History   Smoking Status    Some Days    Packs/day: 0.10    Years: 20.00    Types: Cigarettes   Smokeless Tobacco    Never       Labs:  Lab Results   Component Value Date    A1C 8.1 07/01/2024     Lab Results   Component Value Date     01/03/2024     06/03/2022     Lab Results   Component Value Date    LDL  07/17/2024      Comment:      Cannot estimate LDL when triglyceride exceeds 400 mg/dL     07/17/2024     06/03/2022     Direct Measure HDL   Date Value Ref Range Status   07/17/2024 47 (L) >=50 mg/dL Final   ]  GFR Estimate   Date Value Ref Range Status   01/03/2024 73 >60 mL/min/1.73m2 Final   07/16/2020 >60 >60 mL/min/1.73m2 Final     GFR Estimate If Black   Date Value Ref Range Status   07/16/2020 >60 >60 mL/min/1.73m2 Final     Lab Results   Component Value Date    CR 0.86 01/03/2024     No results found for: \"MICROALBUMIN\"    Healthy Eating:  Healthy Eating Assessed Today: Yes  Cultural/Holiness diet restrictions?: No  Do you have any food allergies or intolerances?: Yes  Please list your food allergies / intolerances: blueberry, hazelnut, celery, tomatoes, legumes - recently dx with oral allergy syndrome ( pollens in air react with certain foods)  Meal planning/habits: Smaller portions, Avoiding sweets, Low carb  Who cooks/prepares meals for " you?: Self, Other  Who purchases food in  your home?: Self  How many times a week on average do you eat food made away from home (restaurant/take-out)?: 3  Meals include: Breakfast, Dinner, Afternoon Snack  Breakfast:  will have coffee with crustless quiche or hard boiled eggs  Lunch: frequently will go out to lunch with her sister where they will always split a meal- if home might be soup or a sandwich  Dinner: last night was bratwurst on  wheat bun, heidi slaw    usually + protein and veg or salad  Snacks: during day - fruit: berries, grapes, cherries, peaches  evenings  c cottage cheese and 2 slices cheese or nuts  Other: no white breads, little to no sweets  Beverages: Water, Coffee, Milk, Soda, Other (see Comments)  Please elaborate::  emeterio lemonade ( 5-8 CHO)  Has patient met with a dietitian in the past?: No    Being Active:  Being Active Assessed Today: Yes  Exercise:: Yes  Days per week of moderate to strenuous exercise (like a brisk walk): 6  On average, minutes per day of exercise at this level: 30  How intense was your typical exercise? : Light (like stretching or slow walking)  Exercise Minutes per Week: 180  Barrier to exercise: None    Monitoring:  Monitoring Assessed Today: Yes  Did patient bring glucose meter to appointment? : No (logbook)  Blood Glucose Meter: Accu-chek  Times checking blood sugar at home (number): 1  Times checking blood sugar at home (per): Day  Blood glucose trend: No change    Following are Dorita's most recent blood glucose readings taken from her logbook today.    FB, 158, 189, 162, 182, 171, 183, 181  2-hour post dinner: 257, 201, 215, 213, 208, 206, 206, 212    Taking Medications:  Diabetes Medication(s)       Biguanides       metFORMIN (GLUCOPHAGE XR) 500 MG 24 hr tablet Take 1 tablet (500 mg) by mouth daily (with dinner)       Dipeptidyl Peptidase-4 (DPP-4) Inhibitors       sitagliptin (JANUVIA) 100 MG tablet Take 1 tablet (100 mg) by mouth daily      Patient not taking: Reported on 7/29/2024            Taking Medication Assessed Today: Yes  Current Treatments: Diet    Problem Solving:  Problem Solving Assessed Today: Yes  Is the patient at risk for hypoglycemia?: No  Is the patient at risk for DKA?: No  Does patient have severe weather/disaster plan for diabetes management?: Not Needed  Does patient have sick day plan for diabetes management?: Not Needed              Reducing Risks:  Reducing Risks Assessed Today: Yes  Diabetes Risks: Age over 45 years, Sedentary Lifestyle  CAD Risks: Post-menopausal, Sedentary lifestyle, Hypertension, Diabetes Mellitus, Family history  Has dilated eye exam at least once a year?: Yes  Sees dentist every 6 months?: Yes  Feet checked by healthcare provider in the last year?: Yes    Healthy Coping:  Healthy Coping Assessed Today: Yes  Emotional response to diabetes: Ready to learn  Informal Support system:: Family  Stage of change: ACTION (Actively working towards change)  Support resources: In-person Offerings  Patient Activation Measure Survey Score:       No data to display                  Care Plan and Education Provided:  Healthy Eating: Balanced meals, Consistency in amount and timing of carbohydrate intake, Eating out, Label reading, and Portion control, Being Active: Amount recommended (150 minutes moderate or 75 minutes vigorous activity and 2-3 days strength training per week), Finding a physical activity routine that works for you, and Relationship of activity to glucose, Monitoring: Frequency of monitoring and Individual glucose targets, Taking Medication: Action of prescribed medication(s), Problem Solving: High glucose - causes, signs/symptoms, treatment and prevention, Reducing Risks: Eye care, Goal for A1c, how it relates to glucose and how often to check, Prevention, early diagnostic measures and treatment of complications, and A1C - goals, relating to blood glucose levels, how often to check, and Healthy Coping:  Benefits of making appropriate lifestyle changes, Identifying helpful resources, Recognize feelings about diagnosis, and Utilize support systems    Thank you,  Sandra Hill RN CDCES  Certified Diabetes Care and   Visit type : DSMT      Time Spent: 60 minutes  Encounter Type: Individual    Any diabetes medication dose changes were made via the CDE Protocol per the patient's referring provider and primary care provider. A copy of this encounter was shared with the provider.   Much or all of the text in this note was generated through the use of the Dragon Dictate voice-to-text software.Errors in spelling or words which seem out of context are unintentional. Sound alike errors, in particular, may have escaped editing.

## 2024-07-31 ENCOUNTER — TELEPHONE (OUTPATIENT)
Dept: FAMILY MEDICINE | Facility: CLINIC | Age: 69
End: 2024-07-31
Payer: MEDICARE

## 2024-07-31 NOTE — TELEPHONE ENCOUNTER
July 31, 2024    [facility] Diabetic Standard Written Order was received via fax for Dr. Bauer.  Patient label was attached to paperwork and placed in provider's inbox to be signed.    Stacia Glover

## 2024-08-01 NOTE — TELEPHONE ENCOUNTER
August 1, 2024    [facility] Diabetic Standard Written Order was picked up from outbox of Dr. Bauer and sent via fax to [phone].    Stacia Glover

## 2024-08-12 ENCOUNTER — TELEPHONE (OUTPATIENT)
Dept: INTERNAL MEDICINE | Facility: CLINIC | Age: 69
End: 2024-08-12
Payer: MEDICARE

## 2024-08-12 NOTE — TELEPHONE ENCOUNTER
Dr. Maravilla approved taking pt onto her panel, per note at pt's sister's appt.  Pt's sister will bring business card to pt and pt will call to schedule.

## 2024-08-22 ENCOUNTER — OFFICE VISIT (OUTPATIENT)
Dept: SLEEP MEDICINE | Facility: CLINIC | Age: 69
End: 2024-08-22
Payer: MEDICARE

## 2024-08-22 VITALS
BODY MASS INDEX: 28.99 KG/M2 | DIASTOLIC BLOOD PRESSURE: 87 MMHG | SYSTOLIC BLOOD PRESSURE: 124 MMHG | HEART RATE: 89 BPM | HEIGHT: 70 IN | OXYGEN SATURATION: 99 % | WEIGHT: 202.5 LBS

## 2024-08-22 DIAGNOSIS — G47.33 OSA (OBSTRUCTIVE SLEEP APNEA): Primary | ICD-10-CM

## 2024-08-22 PROCEDURE — 99213 OFFICE O/P EST LOW 20 MIN: CPT | Performed by: INTERNAL MEDICINE

## 2024-08-22 ASSESSMENT — SLEEP AND FATIGUE QUESTIONNAIRES
HOW LIKELY ARE YOU TO NOD OFF OR FALL ASLEEP WHILE SITTING AND READING: WOULD NEVER DOZE
HOW LIKELY ARE YOU TO NOD OFF OR FALL ASLEEP WHILE SITTING AND TALKING TO SOMEONE: WOULD NEVER DOZE
HOW LIKELY ARE YOU TO NOD OFF OR FALL ASLEEP WHILE LYING DOWN TO REST IN THE AFTERNOON WHEN CIRCUMSTANCES PERMIT: HIGH CHANCE OF DOZING
HOW LIKELY ARE YOU TO NOD OFF OR FALL ASLEEP WHEN YOU ARE A PASSENGER IN A CAR FOR AN HOUR WITHOUT A BREAK: WOULD NEVER DOZE
HOW LIKELY ARE YOU TO NOD OFF OR FALL ASLEEP WHILE SITTING QUIETLY AFTER LUNCH WITHOUT ALCOHOL: WOULD NEVER DOZE
HOW LIKELY ARE YOU TO NOD OFF OR FALL ASLEEP WHILE SITTING INACTIVE IN A PUBLIC PLACE: WOULD NEVER DOZE
HOW LIKELY ARE YOU TO NOD OFF OR FALL ASLEEP WHILE WATCHING TV: SLIGHT CHANCE OF DOZING
HOW LIKELY ARE YOU TO NOD OFF OR FALL ASLEEP IN A CAR, WHILE STOPPED FOR A FEW MINUTES IN TRAFFIC: WOULD NEVER DOZE

## 2024-08-22 NOTE — PROGRESS NOTES
Chief complaint: Needs reevaluation for sleep apnea    History of Present Illness: 68-year-old female with history of hypertension, type 2 diabetes and high sleep requirement.  She has had high sleep requirement since she was a child.  Currently she is retired and she tries to get 10 to 12 hours of sleep.  With that she denies symptoms of daytime sleepiness.  She did have traumatic brain injury a few years back and had even higher sleep requirement around that time.  If she cannot meet her sleep requirement she will have excessive sleepiness.  She was observed to have very loud disruptive snoring and apnea by her travel partner last year which led her to evaluation that culminated in a home sleep test that suggested severe sleep apnea.  She was started on CPAP.  The first night was a very good night.  She is felt she slept through the night and only needed 8 hours of sleep.  However ready shortly afterwards she had problems with the machine.  Then she got a respiratory infection that was treated with amoxicillin and went into anaphylactic shock.  She was then hospitalized.  She recalls being treated with oxygen and sleeping better.  Prior to the oxygen she was constantly setting off the alarms.  She worked with the medical supplier after she was discharged to get her machine fixed.  She was eventually told that the machine was faulty but she could not get a new one because she was outside the compliance window.    She still interested in having the severe obstructive sleep apnea treated.  However she needs a reevaluation.    Ola Sleepiness Scale  Total score - Ola: 4 (8/22/2024  9:40 AM)   (Less than 10 normal)    Insomnia Severity Scale  KERRI Total Score: 4  (normal 0-7, mild 8-14, moderate 15-21, severe 22-28)    Past Medical History:   Diagnosis Date    Acne     Allergic rhinitis     Gout     Hx of radiation therapy     Hyperlipemia     Idiopathic urticaria 10/31/2016    Irritable bowel syndrome      Malignant neoplasm of right breast (H) 8/25/2016    Migraine     Mild traumatic brain injury (H) 7/11/2017    Rosacea        Allergies   Allergen Reactions    Augmentin [Amoxicillin-Pot Clavulanate] Anaphylaxis    Other Food Allergy Itching and Swelling     Hazulnut    Blueberry [Vaccinium Angustifolium] Other (See Comments)    Codeine Unknown    Metformin Headache and GI Disturbance       Current Outpatient Medications   Medication Sig Dispense Refill    ACCU-CHEK GUIDE test strip Use to test blood sugar once daily. 100 strip 5    allopurinol (ZYLOPRIM) 100 MG tablet Take 1 tablet (100 mg) by mouth daily Please establish with a new primary care provider for further refills 90 tablet 0    ascorbic acid, vitamin C, (VITAMIN C) 1000 MG tablet [ASCORBIC ACID, VITAMIN C, (VITAMIN C) 1000 MG TABLET] Take 1,000 mg by mouth daily.      atorvastatin (LIPITOR) 40 MG tablet Take 1 tablet (40 mg) by mouth daily 90 tablet 3    blood glucose (NO BRAND SPECIFIED) test strip Use to test blood sugar once  daily or as directed. To accompany: Blood Glucose Monitor Brands: per insurance. 100 strip 6    blood glucose monitoring (NO BRAND SPECIFIED) meter device kit Use to test blood sugar once daily or as directed. Preferred blood glucose meter OR supplies to accompany: Blood Glucose Monitor Brands: per insurance. 1 kit 0    blood glucose monitoring (SOFTCLIX) lancets Use to test blood sugar once daily. 100 each 4    calcium-vitamin D (CALCIUM-VITAMIN D) 500 mg(1,250mg) -200 unit per tablet [CALCIUM-VITAMIN D (CALCIUM-VITAMIN D) 500 MG(1,250MG) -200 UNIT PER TABLET] Take 1 tablet by mouth daily.       escitalopram (LEXAPRO) 10 MG tablet Take 1 tablet (10 mg) by mouth daily 90 tablet 1    losartan (COZAAR) 100 MG tablet Take 1 tablet (100 mg) by mouth daily 90 tablet 3    metFORMIN (GLUCOPHAGE XR) 500 MG 24 hr tablet Take 1 tablet (500 mg) by mouth daily (with dinner) 90 tablet 1    thin (NO BRAND SPECIFIED) lancets Use one daily with  lanceting device. To accompany: Blood Glucose Monitor Brands: per insurance. 100 each 6    triamcinolone (KENALOG) 0.1 % external cream [TRIAMCINOLONE (KENALOG) 0.1 % CREAM] APPLY EXTERNALLY TO RASH DAILY AS NEEDED 45 g 3    vitamin D3 (CHOLECALCIFEROL) 25 mcg (1000 units) tablet Take 1 tablet (25 mcg) by mouth daily      sitagliptin (JANUVIA) 100 MG tablet Take 1 tablet (100 mg) by mouth daily (Patient not taking: Reported on 7/29/2024) 90 tablet 3     No current facility-administered medications for this visit.       Social History     Socioeconomic History    Marital status: Single     Spouse name: Not on file    Number of children: Not on file    Years of education: Not on file    Highest education level: Not on file   Occupational History    Not on file   Tobacco Use    Smoking status: Some Days     Current packs/day: 0.10     Average packs/day: 0.1 packs/day for 20.0 years (2.0 ttl pk-yrs)     Types: Cigarettes     Passive exposure: Never    Smokeless tobacco: Never   Vaping Use    Vaping status: Never Used   Substance and Sexual Activity    Alcohol use: Not Currently     Alcohol/week: 2.0 standard drinks of alcohol     Types: 2 Standard drinks or equivalent per week    Drug use: No    Sexual activity: Not Currently   Other Topics Concern    Not on file   Social History Narrative    She is single, lives alone, and does not have children.  She does not smoke cigarettes and occasionally has an alcoholic beverage. She has worked as independent  and part-time at [employer].  She became disabled in August 2016.     Social Determinants of Health     Financial Resource Strain: Low Risk  (3/24/2024)    [facility]    Financial Resource Strain     Difficulty of Paying Living Expenses: 3     Difficulty of Paying Living Expenses: Not on file   Food Insecurity: No Food Insecurity (3/24/2024)    Received from  "[facility], [facility]    Food Insecurity     Worried About Running Out of Food in the Last Year: 1   Transportation Needs: No Transportation Needs (3/24/2024)    Received from [facility], [facility]    Transportation Needs     Lack of Transportation (Medical): 1   Physical Activity: Not on file   Stress: Not on file   Social Connections: Socially Integrated (3/24/2024)    [facility] from [facility], [facility]    Social Connections     Frequency of Communication with Friends and Family: 0   Interpersonal Safety: Low Risk  (2024)    Interpersonal Safety     Do you feel physically and emotionally safe where you currently live?: Yes     Within the past 12 months, have you been hit, slapped, kicked or otherwise physically hurt by someone?: No     Within the past 12 months, have you been humiliated or emotionally abused in other ways by your partner or ex-partner?: No   Housing Stability: Low Risk  (3/24/2024)    [facility] from [facility], [facility]    Housing Stability     Unable to Pay for Housing in the Last Year: 1       Family History   Problem Relation Age of Onset    Breast Cancer Mother          age 46    Coronary Artery Disease Father          age 60    Other - See Comments Sister         benign brain tumor    Bipolar Disorder Sister     Kidney Cancer Sister     Coronary Artery Disease Sister          age 74    Snoring Sister     Multiple myeloma Brother         has amyloidosis as well    Cancer Brother         ? waldenstrom's    Kidney Disease Brother         had a transplant    No Known Problems Paternal Grandfather     Breast Cancer Maternal Aunt            EXAM:  /87   Pulse 89   Ht 1.778 m (5' 10\")   Wt 91.9 kg (202 " lb 8 oz)   LMP  (LMP Unknown)   SpO2 99%   BMI 29.06 kg/m    GENERAL: Alert and no distress  EYES: Eyes grossly normal to inspection.  No discharge or erythema, or obvious scleral/conjunctival abnormalities.  RESP: No audible wheeze, cough, or visible cyanosis.    SKIN: Visible skin clear. No significant rash, abnormal pigmentation or lesions.  NEURO: Cranial nerves grossly intact.  Mentation and speech appropriate for age.  PSYCH: Appropriate affect, tone, and pace of words       HSAT 1/31/2024  Weight 198 lbs BMI 28.8  AHI 33 Lowest O2 Sat 80%  Time with O2 Sat at or below 88% 46 minutes        TSH   Date Value Ref Range Status   07/21/2021 2.55 0.30 - 5.00 uIU/mL Final         ASSESSMENT:  68-year-old female with history of type 2 diabetes, traumatic brain injury, high sleep requirement.  She cannot meet the sleep requirement she developed 6 excessive daytime sleepiness.  Previous diagnostic testing suggested severe sleep apnea with an AHI of 33 and associated significant hypoxemia.  Treatment of severe obstructive sleep apnea is medically indicated.    PLAN:  Recommended ASAP repeat testing to confirm diagnosis of severe sleep apnea.  She will do this in the sleep lab next week as a there is a opening in our sleep lab.  I will be contacting her with results when they become available.  Will resume a chart a new trial of auto titrating CPAP based on those results.  She will need to meet compliance goals and follow-up with me within 31 to 90 days.  She will be enrolled in the sleep therapy management program for CPAP support.  She is agreeable to this plan.    25 minutes spent by me on the date of the encounter doing chart review, history and exam, documentation and further activities per the note    Jazmine Hagan M.D.  Pulmonary/Critical Care/Sleep Medicine    [facility]   Floor 1, [address]   [address] [place], [place] [address]. [place] [address]    Appointments: [phone]    The above note was dictated using voice recognition software and may include typographical errors. Please contact the author for any clarifications.

## 2024-08-28 ENCOUNTER — THERAPY VISIT (OUTPATIENT)
Dept: SLEEP MEDICINE | Facility: CLINIC | Age: 69
End: 2024-08-28
Payer: MEDICARE

## 2024-08-28 DIAGNOSIS — G47.33 OSA (OBSTRUCTIVE SLEEP APNEA): Primary | ICD-10-CM

## 2024-08-28 PROCEDURE — 95810 POLYSOM 6/> YRS 4/> PARAM: CPT | Performed by: INTERNAL MEDICINE

## 2024-08-30 LAB — SLPCOMP: NORMAL

## 2024-08-30 NOTE — PROCEDURES
SLEEP STUDY INTERPRETATION  DIAGNOSTIC POLYSOMNOGRAPHY REPORT      Patient: IVONNE LAWTON  YOB: 1955  Study Date: 8/28/2024  MRN: [id number]  Referring Provider: -  Ordering Provider: Jazmine Hagan MD    Indications for Polysomnography: The patient is a 68 year old Female who weighs 202 lbs Her BMI is 29.06 kg/m2-, Relevant medical history includes hypertension, type 2 diabetes, high sleep requirement and KRISTIE (HSAT 1/31/2024 AHI 33) with poor CPAP compliance. A diagnostic polysomnogram was performed to re-evaluate for sleep apnea.    Polysomnogram Data: A full night polysomnogram recorded the standard physiologic parameters including EEG, EOG, EMG, ECG, nasal and oral airflow. Respiratory parameters of chest and abdominal movements were recorded with respiratory inductance plethysmography. Oxygen saturation was recorded by pulse oximetry. Hypopnea scoring rule used: 1B 4%.    Sleep Architecture: Decreased sleep efficiency with increased wake after sleep onset, increased arousal index, and delay to REM sleep.  The total recording time of the polysomnogram was 444.9 minutes. The total sleep time was 325.0 minutes. Sleep latency was normal at 12.3 minutes without the use of a sleep aid. REM latency was 310.5 minutes. Arousal index was increased at 47.3 arousals per hour. Sleep efficiency was decreased at 73.0%. Wake after sleep onset was 107.5 minutes. The patient spent 26.2% of total sleep time in Stage N1, 52.3% in Stage N2, 8.6% in Stage N3, and 12.9% in REM. Time in REM supine was - minutes.    Respiration: Severe obstructive sleep apnea with associated hypoxemia.  Events ? The polysomnogram revealed a presence of 104 obstructive, 10 central, and 14 mixed apneas resulting in an apnea index of 23.6 events per hour. There were 109 obstructive hypopneas and - central hypopneas resulting in an obstructive hypopnea index of 20.1 and central hypopnea index of - events per hour. The combined  apnea/hypopnea index was 43.8 events per hour (central apnea/hypopnea index was 1.8 events per hour). The REM AHI was 40.0 events per hour. The supine AHI was 81.8 events per hour. The RERA index was 5.2 events per hour.  The RDI was 48.9 events per hour.  Snoring - was reported as loud.  Respiratory rate and pattern - notable for normal respiratory rate and pattern.  Sustained Sleep Associated Hypoventilation - Transcutaneous carbon dioxide monitoring was not used, however significant hypoventilation was not suggested by oximetry.  Sleep Associated Hypoxemia - (Greater than 5 minutes O2 sat at or below 88%) was present. Baseline oxygen saturation was 91.1%. Lowest oxygen saturation was 83.0%. Time spent less than or equal to 88% was 15.7 minutes. Time spent less than or equal to 89% was 57.3 minutes.    Movement Activity: No abnormal movement activity  Periodic Limb Activity - There were 9 PLMs during the entire study. The PLM index was 1.7 movements per hour. The PLM Arousal Index was 1.5 per hour.  REM EMG Activity - Excessive transient/sustained muscle activity was not present.  Nocturnal Behavior - Abnormal sleep related behaviors were not noted.  Bruxism - None apparent.    Cardiac Summary: Normal sinus rhythm and rates.  The average pulse rate was 67.5 bpm. The minimum pulse rate was 58.0 bpm while the maximum pulse rate was 84.0 bpm.  Arrhythmias were not noted.      Assessment:   Severe obstructive sleep apnea with AHI 43.8 events per hour and associated hypoxemia.  Decreased sleep efficiency with increased wake after sleep onset, increased arousal index, and delay to REM sleep.  No abnormal movement activity  Normal sinus rhythm and rates.    Recommendations:  Recommend repeat polysomnography with full night titration of positive airway pressure therapy for the control of sleep disordered breathing.  Alternatively, treatment could be empirically initiated with Auto?titrating PAP therapy with a range of 5  to 15 cmH2O. Recommend clinical follow up with sleep management team.  Advice regarding the risks of drowsy driving.  Suggest optimizing sleep schedule and avoiding sleep deprivation.  Pharmacologic therapy should be used for management of restless legs syndrome only if present and clinically indicated and not based on the presence of periodic limb movements alone.    Diagnostic Codes:   Obstructive Sleep Apnea G47.33  Sleep Hypoxemia G47.36         .   _____________________________________   Electronically Signed By: Jazmine Hagan MD  8/30/2024

## 2024-09-09 ENCOUNTER — DOCUMENTATION ONLY (OUTPATIENT)
Dept: SLEEP MEDICINE | Facility: CLINIC | Age: 69
End: 2024-09-09
Payer: MEDICARE

## 2024-09-09 DIAGNOSIS — G47.33 OSA (OBSTRUCTIVE SLEEP APNEA): Primary | ICD-10-CM

## 2024-09-09 NOTE — PROGRESS NOTES
Patient was offered choice of vendor and chose [facility].  Patient Dorita Stringer was set up at [facility] on September 9, 2024. Patient received a Resmed Airsense 11 Pressures were set at  5-15 cm H2O.   Patient s ramp is OFF cm H2O for Off and FLEX/EPR is 2.  Patient received a [employer] Resp[employer]ics Mask name: Dream Wisp  Nasal mask size Standard, heated tubing and heated humidifier.  Patient has the following compliance requirements: using and visit requirements  Patient has a follow up on 11/15/24 with Dr. Hagan.    Althea Hardy

## 2024-09-12 ENCOUNTER — DOCUMENTATION ONLY (OUTPATIENT)
Dept: SLEEP MEDICINE | Facility: CLINIC | Age: 69
End: 2024-09-12
Payer: MEDICARE

## 2024-09-13 ENCOUNTER — TELEPHONE (OUTPATIENT)
Dept: SLEEP MEDICINE | Facility: CLINIC | Age: 69
End: 2024-09-13
Payer: MEDICARE

## 2024-09-13 NOTE — TELEPHONE ENCOUNTER
LVM FOR PT THAT IN THE AUTO SETTING IT WILL DO THIS AS THE MACHINE IS DETERMINING HOW MUCH HUMIDITY SHE NEEDS AND SOMETIMES SHE DOESN'T NEED MUCH. TOLD HER SHE CAN PUT IT ON MANUAL AND THEN THE LEVEL WILL GO DOWN THE SAME AMT EACH NIGHT. REVIEWED A D/L AND THERE ARE NO HUMIDIFIER FAULTS OR ANYTHING LIKE THAT.

## 2024-09-25 ENCOUNTER — DOCUMENTATION ONLY (OUTPATIENT)
Dept: SLEEP MEDICINE | Facility: CLINIC | Age: 69
End: 2024-09-25
Payer: MEDICARE

## 2024-09-25 NOTE — PROGRESS NOTES
14 day Sleep therapy management telephone visit    Diagnostic AHI:   PS.8   HST: 33        LEFT VOICE MESSAGE FOR PATIENT TO RETURN CALL      Objective measures: 14 day rolling measures   COMPLIANCE LEAK AHI AVERAGE USE IN MINUTES   92 % 14.72 5.88 353   GOAL >70% GOAL < 24 LPM GOAL <5 GOAL >240          Device settings:  CPAP MIN CPAP MAX EPR RESMED SOFT RESPONSE SETTING   5.0 cm  H20 15.0 cm  H20 TWO OFF         Patient has the following upcoming sleep appts:  Future Sleep Appointments         Provider Department    11/15/2024 1:30 PM (Arrive by 1:15 PM) Jazmine Hagan MD [facility] [place]            Replacement device: No  STM ordered by provider: Yes     Total time spent on accessing and  interpreting remote patient PAP therapy data  10 minutes    Total time spent counseling, coaching  and reviewing PAP therapy data with patient  0 minutes

## 2024-09-29 ENCOUNTER — HEALTH MAINTENANCE LETTER (OUTPATIENT)
Age: 69
End: 2024-09-29

## 2024-10-09 ENCOUNTER — ANCILLARY PROCEDURE (OUTPATIENT)
Dept: MAMMOGRAPHY | Facility: CLINIC | Age: 69
End: 2024-10-09
Payer: MEDICARE

## 2024-10-09 DIAGNOSIS — Z12.31 ENCOUNTER FOR SCREENING MAMMOGRAM FOR BREAST CANCER: ICD-10-CM

## 2024-10-09 PROCEDURE — 77067 SCR MAMMO BI INCL CAD: CPT | Mod: TC | Performed by: RADIOLOGY

## 2024-10-09 PROCEDURE — 77063 BREAST TOMOSYNTHESIS BI: CPT | Mod: TC | Performed by: RADIOLOGY

## 2024-10-10 ENCOUNTER — DOCUMENTATION ONLY (OUTPATIENT)
Dept: SLEEP MEDICINE | Facility: CLINIC | Age: 69
End: 2024-10-10
Payer: MEDICARE

## 2024-10-10 NOTE — PROGRESS NOTES
30 day Sleep therapy management telephone visit    Diagnostic AHI:   PS.8   HST: 33        LEFT VOICE MESSAGE FOR PATIENT TO RETURN CALL      Objective measures: 14 day rolling measures   COMPLIANCE LEAK AHI AVERAGE USE IN MINUTES   92 % 18.72 8.12 330   GOAL >70% GOAL < 24 LPM GOAL <5 GOAL >240          Device settings:  CPAP MIN CPAP MAX EPR RESMED SOFT RESPONSE SETTING   5.0 cm  H20 15.0 cm  H20 TWO OFF         Patient has the following upcoming sleep appts:  Future Sleep Appointments         Provider Department    11/15/2024 1:30 PM (Arrive by 1:15 PM) Jazmine Hagan MD [facility] [place]            Replacement device: No  STM ordered by provider: Yes     Total time spent on accessing and  interpreting remote patient PAP therapy data  10 minutes    Total time spent counseling, coaching  and reviewing PAP therapy data with patient  0 minutes

## 2024-10-14 ENCOUNTER — LAB (OUTPATIENT)
Dept: LAB | Facility: CLINIC | Age: 69
End: 2024-10-14
Payer: MEDICARE

## 2024-10-14 ENCOUNTER — ALLIED HEALTH/NURSE VISIT (OUTPATIENT)
Dept: EDUCATION SERVICES | Facility: CLINIC | Age: 69
End: 2024-10-14
Payer: MEDICARE

## 2024-10-14 VITALS — WEIGHT: 204.1 LBS | BODY MASS INDEX: 29.29 KG/M2

## 2024-10-14 DIAGNOSIS — E11.9 DIABETES MELLITUS, TYPE 2 (H): ICD-10-CM

## 2024-10-14 DIAGNOSIS — E11.9 DIABETES MELLITUS, TYPE 2 (H): Primary | ICD-10-CM

## 2024-10-14 LAB
EST. AVERAGE GLUCOSE BLD GHB EST-MCNC: 203 MG/DL
HBA1C MFR BLD: 8.7 % (ref 0–5.6)

## 2024-10-14 PROCEDURE — 36415 COLL VENOUS BLD VENIPUNCTURE: CPT

## 2024-10-14 PROCEDURE — 83036 HEMOGLOBIN GLYCOSYLATED A1C: CPT

## 2024-10-14 PROCEDURE — G0108 DIAB MANAGE TRN  PER INDIV: HCPCS

## 2024-10-14 RX ORDER — GLIPIZIDE 2.5 MG/1
2.5 TABLET, EXTENDED RELEASE ORAL DAILY
Qty: 90 TABLET | Refills: 0 | Status: SHIPPED | OUTPATIENT
Start: 2024-10-14 | End: 2024-10-29

## 2024-10-14 RX ORDER — GLIPIZIDE 2.5 MG/1
2.5 TABLET, EXTENDED RELEASE ORAL DAILY
Qty: 30 TABLET | Refills: 2 | Status: SHIPPED | OUTPATIENT
Start: 2024-10-14 | End: 2024-10-14

## 2024-10-14 NOTE — PROGRESS NOTES
.Diabetes Self-Management Education & Support    Presents for: Individual review    Type of Service: In Person Visit      ASSESSMENT:  Dorita is a very pleasant 68-year-old who returns to clinic today to review blood glucose, medications, recheck A1c and to assess/assist her with her current diabetes self-management skills with her current A1c not meeting ADA goal.  She arrived today unaccompanied and had a sheet with her blood glucose readings on it with her.  Medications were reviewed and she reported she had been unable to tolerate the metformin after trying to take it for the second time.  So at this time she is using no diabetes medications as adjunct therapy.  Prior to our visit today we did recheck her A1c and unfortunately it came back higher at 8.7% from a previous 8.1%.  Dorita did seem somewhat deflated with this result and stated she just could not understand how, with making changes to her diet, things were not appearing to get any better.  I did remind her that diabetes is a progressive disease and based upon her A1c and blood glucose readings, it was clear that she was going to be unable to manage her glycemic levels with her diet and activity level alone at this point and was in need of an adjunct therapy.  Being that she did not check with [facility] about co-pays for other medications as I had suggested at her last visit, I felt an appropriate alternative therapy today would be a low-dose of glipizide to address her postprandial elevations in the evening.  I provided her education on the mechanism of action of glipizide, as well as dosing and possible side effects.  We will reassess her blood glucose readings in early November.  Dorita told me today she had recently started using a CPAP machine and things were not going so well.  She was having a difficult time using it as it was waking her several times during the night.  She does have a follow-up appointment with the sleep center in  November.  I will meet with Dorita again when her next A1c is due and I instructed her to call with any questions or concerns that come up before that time.    Patient's most recent   Lab Results   Component Value Date    A1C 8.7 10/14/2024     is not meeting goal of  7-7.5%    Diabetes knowledge and skills assessment:   Patient is knowledgeable in diabetes management concepts related to: Healthy Eating, Being Active, Monitoring, and Reducing Risks    Continue education with the following diabetes management concepts: Healthy Eating, Being Active, Taking Medication, Reducing Risks, and Healthy Coping    Based on learning assessment above, most appropriate setting for further diabetes education would be: Individual setting.      PLAN  1. Eat 3 balanced meals each day - Monitor carb intake and limit to 45-60 grams per meal  This would be equal to 3-4 choices ~  1 choice = 15 grams    Do not wait longer than 4-5 hours to eat something  Snacks limit to no more than 30 grams of carbohydrates or 2 choices  Make sure you include protein source with each meal and at bedtime - this has been shown to help with blood glucose elevations    2.  Check blood sugars once each day - please try and alternate the times you check between am fasting( right after you wake before eating) and 2 hours AFTER dinner - this allows us to get a better idea of what is happening throughout your day , not at just one time     Fasting and before meal target is 80 - 130   2 hours after a meal target is < 180  remember to bring meter and log book to all appointments    3. Incorporate 30 minutes activity into each day - does not need to be all at one time & walking counts    4. Take diabetes medications as prescribed going to start a low-dose of glipizide extended release.  Instructed patient to take with lunch each day.  Will check in with her on 11/4 to reassess blood glucose and adjust dose if necessary    Topics to cover at upcoming visits: Healthy  "Eating, Being Active, Taking Medication, Problem Solving, Reducing Risks, and Healthy Coping    Follow-up: 1/20/2025    See Care Plan for co-developed, patient-state behavior change goals.  AVS provided for patient today.    Education Materials Provided:  Medication Information on glipizide      SUBJECTIVE/OBJECTIVE:  Presents for: Individual review  Accompanied by: Self  Diabetes education in the past 24 mo: Yes (LV 7/29/24)  Focus of Visit: Monitoring, Taking Medication, Healthy Coping, Healthy Eating, Assistance w/ making life changes, Self-care behavioral goal setting, Being Active  Diabetes type: Type 2  Date of diagnosis: 2022  Disease course: Getting harder to manage  Transportation concerns: No  Difficulty affording diabetes medication?: Sometimes  Other concerns:: None (TBI in 2016)  Cultural Influences/Ethnic Background:  Not  or       Diabetes Symptoms & Complications:  Weight trend: Stable  Symptom course: Stable  Disease course: Getting harder to manage  Complications assessed today?: No    Patient Problem List and Family Medical History reviewed for relevant medical history, current medical status, and diabetes risk factors.    Vitals:  Wt 92.6 kg (204 lb 1.6 oz)   LMP  (LMP Unknown)   BMI 29.29 kg/m    Estimated body mass index is 29.29 kg/m  as calculated from the following:    Height as of 8/22/24: 1.778 m (5' 10\").    Weight as of this encounter: 92.6 kg (204 lb 1.6 oz).   Last 3 BP:   BP Readings from Last 3 Encounters:   08/22/24 124/87   07/17/24 124/78   03/27/24 139/77       History   Smoking Status    Some Days    Packs/day: 0.10    Years: 20.00    Types: Cigarettes   Smokeless Tobacco    Never       Labs:  Lab Results   Component Value Date    A1C 8.7 10/14/2024     Lab Results   Component Value Date     01/03/2024     06/03/2022     Lab Results   Component Value Date    LDL  07/17/2024      Comment:      Cannot estimate LDL when triglyceride exceeds 400 mg/dL " "    07/17/2024     06/03/2022     Direct Measure HDL   Date Value Ref Range Status   07/17/2024 47 (L) >=50 mg/dL Final   ]  GFR Estimate   Date Value Ref Range Status   01/03/2024 73 >60 mL/min/1.73m2 Final   07/16/2020 >60 >60 mL/min/1.73m2 Final     GFR Estimate If Black   Date Value Ref Range Status   07/16/2020 >60 >60 mL/min/1.73m2 Final     Lab Results   Component Value Date    CR 0.86 01/03/2024     No results found for: \"MICROALBUMIN\"    Healthy Eating:  Healthy Eating Assessed Today: Yes  Cultural/Orthodox diet restrictions?: No  Do you have any food allergies or intolerances?: Yes  Please list your food allergies / intolerances: blueberry, hazelnut, celery, tomatoes, legumes - recently dx with oral allergy syndrome ( pollens in air react with certain foods)  Meal planning/habits: Smaller portions, Avoiding sweets, Low carb  Who cooks/prepares meals for you?: Self, Other  Who purchases food in  your home?: Self  How many times a week on average do you eat food made away from home (restaurant/take-out)?: 3  Meals include: Breakfast, Dinner, Afternoon Snack  Breakfast: 10-11 am: will have coffee with crustless quiche or hard boiled eggs or a small bowl of cereal with raspberries or blackberries  Lunch: 12-1 pm: frequently will go out to lunch with her sister where they will always split a meal- if home might be soup or a sandwich  Dinner: 5-6 pm: + protein (often chix or hamberger 4-6 oz) vegable and fresh fruit  Snacks: during day - nuts or [place] granola bar ( 20 CHO)  evenings  2 slices cheese or nuts  Other: no white breads, little to no sweets  Beverages: Water, Coffee, Milk, Soda, Other (see Comments)  Please elaborate::  emeterio lemonade ( 5-8 CHO)  Favorite day - carb beverage  Has patient met with a dietitian in the past?: No    Being Active:  Being Active Assessed Today: Yes  Exercise:: Yes (swims at [facility] 2 x/wk 60 min and walks on other days 15-30 minutes)  Days per week of " moderate to strenuous exercise (like a brisk walk): 4  On average, minutes per day of exercise at this level: 30  How intense was your typical exercise? : Light (like stretching or slow walking)  Exercise Minutes per Week: 120  Barrier to exercise: None    Monitoring:  Monitoring Assessed Today: Yes  Did patient bring glucose meter to appointment? : No (logbook)  Blood Glucose Meter: Accu-chek  Times checking blood sugar at home (number): 1  Times checking blood sugar at home (per): Day  Blood glucose trend: Fluctuating    The following are Dorita's most recent blood glucose readings taken from the log sheet today she had with her today.    FB, 209, 218, 214, 219, 216, 171, 183, 159, 207, 183, 178, 159, 167  2-hour postmeal: 237, 288, 259, 214, 167, 296, 214, 220, 208, 237, 229    These readings were reviewed and discussed with Dorita during our visit today.  Unfortunately she was unable to tolerate the metformin when we retried it again at her last visit and she did not follow-up with [facility] on co-pays for medications that had been requested.  She did say she would be meeting with her  later on this month to go over enrollment plans for next year.  I did strongly recommend that she bring with her the list of medications I provided for her today (GLP-1's and SGLT2's) to inquire about co-pays.  As for now, I will start her on a low-dose of glipizide extended release once daily.  I will follow-up with her again in approximately 2 weeks to review her blood glucose and make adjustments to the medication if needed.  Feel she would greatly benefit from the addition of a GLP-1 as it has effects on both pre and postprandial glucose.    Taking Medications:  Diabetes Medication(s)       Dipeptidyl Peptidase-4 (DPP-4) Inhibitors       sitagliptin (JANUVIA) 100 MG tablet Take 1 tablet (100 mg) by mouth daily     Patient not taking: Reported on 2024       Sulfonylureas       glipiZIDE  (GLUCOTROL XL) 2.5 MG 24 hr tablet Take 1 tablet (2.5 mg) by mouth daily.            Taking Medication Assessed Today: Yes  Current Treatments: Diet  Diabetes medication side effects?: Yes (was unable to tolerate the Metformin we tried again at last visit)    Problem Solving:  Problem Solving Assessed Today: Yes  Is the patient at risk for hypoglycemia?: No  Is the patient at risk for DKA?: No  Does patient have severe weather/disaster plan for diabetes management?: Not Needed  Does patient have sick day plan for diabetes management?: Not Needed              Reducing Risks:  Reducing Risks Assessed Today: Yes  Diabetes Risks: Age over 45 years, Sedentary Lifestyle  CAD Risks: Post-menopausal, Sedentary lifestyle, Hypertension, Diabetes Mellitus, Family history  Has dilated eye exam at least once a year?: Yes  Sees dentist every 6 months?: Yes  Feet checked by healthcare provider in the last year?: Yes    Healthy Coping:  Healthy Coping Assessed Today: Yes  Emotional response to diabetes: Ready to learn  Informal Support system:: Family  Stage of change: ACTION (Actively working towards change)  Support resources: In-person Offerings  Patient Activation Measure Survey Score:       No data to display                  Care Plan and Education Provided:  Healthy Eating: Balanced meals, Consistency in amount and timing of carbohydrate intake, Eating out, Label reading, and Portion control, Being Active: Amount recommended (150 minutes moderate or 75 minutes vigorous activity and 2-3 days strength training per week), Finding a physical activity routine that works for you, and Relationship of activity to glucose, Monitoring: Frequency of monitoring and Individual glucose targets, Taking Medication: Action of prescribed medication(s), Side effects of prescribed medication(s), and When to take medication(s), Problem Solving: High glucose - causes, signs/symptoms, treatment and prevention, Low glucose - causes, signs/symptoms,  treatment and prevention, and Rule of 15 and carrying a carbohydrate source at all times in case of low glucose, Reducing Risks: Complications of diabetes, Goal for A1c, how it relates to glucose and how often to check, Preventing cardiovascular disease, including blood pressure goals, lipid goals, recommendations for cardioprotective medications, statins, and aspirin, Prevention, early diagnostic measures and treatment of complications, and A1C - goals, relating to blood glucose levels, how often to check, and Healthy Coping: Benefits of making appropriate lifestyle changes, Identifying helpful resources, Recognize feelings about diagnosis, and Utilize support systems    Thank you,  Sandra Hill RN [facility]  Certified Diabetes Care and   Visit type : DSMT     Time Spent: 60 minutes  Encounter Type: Individual    Any diabetes medication dose changes were made via the CDE Protocol per the patient's referring provider and primary care provider. A copy of this encounter was shared with the provider.   Much or all of the text in this note was generated through the use of the Dragon Dictate voice-to-text software.Errors in spelling or words which seem out of context are unintentional. Sound alike errors, in particular, may have escaped editing.      Thank you for coming in to see me today !      I value your experience and would be very thankful for your time in providing feedback in our clinic survey.    You may receive an e-mail, text message or even something in the mail from [employer].  This is a survey to let us know how we are doing - the survey will be related to your diabetes education and me.

## 2024-10-14 NOTE — PATIENT INSTRUCTIONS
1. Eat 3 balanced meals each day - Monitor carb intake and limit to 45-60 grams per meal  This would be equal to 3-4 choices ~  1 choice = 15 grams    Do not wait longer than 4-5 hours to eat something  Snacks limit to no more than 30 grams of carbohydrates or 2 choices  Make sure you include protein source with each meal and at bedtime - this has been shown to help with blood glucose elevations    2. Check blood sugars once each day - please try and alternate the times you check between am fasting( right after you wake before eating) and 2 hours AFTER dinner - this allows us to get a better idea of what is happening throughout your day , not at just one time     Fasting and before meal target is 80 - 130   2 hours after a meal target is < 180  remember to bring meter and log book to all appointments    3. Incorporate 30 minutes activity into each day - does not need to be all at one time & walking counts    4. Take diabetes medications as prescribed we are starting you on a new medication called Glipizide - starting you on a very small dose of 2.5 mg once daily at lunchtime    I am going to send you a message through My Chart ( I put it in for 11/4) so I can take a look at your numbers after having started the Glipizide - the numbers I am most concerned with are you evening ones - so if you could make sure you are still getting a combination of both morning and evening, that would be great !!!    A1c today was 8.7%    When you resign up for your medicare benefits- look into the cost of these medications:  Mounjaro, Ozempic, Trulicity, Rybelsus and Jardiance      Thank you for coming in to see me today !      I value your experience and would be very thankful for your time in providing feedback in our clinic survey.    You may receive an e-mail, text message or even something in the mail from [employer].  This is a survey to let us know how we are doing - the survey will be related to your diabetes education and  me.

## 2024-10-14 NOTE — LETTER
10/14/2024         RE: Dorita Stringer  [address]  [place] [address]        Dear Colleague,    Thank you for referring your patient, Dorita Stringer, to the [facility]. Please see a copy of my visit note below.    .Diabetes Self-Management Education & Support    Presents for: Individual review    Type of Service: In Person Visit      ASSESSMENT:  Dorita is a very pleasant 68-year-old who returns to clinic today to review blood glucose, medications, recheck A1c and to assess/assist her with her current diabetes self-management skills with her current A1c not meeting ADA goal.  She arrived today unaccompanied and had a sheet with her blood glucose readings on it with her.  Medications were reviewed and she reported she had been unable to tolerate the metformin after trying to take it for the second time.  So at this time she is using no diabetes medications as adjunct therapy.  Prior to our visit today we did recheck her A1c and unfortunately it came back higher at 8.7% from a previous 8.1%.  Dorita did seem somewhat deflated with this result and stated she just could not understand how, with making changes to her diet, things were not appearing to get any better.  I did remind her that diabetes is a progressive disease and based upon her A1c and blood glucose readings, it was clear that she was going to be unable to manage her glycemic levels with her diet and activity level alone at this point and was in need of an adjunct therapy.  Being that she did not check with [facility] about co-pays for other medications as I had suggested at her last visit, I felt an appropriate alternative therapy today would be a low-dose of glipizide to address her postprandial elevations in the evening.  I provided her education on the mechanism of action of glipizide, as well as dosing and possible side effects.  We will reassess her blood glucose readings in early  November.  Dorita told me today she had recently started using a CPAP machine and things were not going so well.  She was having a difficult time using it as it was waking her several times during the night.  She does have a follow-up appointment with the sleep center in November.  I will meet with Dorita again when her next A1c is due and I instructed her to call with any questions or concerns that come up before that time.    Patient's most recent   Lab Results   Component Value Date    A1C 8.7 10/14/2024     is not meeting goal of  7-7.5%    Diabetes knowledge and skills assessment:   Patient is knowledgeable in diabetes management concepts related to: Healthy Eating, Being Active, Monitoring, and Reducing Risks    Continue education with the following diabetes management concepts: Healthy Eating, Being Active, Taking Medication, Reducing Risks, and Healthy Coping    Based on learning assessment above, most appropriate setting for further diabetes education would be: Individual setting.      PLAN  1. Eat 3 balanced meals each day - Monitor carb intake and limit to 45-60 grams per meal  This would be equal to 3-4 choices ~  1 choice = 15 grams    Do not wait longer than 4-5 hours to eat something  Snacks limit to no more than 30 grams of carbohydrates or 2 choices  Make sure you include protein source with each meal and at bedtime - this has been shown to help with blood glucose elevations    2.  Check blood sugars once each day - please try and alternate the times you check between am fasting( right after you wake before eating) and 2 hours AFTER dinner - this allows us to get a better idea of what is happening throughout your day , not at just one time     Fasting and before meal target is 80 - 130   2 hours after a meal target is < 180  remember to bring meter and log book to all appointments    3. Incorporate 30 minutes activity into each day - does not need to be all at one time & walking counts    4. Take  "diabetes medications as prescribed going to start a low-dose of glipizide extended release.  Instructed patient to take with lunch each day.  Will check in with her on 11/4 to reassess blood glucose and adjust dose if necessary    Topics to cover at upcoming visits: Healthy Eating, Being Active, Taking Medication, Problem Solving, Reducing Risks, and Healthy Coping    Follow-up: 1/20/2025    See Care Plan for co-developed, patient-state behavior change goals.  AVS provided for patient today.    Education Materials Provided:  Medication Information on glipizide      SUBJECTIVE/OBJECTIVE:  Presents for: Individual review  Accompanied by: Self  Diabetes education in the past 24 mo: Yes (LV 7/29/24)  Focus of Visit: Monitoring, Taking Medication, Healthy Coping, Healthy Eating, Assistance w/ making life changes, Self-care behavioral goal setting, Being Active  Diabetes type: Type 2  Date of diagnosis: 2022  Disease course: Getting harder to manage  Transportation concerns: No  Difficulty affording diabetes medication?: Sometimes  Other concerns:: None (TBI in 2016)  Cultural Influences/Ethnic Background:  Not  or       Diabetes Symptoms & Complications:  Weight trend: Stable  Symptom course: Stable  Disease course: Getting harder to manage  Complications assessed today?: No    Patient Problem List and Family Medical History reviewed for relevant medical history, current medical status, and diabetes risk factors.    Vitals:  Wt 92.6 kg (204 lb 1.6 oz)   LMP  (LMP Unknown)   BMI 29.29 kg/m    Estimated body mass index is 29.29 kg/m  as calculated from the following:    Height as of 8/22/24: 1.778 m (5' 10\").    Weight as of this encounter: 92.6 kg (204 lb 1.6 oz).   Last 3 BP:   BP Readings from Last 3 Encounters:   08/22/24 124/87   07/17/24 124/78   03/27/24 139/77       History   Smoking Status     Some Days     Packs/day: 0.10     Years: 20.00     Types: Cigarettes   Smokeless Tobacco     Never " "      Labs:  Lab Results   Component Value Date    A1C 8.7 10/14/2024     Lab Results   Component Value Date     01/03/2024     06/03/2022     Lab Results   Component Value Date    LDL  07/17/2024      Comment:      Cannot estimate LDL when triglyceride exceeds 400 mg/dL     07/17/2024     06/03/2022     Direct Measure HDL   Date Value Ref Range Status   07/17/2024 47 (L) >=50 mg/dL Final   ]  GFR Estimate   Date Value Ref Range Status   01/03/2024 73 >60 mL/min/1.73m2 Final   07/16/2020 >60 >60 mL/min/1.73m2 Final     GFR Estimate If Black   Date Value Ref Range Status   07/16/2020 >60 >60 mL/min/1.73m2 Final     Lab Results   Component Value Date    CR 0.86 01/03/2024     No results found for: \"MICROALBUMIN\"    Healthy Eating:  Healthy Eating Assessed Today: Yes  Cultural/Christian diet restrictions?: No  Do you have any food allergies or intolerances?: Yes  Please list your food allergies / intolerances: blueberry, hazelnut, celery, tomatoes, legumes - recently dx with oral allergy syndrome ( pollens in air react with certain foods)  Meal planning/habits: Smaller portions, Avoiding sweets, Low carb  Who cooks/prepares meals for you?: Self, Other  Who purchases food in  your home?: Self  How many times a week on average do you eat food made away from home (restaurant/take-out)?: 3  Meals include: Breakfast, Dinner, Afternoon Snack  Breakfast: 10-11 am: will have coffee with crustless quiche or hard boiled eggs or a small bowl of cereal with raspberries or blackberries  Lunch: 12-1 pm: frequently will go out to lunch with her sister where they will always split a meal- if home might be soup or a sandwich  Dinner: 5-6 pm: + protein (often chix or hamberger 4-6 oz) vegable and fresh fruit  Snacks: during day - nuts or [place] granola bar ( 20 CHO)  evenings  2 slices cheese or nuts  Other: no white breads, little to no sweets  Beverages: Water, Coffee, Milk, Soda, Other (see " Comments)  Please elaborate::  emeterio lemonade ( 5-8 CHO)  Favorite day - carb beverage  Has patient met with a dietitian in the past?: No    Being Active:  Being Active Assessed Today: Yes  Exercise:: Yes (swims at [facility] 2 x/wk 60 min and walks on other days 15-30 minutes)  Days per week of moderate to strenuous exercise (like a brisk walk): 4  On average, minutes per day of exercise at this level: 30  How intense was your typical exercise? : Light (like stretching or slow walking)  Exercise Minutes per Week: 120  Barrier to exercise: None    Monitoring:  Monitoring Assessed Today: Yes  Did patient bring glucose meter to appointment? : No (logbook)  Blood Glucose Meter: Accu-chek  Times checking blood sugar at home (number): 1  Times checking blood sugar at home (per): Day  Blood glucose trend: Fluctuating    The following are Dorita's most recent blood glucose readings taken from the log sheet today she had with her today.    FB, 209, 218, 214, 219, 216, 171, 183, 159, 207, 183, 178, 159, 167  2-hour postmeal: 237, 288, 259, 214, 167, 296, 214, 220, 208, 237, 229    These readings were reviewed and discussed with Dorita during our visit today.  Unfortunately she was unable to tolerate the metformin when we retried it again at her last visit and she did not follow-up with [facility] on co-pays for medications that had been requested.  She did say she would be meeting with her  later on this month to go over enrollment plans for next year.  I did strongly recommend that she bring with her the list of medications I provided for her today (GLP-1's and SGLT2's) to inquire about co-pays.  As for now, I will start her on a low-dose of glipizide extended release once daily.  I will follow-up with her again in approximately 2 weeks to review her blood glucose and make adjustments to the medication if needed.  Feel she would greatly benefit from the addition of a GLP-1 as it has effects on  both pre and postprandial glucose.    Taking Medications:  Diabetes Medication(s)       Dipeptidyl Peptidase-4 (DPP-4) Inhibitors       sitagliptin (JANUVIA) 100 MG tablet Take 1 tablet (100 mg) by mouth daily     Patient not taking: Reported on 7/29/2024       Sulfonylureas       glipiZIDE (GLUCOTROL XL) 2.5 MG 24 hr tablet Take 1 tablet (2.5 mg) by mouth daily.            Taking Medication Assessed Today: Yes  Current Treatments: Diet  Diabetes medication side effects?: Yes (was unable to tolerate the Metformin we tried again at last visit)    Problem Solving:  Problem Solving Assessed Today: Yes  Is the patient at risk for hypoglycemia?: No  Is the patient at risk for DKA?: No  Does patient have severe weather/disaster plan for diabetes management?: Not Needed  Does patient have sick day plan for diabetes management?: Not Needed              Reducing Risks:  Reducing Risks Assessed Today: Yes  Diabetes Risks: Age over 45 years, Sedentary Lifestyle  CAD Risks: Post-menopausal, Sedentary lifestyle, Hypertension, Diabetes Mellitus, Family history  Has dilated eye exam at least once a year?: Yes  Sees dentist every 6 months?: Yes  Feet checked by healthcare provider in the last year?: Yes    Healthy Coping:  Healthy Coping Assessed Today: Yes  Emotional response to diabetes: Ready to learn  Informal Support system:: Family  Stage of change: ACTION (Actively working towards change)  Support resources: In-person Offerings  Patient Activation Measure Survey Score:       No data to display                  Care Plan and Education Provided:  Healthy Eating: Balanced meals, Consistency in amount and timing of carbohydrate intake, Eating out, Label reading, and Portion control, Being Active: Amount recommended (150 minutes moderate or 75 minutes vigorous activity and 2-3 days strength training per week), Finding a physical activity routine that works for you, and Relationship of activity to glucose, Monitoring: Frequency of  monitoring and Individual glucose targets, Taking Medication: Action of prescribed medication(s), Side effects of prescribed medication(s), and When to take medication(s), Problem Solving: High glucose - causes, signs/symptoms, treatment and prevention, Low glucose - causes, signs/symptoms, treatment and prevention, and Rule of 15 and carrying a carbohydrate source at all times in case of low glucose, Reducing Risks: Complications of diabetes, Goal for A1c, how it relates to glucose and how often to check, Preventing cardiovascular disease, including blood pressure goals, lipid goals, recommendations for cardioprotective medications, statins, and aspirin, Prevention, early diagnostic measures and treatment of complications, and A1C - goals, relating to blood glucose levels, how often to check, and Healthy Coping: Benefits of making appropriate lifestyle changes, Identifying helpful resources, Recognize feelings about diagnosis, and Utilize support systems    Thank you,  Sandra Hill RN CDCES  Certified Diabetes Care and   Visit type : DSMT     Time Spent: 60 minutes  Encounter Type: Individual    Any diabetes medication dose changes were made via the CDE Protocol per the patient's referring provider and primary care provider. A copy of this encounter was shared with the provider.   Much or all of the text in this note was generated through the use of the Dragon Dictate voice-to-text software.Errors in spelling or words which seem out of context are unintentional. Sound alike errors, in particular, may have escaped editing.      Thank you for coming in to see me today !      I value your experience and would be very thankful for your time in providing feedback in our clinic survey.    You may receive an e-mail, text message or even something in the mail from [employer].  This is a survey to let us know how we are doing - the survey will be related to your diabetes education and me.

## 2024-10-29 ENCOUNTER — OFFICE VISIT (OUTPATIENT)
Dept: INTERNAL MEDICINE | Facility: CLINIC | Age: 69
End: 2024-10-29
Payer: MEDICARE

## 2024-10-29 VITALS
SYSTOLIC BLOOD PRESSURE: 134 MMHG | BODY MASS INDEX: 29.41 KG/M2 | HEIGHT: 70 IN | HEART RATE: 73 BPM | WEIGHT: 205.4 LBS | TEMPERATURE: 98.4 F | RESPIRATION RATE: 15 BRPM | OXYGEN SATURATION: 99 % | DIASTOLIC BLOOD PRESSURE: 86 MMHG

## 2024-10-29 DIAGNOSIS — F41.9 ANXIETY: ICD-10-CM

## 2024-10-29 DIAGNOSIS — K58.9 IRRITABLE BOWEL SYNDROME, UNSPECIFIED TYPE: ICD-10-CM

## 2024-10-29 DIAGNOSIS — S06.9X9A: ICD-10-CM

## 2024-10-29 DIAGNOSIS — E11.9 TYPE 2 DIABETES MELLITUS WITHOUT COMPLICATION, UNSPECIFIED WHETHER LONG TERM INSULIN USE (H): ICD-10-CM

## 2024-10-29 DIAGNOSIS — H90.8 MIXED CONDUCTIVE AND SENSORINEURAL HEARING LOSS, UNSPECIFIED LATERALITY: Primary | ICD-10-CM

## 2024-10-29 DIAGNOSIS — Z71.6 ENCOUNTER FOR TOBACCO USE CESSATION COUNSELING: ICD-10-CM

## 2024-10-29 DIAGNOSIS — M81.0 AGE-RELATED OSTEOPOROSIS WITHOUT CURRENT PATHOLOGICAL FRACTURE: ICD-10-CM

## 2024-10-29 DIAGNOSIS — E11.69 TYPE 2 DIABETES MELLITUS WITH OTHER SPECIFIED COMPLICATION, WITHOUT LONG-TERM CURRENT USE OF INSULIN (H): ICD-10-CM

## 2024-10-29 DIAGNOSIS — Z23 NEED FOR SHINGLES VACCINE: ICD-10-CM

## 2024-10-29 DIAGNOSIS — E78.2 MIXED HYPERLIPIDEMIA: ICD-10-CM

## 2024-10-29 DIAGNOSIS — M85.859 OSTEOPENIA OF NECK OF FEMUR, UNSPECIFIED LATERALITY: ICD-10-CM

## 2024-10-29 DIAGNOSIS — M10.9 GOUT, UNSPECIFIED CAUSE, UNSPECIFIED CHRONICITY, UNSPECIFIED SITE: Chronic | ICD-10-CM

## 2024-10-29 DIAGNOSIS — Z12.11 ENCOUNTER FOR SCREENING FOR MALIGNANT NEOPLASM OF COLON: ICD-10-CM

## 2024-10-29 PROCEDURE — 90480 ADMN SARSCOV2 VAC 1/ONLY CMP: CPT | Performed by: INTERNAL MEDICINE

## 2024-10-29 PROCEDURE — G0008 ADMIN INFLUENZA VIRUS VAC: HCPCS | Performed by: INTERNAL MEDICINE

## 2024-10-29 PROCEDURE — 90662 IIV NO PRSV INCREASED AG IM: CPT | Performed by: INTERNAL MEDICINE

## 2024-10-29 PROCEDURE — 99215 OFFICE O/P EST HI 40 MIN: CPT | Mod: 25 | Performed by: INTERNAL MEDICINE

## 2024-10-29 PROCEDURE — 91320 SARSCV2 VAC 30MCG TRS-SUC IM: CPT | Performed by: INTERNAL MEDICINE

## 2024-10-29 RX ORDER — ESCITALOPRAM OXALATE 10 MG/1
10 TABLET ORAL DAILY
Qty: 90 TABLET | Refills: 1 | Status: SHIPPED | OUTPATIENT
Start: 2024-10-29

## 2024-10-29 RX ORDER — ROSUVASTATIN CALCIUM 40 MG/1
40 TABLET, COATED ORAL DAILY
Qty: 90 TABLET | Refills: 3 | Status: SHIPPED | OUTPATIENT
Start: 2024-10-29

## 2024-10-29 RX ORDER — GLIPIZIDE 2.5 MG/1
2.5 TABLET, EXTENDED RELEASE ORAL DAILY
Qty: 90 TABLET | Refills: 0 | Status: SHIPPED | OUTPATIENT
Start: 2024-10-29

## 2024-10-29 RX ORDER — ASPIRIN 81 MG/1
81 TABLET, CHEWABLE ORAL DAILY
Qty: 90 TABLET | Refills: 3 | Status: SHIPPED | OUTPATIENT
Start: 2024-10-29

## 2024-10-29 ASSESSMENT — PAIN SCALES - GENERAL: PAINLEVEL_OUTOF10: NO PAIN (0)

## 2024-10-29 NOTE — NURSING NOTE
Pt tolerated injections (Influenza Vaccine 65+: Fluzone HD and Covid 19 12+ Pfizer Comirnaty Vaccine). Site (Left & Right Deltoid) was cleansed with alcohol prior to injections. No pain, burning, swelling or redness at the site of the injection. Patient instructed to remain in clinic for 15 minutes afterwards, and to report any adverse reactions
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
## 2024-10-29 NOTE — PATIENT INSTRUCTIONS
Check with your insurance if this group of medicines for diabetes is covered :  GLP agonists they are mostly injectable one is a tablet ( rybelsus)   Common brand names  are ozempic , trulicitty , victoza , mounjaor , wegovy bydureon

## 2024-11-15 ENCOUNTER — OFFICE VISIT (OUTPATIENT)
Dept: SLEEP MEDICINE | Facility: CLINIC | Age: 69
End: 2024-11-15
Payer: MEDICARE

## 2024-11-15 VITALS
SYSTOLIC BLOOD PRESSURE: 122 MMHG | OXYGEN SATURATION: 98 % | BODY MASS INDEX: 30.22 KG/M2 | HEIGHT: 70 IN | DIASTOLIC BLOOD PRESSURE: 74 MMHG | WEIGHT: 211.1 LBS | HEART RATE: 71 BPM

## 2024-11-15 DIAGNOSIS — G47.30 OBSERVED SLEEP APNEA: Primary | ICD-10-CM

## 2024-11-15 PROCEDURE — 99213 OFFICE O/P EST LOW 20 MIN: CPT | Performed by: INTERNAL MEDICINE

## 2024-11-15 ASSESSMENT — SLEEP AND FATIGUE QUESTIONNAIRES
HOW LIKELY ARE YOU TO NOD OFF OR FALL ASLEEP IN A CAR, WHILE STOPPED FOR A FEW MINUTES IN TRAFFIC: WOULD NEVER DOZE
HOW LIKELY ARE YOU TO NOD OFF OR FALL ASLEEP WHILE SITTING INACTIVE IN A PUBLIC PLACE: WOULD NEVER DOZE
HOW LIKELY ARE YOU TO NOD OFF OR FALL ASLEEP WHILE WATCHING TV: WOULD NEVER DOZE
HOW LIKELY ARE YOU TO NOD OFF OR FALL ASLEEP WHILE LYING DOWN TO REST IN THE AFTERNOON WHEN CIRCUMSTANCES PERMIT: HIGH CHANCE OF DOZING
HOW LIKELY ARE YOU TO NOD OFF OR FALL ASLEEP WHEN YOU ARE A PASSENGER IN A CAR FOR AN HOUR WITHOUT A BREAK: WOULD NEVER DOZE
HOW LIKELY ARE YOU TO NOD OFF OR FALL ASLEEP WHILE SITTING QUIETLY AFTER LUNCH WITHOUT ALCOHOL: WOULD NEVER DOZE
HOW LIKELY ARE YOU TO NOD OFF OR FALL ASLEEP WHILE SITTING AND TALKING TO SOMEONE: WOULD NEVER DOZE
HOW LIKELY ARE YOU TO NOD OFF OR FALL ASLEEP WHILE SITTING AND READING: WOULD NEVER DOZE

## 2024-11-15 NOTE — PATIENT INSTRUCTIONS
For general sleep health questions:   https://www.thensf.org/sleep-health-topics/  http://sleepeducation.org    Medicare and Medicaid patients starting on CPAP or biPAP on or after 5/12/2023  Medicare patients should schedule an IN PERSON CLINIC appointment to provide your CPAP/biPAP usage data to a provider within 90 days of starting CPAP    For new ResMed devices, sign up for device tasia to monitor your device for your followup visits  We encourage you to utilize the [employer] tasia or website ([employer] web ([place]) to monitor your therapy progress and share the data with your healthcare team when you discuss your sleep apnea.                                                      Know your equipment:  CPAP is continuous positive airway pressure that prevents obstructive sleep apnea by keeping the throat from collapsing while you are sleeping. In most cases, the device is  smart  and can slowly self-adjusts if your throat collapses and keeps a record every day of how well you are treated-this information is available to you and your care team.  BPAP is bilevel positive airway pressure that keeps your throat open and also assists each breath with a pressure boost to maintain adequate breathing.  Special kinds of BPAP are used in patients who have inadequate breathing from lung or heart disease. In most cases, the device is  smart  and can slowly self-adjusts to assist breathing. Like CPAP, the device keeps a record of how well you are treated.  Your mask is your connection to the device. You get to choose what feels most comfortable and the staff will help to make sure if fits.     *Masks with magnets:  Updated Contraindications  Masks with magnetic components are contraindicated for use by patients where they, or anyone in close physical contact while using the mask, have the following:   Active medical implants that interact with magnets (i.e., pacemakers, implantable cardioverter defibrillators (ICD),  neurostimulators, cerebrospinal fluid (CSF) shunts, insulin/infusion pumps)   Metallic implants/objects containing ferromagnetic material (i.e., aneurysm clips/flow disruption devices, embolic coils, stents, valves, electrodes, implants to restore hearing or balance with implanted magnets, ocular implants, metallic splinters in the eye)  Updated Warning  Keep the mask magnets at a safe distance of at least 6 inches (150 mm) away from implants or medical devices that may be adversely affected by magnetic interference. This warning applies to you or anyone in close physical contact with your mask. The magnets are in the frame and lower headgear clips, with a magnetic field strength of up to 400mT. When worn, they connect to secure the mask but may inadvertently detach while asleep.  Implants/medical devices, including those listed within contraindications, may be adversely affected if they change function under external magnetic fields or contain ferromagnetic materials that attract/repel to magnetic fields (some metallic implants, e.g., contact lenses with metal, dental implants, metallic cranial plates, screws, tereza hole covers, and bone substitute devices). Consult your physician and  of your implant / other medical device for information on the potential adverse effects of magnetic fields.      Continue PAP therapy every night, for all hours that you are sleeping (including naps.)  As always, try to get at least 8 hours of sleep or more each day, keep a regular sleep schedule, and avoid sleep deprivation. Avoid alcohol.  Reasons that you might need a change to your pressure therapy would be weight gain or loss, waking having inadvertently removed your PAP overnight, having previously felt refreshed by sleep with CPAP use and now waking un-refreshed, and return of daytime sleepiness. Also, the development of new medical problems  (such as heart failure, stroke, medications such as narcotics) can  sometimes affect breathing at night and change your PAP therapy needs.  Please bring PAP with you if you are hospitalized.  If anticipating surgery be sure to discuss with your surgeon that you have sleep apnea and use PAP therapy.    Maintain your equipment as recommended which includes routine cleaning and replacement of supplies.      Call DME for any questions regarding supplies or maintenance.    [place] [facility], [facility] [phone]    Do not drive on engage in potentially dangerous activities if feeling sleepy.    Please follow up in sleep clinic again in 6 months.        Tips for your PAP use-    Mask fitting tips  Mask fitting exercise:    To improve your mask seal and your mobility at night, put mask on and secure in place.  Lie down in bed with full pressure and roll to one side, adjust headgear while in that position to eliminate any leaks. Repeat process rolling to other side.     The mask seal does not have to be perfect:   CPAP machines are designed to make up for small leaks. However, you will not tolerate leaks blowing in your eyes so you will need to adjust.   Any leak should only be near or at the bottom of the mask.  We expect your mask to leak slightly at night.    Do not over-tighten the headgear straps, tighter IS NOT better, we expect minimal leak.    First try re-positioning the mask or headgear before tightening the headgear straps.  Mask leaks are expected due to changing sleeping positions. Try pulling the mask away from your skin allowing the cushion to re-inflate will minimize the leak.  If you struggle for a good fit, try turning the CPAP off and then readjust the mask by pulling it away from your face and then turning back on the CPAP.        Humidifier tips  Humidifiers can be adjusted to increase or decrease the amount of moisture according to your comfort level. You may need to adjust this frequently at first, but then might only change it  with seasonal weather changes.     Try INCREASING the humidity if:  You experience a dry, irritated nasal passage or throat.  You have a runny, drippy nose or sneezing fits after using CPAP.  You experience nasal congestion during or after CPAP use.    Try DECREASING the humidity if:  You have excessive condensation or  rain out  in the tubing or mask.  Otherwise keep the tubing warm during the night by running it underneath the blankets or pillow.      Clinic visit after initial PAP set-up   Bring your equipment with you to your 5-8 week follow up clinic visit.  We will be extracting your data from the machine if not available from the cloud based [employer].        Travel  Always take your equipment with you when you travel.  If you fly with your equipment bring it on with you as a carry on.  Medical equipment does not count as a carry on.    If you travel international the machines take 110-240v.  The only adapter needed is the adapter that will fit into the receptacle (outlet).    You may also want to bring an extension cord as many hotel rooms have limited outlets at the bedside.  Do not travel with water in your humidifier chamber.     Cleaning and Maintenance Guidelines    Equipment Frequency Cleaning Method   Mask First Day    Daily      Weekly Soak mask in hot soapy water for 30 minutes, rinse and air dry.  Wipe nasal cushion with a hot soapy (Ivory, baby shampoo) cloth and rinse.  Baby wipes may also be used.  Do not use anti-bacterial soaps,Gisele  liquid soap, rubbing alcohol, bleach or ammonia.  Wash frame in hot soapy water (Ivory, baby shampoo) rinse and let air dry   Headgear Biweekly Wash in hot soapy water, rinse and air dry   Reusable Gray Filter Weekly Wash in hot soapy water, rinse, put in towel squeeze moisture out, let air dry   Disposable White Filter Check Weekly Replace when brown or gray in color; at least every 2 to 3 months   Humidifier Chamber Daily    Weekly Empty distilled water from  humidifier and let air dry    Hand wash in hot soapy water, rinse and air dry   Tubing Weekly Wash in hot soapy water, rinse and let air dry   Mask, Tubing and Humidifier Chamber As needed Disinfect: Soak in 1 part distilled white vinegar to 3 parts hot water for 30 minutes, rinse well and air dry  Not the material headgear        MASK AND SUPPLY REORDERING and EQUIPMENT NEEDS through your DME and per your insurance  Reminder: Most insurance companies will allow for a new mask, headgear, tubing, and reusable gray filter every six months.  Disposable white ultra-fine filters are covered monthly.      HOME AND SAFETY INSTRUCTIONS  Do not use frayed or cracked electrical cords, multi plug adaptors, or switched receptacles  Do not immerse electrical equipment into water  Assure that electrical cords do not become a tripping hazard

## 2024-11-15 NOTE — PROGRESS NOTES
Chief complaint: Follow-up sleep study and CPAP    History of Present Illness: 68-year-old female with history of hypertension, type 2 diabetes, obesity and severe obstructive sleep apnea.  She always had a long history of high sleep requirement.  She was observed to have apneas and gasping by family members.  She was found to have severe sleep apnea by home sleep apnea testing.  She required sleep study in the sleep lab in order to have her insurance cover CPAP.  She is here today for those results.  They confirm indeed severe obstructive sleep apnea.  There is mild associated hypoxemia.  Sleep apnea was present both supine and lateral.  She has been on CPAP now for few months.  She has been able to wear for 5 hours or so but then when she wakes up she has pain under her ear and around her jaw from the headgear.  She is interested in trying a different set up to see if she can tolerate it better and longer.  Currently she is taking the mask off after about 4 to 5 hours and then falling back asleep for a few more hours.  She still feels like she has a high sleep requirement.  Otherwise she denies any problems with the pressures.  No new sleep concerns.  She is a side sleeper.  Rarely sleeps on her back.    [place] Sleepiness Scale  Total score - [place]: (Patient-Rptd) 3 (11/15/2024 11:35 AM)   (Less than 10 normal)    Insomnia Severity Scale  KERRI Total Score: (Patient-Rptd) 10  (normal 0-7, mild 8-14, moderate 15-21, severe 22-28)    Past Medical History:   Diagnosis Date    Acne     Allergic rhinitis     Gout     Hx of radiation therapy     Hyperlipemia     Idiopathic urticaria 10/31/2016    Irritable bowel syndrome     Malignant neoplasm of right breast (H) 8/25/2016    Migraine     Mild traumatic brain injury (H) 7/11/2017    Rosacea        Allergies   Allergen Reactions    Augmentin [Amoxicillin-Pot Clavulanate] Anaphylaxis    Other Food Allergy Itching and Swelling     Hazulnut    Tomato     Blueberry  [Vaccinium Angustifolium] Other (See Comments)    Codeine Unknown    Corn-Containing Products     Hazelnuts [Nuts]     Metformin Headache and GI Disturbance    Trees        Current Outpatient Medications   Medication Sig Dispense Refill    ACCU-CHEK GUIDE test strip Use to test blood sugar once daily. 100 strip 5    allopurinol (ZYLOPRIM) 100 MG tablet Take 1 tablet (100 mg) by mouth daily Please establish with a new primary care provider for further refills 90 tablet 0    aspirin (ASA) 81 MG chewable tablet Take 1 tablet (81 mg) by mouth daily. 90 tablet 3    atorvastatin (LIPITOR) 40 MG tablet Take 1 tablet (40 mg) by mouth daily 90 tablet 3    blood glucose (NO BRAND SPECIFIED) test strip Use to test blood sugar once  daily or as directed. To accompany: Blood Glucose Monitor Brands: per insurance. 100 strip 6    blood glucose monitoring (NO BRAND SPECIFIED) meter device kit Use to test blood sugar once daily or as directed. Preferred blood glucose meter OR supplies to accompany: Blood Glucose Monitor Brands: per insurance. 1 kit 0    blood glucose monitoring (SOFTCLIX) lancets Use to test blood sugar once daily. 100 each 4    calcium-vitamin D (CALCIUM-VITAMIN D) 500 mg(1,250mg) -200 unit per tablet [CALCIUM-VITAMIN D (CALCIUM-VITAMIN D) 500 MG(1,250MG) -200 UNIT PER TABLET] Take 1 tablet by mouth daily.       escitalopram (LEXAPRO) 10 MG tablet Take 1 tablet (10 mg) by mouth daily. 90 tablet 1    glipiZIDE (GLUCOTROL XL) 2.5 MG 24 hr tablet Take 1 tablet (2.5 mg) by mouth daily. 90 tablet 0    losartan (COZAAR) 100 MG tablet Take 1 tablet (100 mg) by mouth daily 90 tablet 3    rosuvastatin (CRESTOR) 40 MG tablet Take 1 tablet (40 mg) by mouth daily. 90 tablet 3    thin (NO BRAND SPECIFIED) lancets Use one daily with lanceting device. To accompany: Blood Glucose Monitor Brands: per insurance. 100 each 6    triamcinolone (KENALOG) 0.1 % external cream [TRIAMCINOLONE (KENALOG) 0.1 % CREAM] APPLY EXTERNALLY TO RASH  DAILY AS NEEDED 45 g 3    vitamin D3 (CHOLECALCIFEROL) 25 mcg (1000 units) tablet Take 1 tablet (25 mcg) by mouth daily       No current facility-administered medications for this visit.       Social History     Socioeconomic History    Marital status: Single     Spouse name: Not on file    Number of children: Not on file    Years of education: Not on file    Highest education level: Not on file   Occupational History    Not on file   Tobacco Use    Smoking status: Some Days     Current packs/day: 0.10     Average packs/day: 0.1 packs/day for 20.0 years (2.0 ttl pk-yrs)     Types: Cigarettes     Passive exposure: Never    Smokeless tobacco: Never   Vaping Use    Vaping status: Never Used   Substance and Sexual Activity    Alcohol use: Not Currently     Alcohol/week: 2.0 standard drinks of alcohol     Types: 2 Standard drinks or equivalent per week    Drug use: No    Sexual activity: Not Currently   Other Topics Concern    Not on file   Social History Narrative    She is single, lives alone, and does not have children.  She does not smoke cigarettes and occasionally has an alcoholic beverage. She has worked as independent  and part-time at [employer].  She became disabled in August 2016.     Social Drivers of Health     Financial Resource Strain: Low Risk  (3/24/2024)    [facility]    Financial Resource Strain     Difficulty of Paying Living Expenses: 3     Difficulty of Paying Living Expenses: Not on file   Food Insecurity: No Food Insecurity (3/24/2024)    [facility]    Food Insecurity     Do you worry your food will run out before you are able to buy more?: 1   Transportation Needs: No Transportation Needs (3/24/2024)    Received from [facility]    Transportation Needs     Does lack of transportation keep you from medical  "appointments?: 1     Does lack of transportation keep you from work, meetings or getting things that you need?: 1   Physical Activity: Not on file   Stress: Not on file   Social Connections: Socially Integrated (3/24/2024)    Received from [facility]    Social Connections     Do you often feel lonely or isolated from those around you?: 0   Interpersonal Safety: Low Risk  (2024)    Interpersonal Safety     Do you feel physically and emotionally safe where you currently live?: Yes     Within the past 12 months, have you been hit, slapped, kicked or otherwise physically hurt by someone?: No     Within the past 12 months, have you been humiliated or emotionally abused in other ways by your partner or ex-partner?: No   Housing Stability: Low Risk  (3/24/2024)    Received from [facility]    Housing Stability     What is your housing situation today?: 1       Family History   Problem Relation Age of Onset    Breast Cancer Mother 44         age 46    Coronary Artery Disease Father          age 60    Other - See Comments Sister         benign brain tumor    Bipolar Disorder Sister     Kidney Cancer Sister     Coronary Artery Disease Sister          age 74    Snoring Sister     No Known Problems Paternal Grandfather     Multiple myeloma Brother         has amyloidosis as well    Cancer Brother         ? waldenstrom's    Kidney Disease Brother         had a transplant    Breast Cancer Maternal Aunt 70           EXAM:  /74   Pulse 71   Ht 1.778 m (5' 10\")   Wt 95.8 kg (211 lb 1.6 oz)   LMP  (LMP Unknown)   SpO2 98%   BMI 30.29 kg/m    GENERAL: Alert and no distress  EYES: Eyes grossly normal to inspection.  No discharge or erythema, or obvious scleral/conjunctival abnormalities.  RESP: No audible wheeze, cough, or visible cyanosis.    SKIN: Visible skin clear. No significant rash, abnormal pigmentation or lesions.  NEURO: Cranial " nerves grossly intact.  Mentation and speech appropriate for age.  PSYCH: Appropriate affect, tone, and pace of words       PSG 8/28/2024  Weight 202 lbs BMI 29.06  AHI 43.8, RDI 48.9, supine AHI 81.8, Lowest O2 Sat 83%  Time with oxygen saturation less than or equal to 88% 15.7 minutes    HSAT 1/31/2024  Weight 198 lbs BMI 28.8  AHI 33 Lowest O2 Sat 80%  Time with O2 Sat at or below 88% 46 minutes       ResMed air sense 11 auto PAP download from 10/13/2024 to 11/11/2024 reviewed:  Per cent of days used greater than 4 Hours 97% (minimum goal greater than 70%)  Average use on days used: 5 hours 33 min  Settings: Min EPAP 5 cmH2O    Max EPAP 15 cmH2O  Pressures delivered 90/95th percentile for pressure 10.4 cmH2O  Average AHI 5.7 events per hour (goal less than 5)  Leak acceptable    TSH   Date Value Ref Range Status   07/21/2021 2.55 0.30 - 5.00 uIU/mL Final         ASSESSMENT:  68-year-old female with hypertension, type 2 diabetes, obesity, high sleep requirement and severe obstructive sleep apnea.  She is meeting compliance goals and is benefiting from CPAP and that her AHI is normalized for obstructive apneas.  There are a few central apneas with this CPAP.  Ongoing treatment of obstructive sleep apnea is medically indicated.    PLAN:  Patient is encouraged to work with the medical supply company to find a mask and headgear set up that is more comfortable and will allow her to use the mask throughout the entire sleep.  Will increase the lower limit pressure from 5 closer to the treatment pressure.  Follow-up with me in 6 months.  Continue efforts at weight management.  She is agreeable with this plan.      25 minutes spent by me on the date of the encounter doing chart review, history and exam, documentation and further activities per the note    Jazmine Hagan M.D.  Pulmonary/Critical Care/Sleep Medicine    [facility]   Floor 1, [address]   [address] [place] [address]   Appointments: [phone]    The above note was dictated using voice recognition software and may include typographical errors. Please contact the author for any clarifications.

## 2024-11-15 NOTE — NURSING NOTE
Scheduled CPAP follow up. PSG interpretation and report given to patient at clinic checkout as requested. AVS given to patient at clinic checkout. CPAP auto pressure change set as requested by provider.  Jared Gallardo on 11/15/2024 at 3:35 PM

## 2024-11-27 ENCOUNTER — DOCUMENTATION ONLY (OUTPATIENT)
Dept: SLEEP MEDICINE | Facility: CLINIC | Age: 69
End: 2024-11-27
Payer: MEDICARE

## 2024-11-27 NOTE — PROGRESS NOTES
Patient came to [facility] for mask fitting appointment on November 27, 2024. Patient requested to switch masks because general discomfort . Discussed the following masks: Eson 2, [employer]age FX Patient selected a [employer] Mask name: Eson 2 Nasal mask size Medium.  Pt also received water chamber and filters for her Airsense 11.

## 2024-12-03 ENCOUNTER — PATIENT OUTREACH (OUTPATIENT)
Dept: CARE COORDINATION | Facility: CLINIC | Age: 69
End: 2024-12-03
Payer: MEDICARE

## 2024-12-18 ENCOUNTER — TRANSFERRED RECORDS (OUTPATIENT)
Dept: HEALTH INFORMATION MANAGEMENT | Facility: CLINIC | Age: 69
End: 2024-12-18
Payer: MEDICARE

## 2024-12-23 ENCOUNTER — PATIENT OUTREACH (OUTPATIENT)
Dept: CARE COORDINATION | Facility: CLINIC | Age: 69
End: 2024-12-23
Payer: MEDICARE

## 2025-01-14 ENCOUNTER — PATIENT OUTREACH (OUTPATIENT)
Dept: CARE COORDINATION | Facility: CLINIC | Age: 70
End: 2025-01-14
Payer: MEDICARE

## 2025-01-18 ENCOUNTER — HEALTH MAINTENANCE LETTER (OUTPATIENT)
Age: 70
End: 2025-01-18

## 2025-01-19 DIAGNOSIS — E11.69 TYPE 2 DIABETES MELLITUS WITH OTHER SPECIFIED COMPLICATION, WITHOUT LONG-TERM CURRENT USE OF INSULIN (H): Primary | ICD-10-CM

## 2025-01-20 ENCOUNTER — ALLIED HEALTH/NURSE VISIT (OUTPATIENT)
Dept: EDUCATION SERVICES | Facility: CLINIC | Age: 70
End: 2025-01-20
Payer: MEDICARE

## 2025-01-20 ENCOUNTER — LAB (OUTPATIENT)
Dept: LAB | Facility: CLINIC | Age: 70
End: 2025-01-20
Payer: MEDICARE

## 2025-01-20 VITALS — BODY MASS INDEX: 29.13 KG/M2 | WEIGHT: 203 LBS

## 2025-01-20 DIAGNOSIS — M10.9 GOUT: Primary | ICD-10-CM

## 2025-01-20 DIAGNOSIS — E11.69 TYPE 2 DIABETES MELLITUS WITH OTHER SPECIFIED COMPLICATION, WITHOUT LONG-TERM CURRENT USE OF INSULIN (H): ICD-10-CM

## 2025-01-20 DIAGNOSIS — I10 ESSENTIAL HYPERTENSION: ICD-10-CM

## 2025-01-20 DIAGNOSIS — E11.69 TYPE 2 DIABETES MELLITUS WITH OTHER SPECIFIED COMPLICATION, WITHOUT LONG-TERM CURRENT USE OF INSULIN (H): Primary | ICD-10-CM

## 2025-01-20 LAB
ANION GAP SERPL CALCULATED.3IONS-SCNC: 9 MMOL/L (ref 7–15)
BUN SERPL-MCNC: 17.2 MG/DL (ref 8–23)
CALCIUM SERPL-MCNC: 10 MG/DL (ref 8.8–10.4)
CHLORIDE SERPL-SCNC: 102 MMOL/L (ref 98–107)
CREAT SERPL-MCNC: 0.86 MG/DL (ref 0.51–0.95)
EGFRCR SERPLBLD CKD-EPI 2021: 72 ML/MIN/1.73M2
EST. AVERAGE GLUCOSE BLD GHB EST-MCNC: 180 MG/DL
GLUCOSE SERPL-MCNC: 172 MG/DL (ref 70–99)
HBA1C MFR BLD: 7.9 % (ref 0–5.6)
HCO3 SERPL-SCNC: 26 MMOL/L (ref 22–29)
POTASSIUM SERPL-SCNC: 4.5 MMOL/L (ref 3.4–5.3)
SODIUM SERPL-SCNC: 137 MMOL/L (ref 135–145)
URATE SERPL-MCNC: 4.6 MG/DL (ref 2.4–5.7)

## 2025-01-20 PROCEDURE — 36415 COLL VENOUS BLD VENIPUNCTURE: CPT

## 2025-01-20 PROCEDURE — 83036 HEMOGLOBIN GLYCOSYLATED A1C: CPT

## 2025-01-20 PROCEDURE — 99207 PR NO CHARGE NURSE ONLY: CPT

## 2025-01-20 PROCEDURE — 84550 ASSAY OF BLOOD/URIC ACID: CPT

## 2025-01-20 PROCEDURE — 80048 BASIC METABOLIC PNL TOTAL CA: CPT

## 2025-01-20 PROCEDURE — G0108 DIAB MANAGE TRN  PER INDIV: HCPCS

## 2025-01-20 NOTE — PROGRESS NOTES
Diabetes Self-Management Education & Support    Presents for: Individual review    Type of Service: In Person Visit      Assessment  Dorita is a very pleasant 69-year-old who returns to clinic today to review blood glucose, medications, recheck A1c and to assess/assist her with her current diabetes self-management skills with an A1c not meeting ADA goal.  She arrived today unaccompanied and had a list of her blood glucose readings as well as a 3-day food log with her.  Diabetes medications were reviewed and she confirms she is currently taking 2.5 mg of glipizide XL daily with no missed or skipped doses.  Informed me today that she actually had not started the glipizide until after the first of the year as she was having difficulty getting her test strips from [facility].  In addition she informed me that when she did start taking it she sometimes forgot to take it with lunch as had been instructed and then would take it when she did remember or with dinner.  I let her know today because it is extended release that it was fine that she take it with her other morning medications.  Dorita also had with her list today of GLP-1 agonists that were preferred and would be covered by her new formulary.  This included Ozempic, Trulicity, and Mounjaro.  I let her know today that I thought the addition of this to her plan of care would be very beneficial to her as the GLP-1's were the only medications that you got glycemic management benefit on both sides of the blood glucose-both Premeal and postmeal.  She did say that her sister had tried 1 of these previously, she could not remember which 1 and wanted to check with her first before committing.  Prior to her visit today we did recheck her A1c and was very pleased to see it came back lower from the previous 8.7% to 7.9% today.  I congratulated her on this and informed her she should especially be proud of herself as the holidays are usually very difficult for a lot of people  Patient received lunch tray.    "food michaels.  Dorita is eating 3 meals daily, but let me know today that she often struggles with breakfast as she is not hungry for a full meal.  I let her know that she did not necessarily have to have a \"full meal\" but I would like her to have something even if it was just a protein shake or a hard boiled egg and some fruit.  He is scheduled to meet with her primary care provider, Dr. Maravilla, later this week and said today that she will discuss the GLP-1 with her more at their visit.  We agreed to meet back here in clinic in the spring and I did instruct her to call with any questions or concerns that come up before that time.    Patient's most recent   Lab Results   Component Value Date    A1C 7.9 01/20/2025     is not meeting goal of  7-7.5%    Diabetes knowledge and skills assessment:   Patient is knowledgeable in diabetes management concepts related to: Healthy Eating, Being Active, Monitoring, and Reducing Risks    Based on learning assessment above, most appropriate setting for further diabetes education would be: Individual setting.    PLAN  1. Eat 3 balanced meals each day - Monitor carb intake and limit to 45-60 grams per meal  This would be equal to 3-4 choices ~  1 choice = 15 grams    Do not wait longer than 4-5 hours to eat something  Snacks limit to no more than 30 grams of carbohydrates or 2 choices  Make sure you include protein source with each meal and at bedtime - this has been shown to help with blood glucose elevations    2.  Check blood sugars once each day - please try and alternate the times you check between am fasting( right after you wake before eating) and 2 hours AFTER dinner - this allows us to get a better idea of what is happening throughout your day , not at just one time     Fasting and before meal target is 80 - 130   2 hours after a meal target is < 180  remember to bring meter and log book to all appointments    3. Incorporate 30 minutes activity into each day - does not need to " "be all at one time & walking counts    4. Take diabetes medications as prescribed Continue the Glipizide XL 2.5 mg - but take in the morning     Discussed the addition of a GLP-1 agonist with patient today during visit.  She did have with her the list of GLP-1's that would be covered however wanted to wait to speak more with Dr. Maravilla at her upcoming appointment this Friday.  Topics to cover at upcoming visits: Healthy Eating, Being Active, Taking Medication, Reducing Risks, and Healthy Coping    Follow-up: 5/5/25    See Care Plan for co-developed, patient-state behavior change goals.  AVS provided for patient today.    Education Materials Provided:  No new materials provided today      SUBJECTIVE/OBJECTIVE:  Presents for: Individual review  Accompanied by: Self  Diabetes education in the past 24 mo: Yes ([facility] 10/14/24)  Focus of Visit: Monitoring, Taking Medication, Healthy Coping, Healthy Eating, Assistance w/ making life changes, Self-care behavioral goal setting, Being Active, Problem Solving  Diabetes type: Type 2  Date of diagnosis: 2022  Disease course: Improving  Transportation concerns: No  Difficulty affording diabetes medication?: Sometimes  Other concerns:: None (TBI in 2016)  Cultural Influences/Ethnic Background:  Not  or       Diabetes Symptoms & Complications:  Diabetes Related Symptoms: None  Weight trend: Stable  Symptom course: Stable  Disease course: Improving  Complications assessed today?: No    Patient Problem List and Family Medical History reviewed for relevant medical history, current medical status, and diabetes risk factors.    Vitals:  Wt 92.1 kg (203 lb)   LMP  (LMP Unknown)   BMI 29.13 kg/m    Estimated body mass index is 29.13 kg/m  as calculated from the following:    Height as of 11/15/24: 1.778 m (5' 10\").    Weight as of this encounter: 92.1 kg (203 lb).   Last 3 BP:   BP Readings from Last 3 Encounters:   11/15/24 122/74   10/29/24 134/86   08/22/24 124/87 " "      History   Smoking Status    Some Days    Types: Cigarettes   Smokeless Tobacco    Never       Labs:  Lab Results   Component Value Date    A1C 7.9 01/20/2025     Lab Results   Component Value Date     01/03/2024     06/03/2022     Lab Results   Component Value Date    LDL  07/17/2024      Comment:      Cannot estimate LDL when triglyceride exceeds 400 mg/dL     07/17/2024     06/03/2022     Direct Measure HDL   Date Value Ref Range Status   07/17/2024 47 (L) >=50 mg/dL Final   ]  GFR Estimate   Date Value Ref Range Status   01/03/2024 73 >60 mL/min/1.73m2 Final   07/16/2020 >60 >60 mL/min/1.73m2 Final     GFR Estimate If Black   Date Value Ref Range Status   07/16/2020 >60 >60 mL/min/1.73m2 Final     Lab Results   Component Value Date    CR 0.86 01/03/2024     No results found for: \"MICROALBUMIN\"    Healthy Eating:  Healthy Eating Assessed Today: Yes  Cultural/Tenriism diet restrictions?: No  Do you have any food allergies or intolerances?: Yes  Meal planning/habits: Smaller portions, Avoiding sweets, Low carb  Who cooks/prepares meals for you?: Self, Other  Who purchases food in  your home?: Self  How many times a week on average do you eat food made away from home (restaurant/take-out)?: 3  Meals include: Breakfast, Dinner, Afternoon Snack  Breakfast: 10-11am: will have coffee with crustless quiche or hard boiled eggs or a small bowl of cereal (Cheerios 3/4c) with raspberries or blackberries  Lunch: 12-1 pm: frequently will go out to lunch with her sister where they will always split a meal- if home might be soup or a sandwich  Dinner: 5-6 pm: + protein (often chix or hamburger 4-6 oz) vegetable and fresh fruit  last night was 1 c beef stew  Snacks: during day - 6 oz gingerale and cashews nuts or [place] granola bar ( 20 CHO)  evenings  2 slices cheese with salami or nuts  Other: no white breads, little to no sweets  Beverages: Water, Coffee, Milk, Soda, Other (see " Comments)  Please elaborate:: 6 oz gingerale most days  Has patient met with a dietitian in the past?: No    Being Active:  Being Active Assessed Today: Yes  Exercise:: Currently not exercising (swims at [facility] 2x/wk 60 min and walks on other days 15-30 minutes)  Barrier to exercise: None    Monitoring:  Monitoring Assessed Today: Yes  Did patient bring glucose meter to appointment? : No (logbook)  Blood Glucose Meter: Accu-chek  Times checking blood sugar at home (number): 1  Times checking blood sugar at home (per): Day  Blood glucose trend: Fluctuating    The following are Dorita's blood glucose readings dating  through  with most recent listed first.    FB, 188, 210, 153, 156, 152, 171, 157, 176, 164, 182, 158, 183  Post dinner/at bedtime: 153, 145, 175, 253, 153, 165, 108, 226, 167, 160, 103    These readings were reviewed and discussed with Dorita during her visit today.  Overall I informed her postprandial numbers looked good, however her a.m. fastings were higher than we would like them to be.  I went on to explain to her and provide her education on how the majority of diabetes medications will only work on 1 side of blood glucose or the other (a.m. fasting/Premeal or postmeal) she is currently taking glipizide, which focuses on her postmeal and she has nothing helping her with her Premeal or fasting numbers.  I then went on to emphasize to her the benefit of a GLP-1 agonist which actually is the only diabetes medication that works on both sides of the blood glucose and how the addition of this would benefit her.    Taking Medications:  Diabetes Medication(s)       Sulfonylureas       glipiZIDE (GLUCOTROL XL) 2.5 MG 24 hr tablet Take 1 tablet (2.5 mg) by mouth daily.            Taking Medication Assessed Today: Yes  Current Treatments: Diet, Oral Medication (taken by mouth)  Problems taking diabetes medications regularly?: No  Diabetes medication side effects?: Yes (was unable to tolerate the  Metformin we tried again at last visit)    Problem Solving:  Problem Solving Assessed Today: Yes  Is the patient at risk for hypoglycemia?: No  Is the patient at risk for DKA?: No  Does patient have severe weather/disaster plan for diabetes management?: Not Needed  Does patient have sick day plan for diabetes management?: Not Needed              Reducing Risks:  Reducing Risks Assessed Today: Yes  Diabetes Risks: Age over 45 years, Sedentary Lifestyle  CAD Risks: Post-menopausal, Sedentary lifestyle, Hypertension, Diabetes Mellitus, Family history  Has dilated eye exam at least once a year?: Yes  Sees dentist every 6 months?: Yes  Feet checked by healthcare provider in the last year?: Yes    Healthy Coping:  Healthy Coping Assessed Today: Yes  Emotional response to diabetes: Ready to learn  Informal Support system:: Family  Stage of change: ACTION (Actively working towards change)  Support resources: In-person Offerings  Patient Activation Measure Survey Score:       No data to display                  Care Plan and Education Provided:  Healthy Eating: Balanced meals, Consistency in amount and timing of carbohydrate intake, Eating out, and Portion control, Being Active: Amount recommended (150 minutes moderate or 75 minutes vigorous activity and 2-3 days strength training per week) and Finding a physical activity routine that works for you, Monitoring: Frequency of monitoring, Individual glucose targets, and Purpose, Taking Medication: Action of prescribed medication(s), Side effects of prescribed medication(s), and When to take medication(s), Problem Solving: High glucose - causes, signs/symptoms, treatment and prevention, Reducing Risks: Eye care, Goal for A1c, how it relates to glucose and how often to check, Preventing cardiovascular disease, including blood pressure goals, lipid goals, recommendations for cardioprotective medications, statins, and aspirin, Prevention, early diagnostic measures and treatment of  complications, and A1C - goals, relating to blood glucose levels, how often to check, and Healthy Coping: Benefits of making appropriate lifestyle changes, Identifying helpful resources, Recognize feelings about diagnosis, and Utilize support systems    Thank you,  Sandra Hill RN [facility]  Certified Diabetes Care and   Visit type : DSMT      Time Spent: 60 minutes  Encounter Type: Individual    Any diabetes medication dose changes were made via the CDE Protocol per the patient's referring provider and primary care provider. A copy of this encounter was shared with the provider.   Much or all of the text in this note was generated through the use of the Dragon Dictate voice-to-text software.Errors in spelling or words which seem out of context are unintentional. Sound alike errors, in particular, may have escaped editing.

## 2025-01-20 NOTE — LETTER
1/20/2025         RE: Dorita Stringer  [address]  [place] [address]        Dear Colleague,    Thank you for referring your patient, Dorita Stringer, to the [facility]. Please see a copy of my visit note below.    Diabetes Self-Management Education & Support    Presents for: Individual review    Type of Service: In Person Visit      Assessment  Dorita is a very pleasant 69-year-old who returns to clinic today to review blood glucose, medications, recheck A1c and to assess/assist her with her current diabetes self-management skills with an A1c not meeting ADA goal.  She arrived today unaccompanied and had a list of her blood glucose readings as well as a 3-day food log with her.  Diabetes medications were reviewed and she confirms she is currently taking 2.5 mg of glipizide XL daily with no missed or skipped doses.  Informed me today that she actually had not started the glipizide until after the first of the year as she was having difficulty getting her test strips from [employer].  In addition she informed me that when she did start taking it she sometimes forgot to take it with lunch as had been instructed and then would take it when she did remember or with dinner.  I let her know today because it is extended release that it was fine that she take it with her other morning medications.  Dorita also had with her list today of GLP-1 agonists that were preferred and would be covered by her new formulary.  This included Ozempic, Trulicity, and Mounjaro.  I let her know today that I thought the addition of this to her plan of care would be very beneficial to her as the GLP-1's were the only medications that you got glycemic management benefit on both sides of the blood glucose-both Premeal and postmeal.  She did say that her sister had tried 1 of these previously, she could not remember which 1 and wanted to check with her first before committing.  Prior to her  "visit today we did recheck her A1c and was very pleased to see it came back lower from the previous 8.7% to 7.9% today.  I congratulated her on this and informed her she should especially be proud of herself as the holidays are usually very difficult for a lot of people food michaels.  Dorita is eating 3 meals daily, but let me know today that she often struggles with breakfast as she is not hungry for a full meal.  I let her know that she did not necessarily have to have a \"full meal\" but I would like her to have something even if it was just a protein shake or a hard boiled egg and some fruit.  He is scheduled to meet with her primary care provider, Dr. Maravilla, later this week and said today that she will discuss the GLP-1 with her more at their visit.  We agreed to meet back here in clinic in the spring and I did instruct her to call with any questions or concerns that come up before that time.    Patient's most recent   Lab Results   Component Value Date    A1C 7.9 01/20/2025     is not meeting goal of  7-7.5%    Diabetes knowledge and skills assessment:   Patient is knowledgeable in diabetes management concepts related to: Healthy Eating, Being Active, Monitoring, and Reducing Risks    Based on learning assessment above, most appropriate setting for further diabetes education would be: Individual setting.    PLAN  1. Eat 3 balanced meals each day - Monitor carb intake and limit to 45-60 grams per meal  This would be equal to 3-4 choices ~  1 choice = 15 grams    Do not wait longer than 4-5 hours to eat something  Snacks limit to no more than 30 grams of carbohydrates or 2 choices  Make sure you include protein source with each meal and at bedtime - this has been shown to help with blood glucose elevations    2.  Check blood sugars once each day - please try and alternate the times you check between am fasting( right after you wake before eating) and 2 hours AFTER dinner - this allows us to get a better idea of what " is happening throughout your day , not at just one time     Fasting and before meal target is 80 - 130   2 hours after a meal target is < 180  remember to bring meter and log book to all appointments    3. Incorporate 30 minutes activity into each day - does not need to be all at one time & walking counts    4. Take diabetes medications as prescribed Continue the Glipizide XL 2.5 mg - but take in the morning     Discussed the addition of a GLP-1 agonist with patient today during visit.  She did have with her the list of GLP-1's that would be covered however wanted to wait to speak more with Dr. Maravilla at her upcoming appointment this Friday.  Topics to cover at upcoming visits: Healthy Eating, Being Active, Taking Medication, Reducing Risks, and Healthy Coping    Follow-up: 5/5/25    See Care Plan for co-developed, patient-state behavior change goals.  AVS provided for patient today.    Education Materials Provided:  No new materials provided today      SUBJECTIVE/OBJECTIVE:  Presents for: Individual review  Accompanied by: Self  Diabetes education in the past 24 mo: Yes ([facility] 10/14/24)  Focus of Visit: Monitoring, Taking Medication, Healthy Coping, Healthy Eating, Assistance w/ making life changes, Self-care behavioral goal setting, Being Active, Problem Solving  Diabetes type: Type 2  Date of diagnosis: 2022  Disease course: Improving  Transportation concerns: No  Difficulty affording diabetes medication?: Sometimes  Other concerns:: None (TBI in 2016)  Cultural Influences/Ethnic Background:  Not  or       Diabetes Symptoms & Complications:  Diabetes Related Symptoms: None  Weight trend: Stable  Symptom course: Stable  Disease course: Improving  Complications assessed today?: No    Patient Problem List and Family Medical History reviewed for relevant medical history, current medical status, and diabetes risk factors.    Vitals:  Wt 92.1 kg (203 lb)   LMP  (LMP Unknown)   BMI 29.13 kg/m    Estimated  "body mass index is 29.13 kg/m  as calculated from the following:    Height as of 11/15/24: 1.778 m (5' 10\").    Weight as of this encounter: 92.1 kg (203 lb).   Last 3 BP:   BP Readings from Last 3 Encounters:   11/15/24 122/74   10/29/24 134/86   08/22/24 124/87       History   Smoking Status     Some Days     Types: Cigarettes   Smokeless Tobacco     Never       Labs:  Lab Results   Component Value Date    A1C 7.9 01/20/2025     Lab Results   Component Value Date     01/03/2024     06/03/2022     Lab Results   Component Value Date    LDL  07/17/2024      Comment:      Cannot estimate LDL when triglyceride exceeds 400 mg/dL     07/17/2024     06/03/2022     Direct Measure HDL   Date Value Ref Range Status   07/17/2024 47 (L) >=50 mg/dL Final   ]  GFR Estimate   Date Value Ref Range Status   01/03/2024 73 >60 mL/min/1.73m2 Final   07/16/2020 >60 >60 mL/min/1.73m2 Final     GFR Estimate If Black   Date Value Ref Range Status   07/16/2020 >60 >60 mL/min/1.73m2 Final     Lab Results   Component Value Date    CR 0.86 01/03/2024     No results found for: \"MICROALBUMIN\"    Healthy Eating:  Healthy Eating Assessed Today: Yes  Cultural/Moravian diet restrictions?: No  Do you have any food allergies or intolerances?: Yes  Meal planning/habits: Smaller portions, Avoiding sweets, Low carb  Who cooks/prepares meals for you?: Self, Other  Who purchases food in  your home?: Self  How many times a week on average do you eat food made away from home (restaurant/take-out)?: 3  Meals include: Breakfast, Dinner, Afternoon Snack  Breakfast: 10-11am: will have coffee with crustless quiche or hard boiled eggs or a small bowl of cereal (Cheerios 3/4c) with raspberries or blackberries  Lunch: 12-1 pm: frequently will go out to lunch with her sister where they will always split a meal- if home might be soup or a sandwich  Dinner: 5-6 pm: + protein (often chix or hamburger 4-6 oz) vegetable and fresh fruit  last " night was 1 c beef stew  Snacks: during day - 6 oz gingerale and cashews nuts or [place] granola bar ( 20 CHO)  evenings  2 slices cheese with salami or nuts  Other: no white breads, little to no sweets  Beverages: Water, Coffee, Milk, Soda, Other (see Comments)  Please elaborate:: 6 oz gingerale most days  Has patient met with a dietitian in the past?: No    Being Active:  Being Active Assessed Today: Yes  Exercise:: Currently not exercising (swims at [facility] 2x/wk 60 min and walks on other days 15-30 minutes)  Barrier to exercise: None    Monitoring:  Monitoring Assessed Today: Yes  Did patient bring glucose meter to appointment? : No (logbook)  Blood Glucose Meter: Accu-chek  Times checking blood sugar at home (number): 1  Times checking blood sugar at home (per): Day  Blood glucose trend: Fluctuating    The following are Dorita's blood glucose readings dating  through  with most recent listed first.    FB, 188, 210, 153, 156, 152, 171, 157, 176, 164, 182, 158, 183  Post dinner/at bedtime: 153, 145, 175, 253, 153, 165, 108, 226, 167, 160, 103    These readings were reviewed and discussed with Dorita during her visit today.  Overall I informed her postprandial numbers looked good, however her a.m. fastings were higher than we would like them to be.  I went on to explain to her and provide her education on how the majority of diabetes medications will only work on 1 side of blood glucose or the other (a.m. fasting/Premeal or postmeal) she is currently taking glipizide, which focuses on her postmeal and she has nothing helping her with her Premeal or fasting numbers.  I then went on to emphasize to her the benefit of a GLP-1 agonist which actually is the only diabetes medication that works on both sides of the blood glucose and how the addition of this would benefit her.    Taking Medications:  Diabetes Medication(s)       Sulfonylureas       glipiZIDE (GLUCOTROL XL) 2.5 MG 24 hr tablet Take 1 tablet  (2.5 mg) by mouth daily.            Taking Medication Assessed Today: Yes  Current Treatments: Diet, Oral Medication (taken by mouth)  Problems taking diabetes medications regularly?: No  Diabetes medication side effects?: Yes (was unable to tolerate the Metformin we tried again at last visit)    Problem Solving:  Problem Solving Assessed Today: Yes  Is the patient at risk for hypoglycemia?: No  Is the patient at risk for DKA?: No  Does patient have severe weather/disaster plan for diabetes management?: Not Needed  Does patient have sick day plan for diabetes management?: Not Needed              Reducing Risks:  Reducing Risks Assessed Today: Yes  Diabetes Risks: Age over 45 years, Sedentary Lifestyle  CAD Risks: Post-menopausal, Sedentary lifestyle, Hypertension, Diabetes Mellitus, Family history  Has dilated eye exam at least once a year?: Yes  Sees dentist every 6 months?: Yes  Feet checked by healthcare provider in the last year?: Yes    Healthy Coping:  Healthy Coping Assessed Today: Yes  Emotional response to diabetes: Ready to learn  Informal Support system:: Family  Stage of change: ACTION (Actively working towards change)  Support resources: In-person Offerings  Patient Activation Measure Survey Score:       No data to display                  Care Plan and Education Provided:  Healthy Eating: Balanced meals, Consistency in amount and timing of carbohydrate intake, Eating out, and Portion control, Being Active: Amount recommended (150 minutes moderate or 75 minutes vigorous activity and 2-3 days strength training per week) and Finding a physical activity routine that works for you, Monitoring: Frequency of monitoring, Individual glucose targets, and Purpose, Taking Medication: Action of prescribed medication(s), Side effects of prescribed medication(s), and When to take medication(s), Problem Solving: High glucose - causes, signs/symptoms, treatment and prevention, Reducing Risks: Eye care, Goal for A1c,  how it relates to glucose and how often to check, Preventing cardiovascular disease, including blood pressure goals, lipid goals, recommendations for cardioprotective medications, statins, and aspirin, Prevention, early diagnostic measures and treatment of complications, and A1C - goals, relating to blood glucose levels, how often to check, and Healthy Coping: Benefits of making appropriate lifestyle changes, Identifying helpful resources, Recognize feelings about diagnosis, and Utilize support systems    Thank you,  Sandra Hill RN [facility]  Certified Diabetes Care and   Visit type : DSMT      Time Spent: 60 minutes  Encounter Type: Individual    Any diabetes medication dose changes were made via the CDE Protocol per the patient's referring provider and primary care provider. A copy of this encounter was shared with the provider.   Much or all of the text in this note was generated through the use of the Dragon Dictate voice-to-text software.Errors in spelling or words which seem out of context are unintentional. Sound alike errors, in particular, may have escaped editing.

## 2025-01-20 NOTE — PATIENT INSTRUCTIONS
1. Eat 3 balanced meals each day - Monitor carb intake and limit to 45-60 grams per meal  This would be equal to 3-4 choices ~  1 choice = 15 grams    Do not wait longer than 4-5 hours to eat something  Snacks limit to no more than 30 grams of carbohydrates or 2 choices  Make sure you include protein source with each meal and at bedtime - this has been shown to help with blood glucose elevations    2.  Check blood sugars once each day - please try and alternate the times you check between am fasting( right after you wake before eating) and 2 hours AFTER dinner - this allows us to get a better idea of what is happening throughout your day , not at just one time     Fasting and before meal target is 80 - 130   2 hours after a meal target is < 180  remember to bring meter and log book to all appointments    3. Incorporate 30 minutes activity into each day - does not need to be all at one time & walking counts    4. Take diabetes medications as prescribed Continue the Glipizide XL 2.5 mg - but take in the morning     A1c today was 7.9% VIJAYA DIAZ AWESOME !!!      Thank you for coming in to see me today !      I value your experience and would be very thankful for your time in providing feedback in our clinic survey.    You may receive an e-mail, text message or even something in the mail from [employer].  This is a survey to let us know how we are doing - the survey will be related to your diabetes education and me.

## 2025-01-24 PROBLEM — E11.69 TYPE 2 DIABETES MELLITUS WITH OTHER SPECIFIED COMPLICATION, WITHOUT LONG-TERM CURRENT USE OF INSULIN (H): Status: ACTIVE | Noted: 2022-09-08

## 2025-01-24 PROBLEM — S06.9X9A: Status: ACTIVE | Noted: 2017-07-11

## 2025-01-24 PROBLEM — Z00.00 HEALTH MAINTENANCE EXAMINATION: Status: ACTIVE | Noted: 2025-01-24

## 2025-03-17 ENCOUNTER — OFFICE VISIT (OUTPATIENT)
Dept: AUDIOLOGY | Facility: CLINIC | Age: 70
End: 2025-03-17
Attending: INTERNAL MEDICINE
Payer: MEDICARE

## 2025-03-17 DIAGNOSIS — H93.239 HYPERACUSIS, UNSPECIFIED LATERALITY: ICD-10-CM

## 2025-03-17 DIAGNOSIS — H90.8 MIXED CONDUCTIVE AND SENSORINEURAL HEARING LOSS, UNSPECIFIED LATERALITY: ICD-10-CM

## 2025-03-17 DIAGNOSIS — H90.3 SENSORINEURAL HEARING LOSS (SNHL) OF BOTH EARS: Primary | ICD-10-CM

## 2025-03-17 PROCEDURE — 92550 TYMPANOMETRY & REFLEX THRESH: CPT | Mod: 52 | Performed by: AUDIOLOGIST

## 2025-03-17 PROCEDURE — 92557 COMPREHENSIVE HEARING TEST: CPT | Performed by: AUDIOLOGIST

## 2025-03-17 NOTE — PROGRESS NOTES
AUDIOLOGY REPORT    SUBJECTIVE:  Dorita Stringer is a 69 year old female who was seen in the Audiology Clinic at the [facility] for audiologic evaluation, referred by Elodia Maravilla MD. The patient reported difficulty hearing in noisy places, especially her sister's voice. She has history of mild TBI in 2016; hearing test at that time was reportedly normal. She described some likely hyperacusis since her injury. She denied tinnitus, true vertigo, otalgia, otorrhea, recent illness, aural fullness, or history of significant noise exposure.     OBJECTIVE:  Abuse Screening:  Do you feel unsafe at home or work/school? No  Do you feel threatened by someone? No  Does anyone try to keep you from having contact with others, or doing things outside of your home? No  Physical signs of abuse present? No     Fall Risk Screen:  1. Have you fallen two or more times in the past year? No  2. Have you fallen and had an injury in the past year? No    Otoscopic exam indicates ears are clear of cerumen, bilaterally.     Pure Tone Thresholds assessed using conventional audiometry with good  reliability from 250-8000 Hz bilaterally using insert earphones and circumaural headphones.     RIGHT:  normal sloping to moderate sensorineural hearing loss    LEFT:    borderline-normal sloping to moderate sensorineural hearing loss    Tympanogram:    RIGHT: normal eardrum mobility    LEFT:   normal eardrum mobility    Reflexes for 1000 Hz (reported by stimulus ear):  RIGHT: Ipsilateral is present at normal levels  RIGHT: Contralateral could not be assessed due to equipment issues  LEFT:   Ipsilateral is absent at frequencies tested  LEFT:   Contralateral could not be assessed due to equipment issues      Speech Reception Threshold:    RIGHT: 25 dB HL    LEFT:   25 dB HL  Word Recognition Score:     RIGHT: 100% at 70 dB HL using NU-6 recorded word list.    LEFT:   100% at 65 dB HL using NU-6 recorded word  list.      ASSESSMENT:     ICD-10-CM    1. Sensorineural hearing loss (SNHL) of both ears  H90.3       2. Mixed conductive and sensorineural hearing loss, unspecified laterality  H90.8 Adult Audiology  Referral      3. Hyperacusis, unspecified laterality  H93.239           Today s results were discussed with the patient in detail. Appropriate communication strategies were discussed at length, as were reasonable expectations regarding hearing at a distance, in noisy environments, or when attention is diverted elsewhere.    Ms. Stringer is a potential candidate for amplification, but only a hearing aid trial would inform her of whether the devices are helpful or not. Given her description of hyperacusis and her hesitancy to try amplification, we decided to revisit the topic of amplification at a second visit in 1-2 years.    PLAN:  Ms. Stringer should return for hearing evaluation in 1-2 years, or per medical management/patient concern. Wear hearing protection consistently in noise to preserve residual hearing sensitivity and to minimize the effects of noise on tinnitus. Ms. Stringer expressed verbal understanding of this information and plan. Please call this clinic with questions regarding these results or recommendations.        Bruce Santos, [facility]  [place] Licensed Audiologist [id number]

## 2025-03-22 DIAGNOSIS — M1A.00X0 IDIOPATHIC CHRONIC GOUT WITHOUT TOPHUS, UNSPECIFIED SITE: ICD-10-CM

## 2025-03-24 RX ORDER — ALLOPURINOL 100 MG/1
100 TABLET ORAL DAILY
Qty: 90 TABLET | Refills: 0 | Status: SHIPPED | OUTPATIENT
Start: 2025-03-24

## 2025-03-25 DIAGNOSIS — I10 ESSENTIAL HYPERTENSION: ICD-10-CM

## 2025-03-27 RX ORDER — LOSARTAN POTASSIUM 100 MG/1
100 TABLET ORAL DAILY
Qty: 90 TABLET | Refills: 0 | Status: SHIPPED | OUTPATIENT
Start: 2025-03-27

## 2025-04-27 ENCOUNTER — HEALTH MAINTENANCE LETTER (OUTPATIENT)
Age: 70
End: 2025-04-27

## 2025-05-09 ENCOUNTER — OFFICE VISIT (OUTPATIENT)
Dept: INTERNAL MEDICINE | Facility: CLINIC | Age: 70
End: 2025-05-09
Payer: MEDICARE

## 2025-05-09 ENCOUNTER — ANCILLARY PROCEDURE (OUTPATIENT)
Dept: GENERAL RADIOLOGY | Facility: CLINIC | Age: 70
End: 2025-05-09
Attending: INTERNAL MEDICINE
Payer: MEDICARE

## 2025-05-09 VITALS
TEMPERATURE: 97.7 F | HEART RATE: 79 BPM | BODY MASS INDEX: 30.67 KG/M2 | OXYGEN SATURATION: 99 % | SYSTOLIC BLOOD PRESSURE: 150 MMHG | RESPIRATION RATE: 16 BRPM | HEIGHT: 69 IN | DIASTOLIC BLOOD PRESSURE: 93 MMHG | WEIGHT: 207.1 LBS

## 2025-05-09 DIAGNOSIS — M17.10 ARTHRITIS OF KNEE: Primary | ICD-10-CM

## 2025-05-09 DIAGNOSIS — M25.462 EFFUSION OF LEFT KNEE: ICD-10-CM

## 2025-05-09 DIAGNOSIS — M17.10 ARTHRITIS OF KNEE: ICD-10-CM

## 2025-05-09 DIAGNOSIS — Z23 NEED FOR VACCINATION: ICD-10-CM

## 2025-05-09 PROCEDURE — 91320 SARSCV2 VAC 30MCG TRS-SUC IM: CPT | Performed by: INTERNAL MEDICINE

## 2025-05-09 PROCEDURE — 73560 X-RAY EXAM OF KNEE 1 OR 2: CPT | Mod: TC | Performed by: RADIOLOGY

## 2025-05-09 PROCEDURE — 90480 ADMN SARSCOV2 VAC 1/ONLY CMP: CPT | Performed by: INTERNAL MEDICINE

## 2025-05-09 PROCEDURE — 3077F SYST BP >= 140 MM HG: CPT | Performed by: INTERNAL MEDICINE

## 2025-05-09 PROCEDURE — 99213 OFFICE O/P EST LOW 20 MIN: CPT | Performed by: INTERNAL MEDICINE

## 2025-05-09 PROCEDURE — 3080F DIAST BP >= 90 MM HG: CPT | Performed by: INTERNAL MEDICINE

## 2025-05-09 RX ORDER — MELOXICAM 15 MG/1
15 TABLET ORAL DAILY
Qty: 30 TABLET | Refills: 1 | Status: SHIPPED | OUTPATIENT
Start: 2025-05-09

## 2025-05-09 NOTE — PROGRESS NOTES
"  {PROVIDER CHARTING PREFERENCE:678043}    Subjective   Dorita is a 69 year old, presenting for the following health issues:  New swelling and it wakes her up with pain, not hot   GFR Estimate   Date Value Ref Range Status   01/20/2025 72 >60 mL/min/1.73m2 Final     Comment:     eGFR calculated using 2021 CKD-EPI equation.   01/03/2024 73 >60 mL/min/1.73m2 Final   06/26/2023 74 >60 mL/min/1.73m2 Final   07/16/2020 >60 >60 mL/min/1.73m2 Final   07/12/2019 >60 >60 mL/min/1.73m2 Final   05/07/2018 >60 >60 mL/min/1.73m2 Final       Swelling (Swelling/bump on left knee)      5/9/2025    10:49 AM   Additional Questions   Roomed by Steffi   Accompanied by Alone     History of Present Illness       Reason for visit:  Follow up She is missing 1 dose(s) of medications per week.        {MA/LPN/RN Pre-Provider Visit Orders- hCG/UA/Strep (Optional):968971}  {SUPERLIST (Optional):184647}  {additonal problems for provider to add (Optional):785493}    {ROS Picklists (Optional):188230}      Objective    BP (!) 150/93 (BP Location: Left arm, Patient Position: Sitting, Cuff Size: Adult Large)   Pulse 79   Temp 97.7  F (36.5  C) (Oral)   Resp 16   Ht 1.745 m (5' 8.7\")   Wt 93.9 kg (207 lb 1.6 oz)   LMP  (LMP Unknown)   SpO2 99%   BMI 30.85 kg/m    Body mass index is 30.85 kg/m .  Physical Exam   {Exam List (Optional):747564}    {Diagnostic Test Results (Optional):868253}        Signed Electronically by: Elodia Maravilla MD  {Email feedback regarding this note to [email or web address]   :720063}  " is 30.85 kg/m .  Physical Exam   Musculoskeletal exam she has an effusion of the left and anterior early medial medial knee.  There is no warmth she has full range of motion of the knee            Signed Electronically by: Elodia Maravilla MD

## 2025-05-09 NOTE — NURSING NOTE
Patient tolerated Covid injection without incident. Injection site free from redness, swelling, and burning. Patient discharged in the care of x-ray. Patient aware of next appointment.

## 2025-05-10 ENCOUNTER — RESULTS FOLLOW-UP (OUTPATIENT)
Dept: INTERNAL MEDICINE | Facility: CLINIC | Age: 70
End: 2025-05-10

## 2025-05-12 ENCOUNTER — PATIENT OUTREACH (OUTPATIENT)
Dept: CARE COORDINATION | Facility: CLINIC | Age: 70
End: 2025-05-12
Payer: MEDICARE

## 2025-05-13 NOTE — PROGRESS NOTES
ASSESSMENT & PLAN     Today we discussed the underlying etiology/pathology of patient's   1. Acute pain of left knee, medial, suspect degenerative medial meniscus tear    2. Hx of traumatic brain injury, with right leg gait abnormality/weakness    3. Gout of knee, unspecified cause, unspecified chronicity, unspecified laterality    4. Type 2 diabetes mellitus with other specified complication, without long-term current use of insulin (H), last HgbA1c 7.9        Today a shared decision making model was used. The patient's values and choices were respected. The following information represents what was discussed and decided upon by the provider and the patient.  - We discussed the patient's been dealing with acute left knee pain for the last 6 weeks without known injury or trauma.  Pain isolated to the medial side of her knee with occasional symptoms that the knee will buckle or give out usually with transitional motions from a seated to standing position.  - We discussed the patient previously had 2 view x-ray of the left knee showing no significant high-grade arthritis, fracture or other abnormality.  Third view of the patellofemoral joint was not obtained from previous visit but on physical exam there is no abnormality noted of the patellofemoral joint warranting updated x-ray today.  - We discussed her physical exam today shows medial joint line tenderness with positive circumduction maneuvers indicating possible medial meniscus tear.  We discussed that formal diagnosis of this would require an MRI.  - We discussed previous treatment which included compressive knee sleeve which patient wore initially giving some temporary support but caused significant swelling through the open patella aspect of the brace which concerned the patient and so therefore this was discontinued.  Patient was prescribed meloxicam 15 mg tablet to be taken daily by her primary care provider but patient has not started this medication due to  concerns previous medication side effect requiring ED visit and hospitalization.  - X-rays reviewed with the patient which she understood.  Physical exam discussed indicating likely medial meniscus tear without evidence of high-grade arthritis medial/lateral knee joint or partially visualized patellofemoral joint.  -We discussed treatment options ranging from appropriate rest, activity modification, trial of a different hinged knee brace for support and protection, continuation of previous compression knee sleeve, use of prescription anti-inflammatory medication, consideration of intra-articular corticosteroid injection and proceeding with requesting MRI scan of the left knee to confirm medial meniscus tear.  - At this time we will proceed with ordering a left knee MRI to evaluate for medial meniscus tear as well as any significant underlying arthritis not noted radiographically on plain films.  Patient understood MRI will need to be authorized by insurance and then she will receive a scheduling phone call from radiology.  Patient and I agreed that I will contact her via [employer] or telephone with MRI results.  - Patient is deferring all other treatment at this time.  She will start her meloxicam as previously prescribed.  I recommend she take her first dose this evening to ensure she does not have side effects.  Patient leaves tomorrow morning on a trip to [place] for 2 weeks.  - Patient was trialed with a hinged knee brace today for the left knee and patient stated this provided her better pain control and more support.  Brace was dispensed today.  -Treatment plan in the future will be based on her response to meloxicam, bracing and MRI results.  - We did discuss that she has a history of gout diagnosed at [facility] in the past and she has continued with allopurinol.  She states gout flare affected her knee but does not recall which knee was affected.  Currently there is no evidence of gout  involvement for her left knee condition.  -We did discuss patient had a mild traumatic brain injury which has resulted in chronic right lower extremity weakness undergoing significant formal physical therapy to relearn gait and strength training.     - Appointment line phone number is [[phone]]     Gigi Vences PA-C  [facility]    This note was completed in part using a voice recognition software, any grammatical or context distortion are unintentional and inherent to the software.           SUBJECTIVE  Dorita Stringer is a/an 69 year old female who is seen in consultation at the request of  Elodia Maravilla M.D. for evaluation of left knee pain. The patient is seen by themselves.    Expanded HPI: Patient has a history of mild traumatic brain injury resulting in significant right-sided lower extremity weakness requiring significant rehabilitation to learn to walk and strengthening.  She now is ambulating without assistive device.  She denies any upper extremity symptoms.  Patient also has a history of gout diagnosed at [facility] years ago and patient has continued with allopurinol.  Patient does not recall if it was her right or left knee that was involved with gout.  She has no other history of joint involvement with gout diagnosis.  No recent uric acid level drawn.  Pain is isolated to the medial side of her left knee worse with transitional motions from a seated to standing position.  She states that she has intermittent symptoms with knee will catch her feelings and give out but no history of fall.  She was seen by primary care provider and provided a prescription for meloxicam which she has not started.  She had a compression knee sleeve provided which worsened her knee swelling.  She uses intermittent ice to her knee.    Onset: 6 week(s) ago. Reports insidious onset without acute precipitating event.  Location of Pain: left knee, medial joint line.   Rating of Pain at  worst: 5/10  Rating of Pain Currently: 2/10  Worsened by: wearing the sleeve (caused increased knee swelling), trying to sleeve, going from sit to stand.   Better with: ice, self, massage  Treatments tried:  xray 05/09/2025, knee sleeve, ice, tylenol.   Quality: aching, sharp  Associated symptoms: swelling  Orthopedic history related to this area/condition: NO  Relevant surgical history for this area: NO  Social history: part-time - no duties impacted. Traveling this weekend to [place]. Enjoys swimming- has avoided this.     Past Medical History:   Diagnosis Date    Acne     Allergic rhinitis     Gout     Hx of radiation therapy     Hyperlipemia     Idiopathic urticaria 10/31/2016    Irritable bowel syndrome     Malignant neoplasm of right breast (H) 8/25/2016    Migraine     Mild traumatic brain injury (H) 7/11/2017    Rosacea      Social History     Socioeconomic History    Marital status: Single   Tobacco Use    Smoking status: Some Days     Current packs/day: 0.10     Average packs/day: 0.1 packs/day for 20.0 years (2.0 ttl pk-yrs)     Types: Cigarettes     Passive exposure: Never    Smokeless tobacco: Never   Vaping Use    Vaping status: Never Used   Substance and Sexual Activity    Alcohol use: Not Currently     Alcohol/week: 2.0 standard drinks of alcohol     Types: 2 Standard drinks or equivalent per week    Drug use: No    Sexual activity: Not Currently   Social History Narrative    She is single, lives alone, and does not have children.  She does not smoke cigarettes and occasionally has an alcoholic beverage. She has worked as independent  and part-time at [employer].  She became disabled in August 2016.     Social Drivers of Health     Financial Resource Strain: Low Risk  (3/24/2024)    [facility]    Financial Resource Strain     Difficulty of Paying Living Expenses: 3   Food Insecurity: No Food Insecurity (3/24/2024)     Received from [facility]    Food Insecurity     Do you worry your food will run out before you are able to buy more?: 1   Transportation Needs: No Transportation Needs (3/24/2024)    Received from [facility]    Transportation Needs     Does lack of transportation keep you from medical appointments?: 1     Does lack of transportation keep you from work, meetings or getting things that you need?: 1   Social Connections: Socially Integrated (3/24/2024)    Received from [facility]    Social Connections     Do you often feel lonely or isolated from those around you?: 0   Interpersonal Safety: Low Risk  (1/24/2025)    Interpersonal Safety     Do you feel physically and emotionally safe where you currently live?: Yes     Within the past 12 months, have you been hit, slapped, kicked or otherwise physically hurt by someone?: No     Within the past 12 months, have you been humiliated or emotionally abused in other ways by your partner or ex-partner?: No   Housing Stability: Low Risk  (3/24/2024)    Received from [facility]    Housing Stability     What is your housing situation today?: 1         Patient's past medical, surgical, social, and family histories were personally reviewed today and no changes are noted.    REVIEW OF SYSTEMS:  10 point ROS is negative other than symptoms noted above in HPI, Past Medical History or as stated below  Constitutional: NEGATIVE for fever, chills, change in weight  Skin: NEGATIVE for worrisome rashes, moles or lesions  GI/: NEGATIVE for bowel or bladder changes  Neuro: NEGATIVE for weakness, dizziness or paresthesias    OBJECTIVE:  Vital signs as noted in EPIC for 5/13/2025  General: healthy, alert and in no distress  HEENT: no scleral icterus or conjunctival erythema  Skin: no suspicious lesions or rash. No jaundice.  CV: no pedal edema  Resp: normal  respiratory effort without conversational dyspnea   Psych: normal mood and affect  Neuro: Normal light sensory exam of lower extremity      MSK:  Exam shows a 69-year-old female who ambulates full weightbearing without assistive device.  Alert and oriented x 3.  Examination of the left knee shows no warmth.  Trace effusion.  She has full knee extension and flexion past 130 degrees without crepitation or pain.  Normal motor tone of the quadriceps and hamstrings.  Tenderness noted along the medial joint into palpation as well as slightly over the medial femoral condyle.  No pain on the medial tibial plateau.  Remaining knee anterior, lateral and posterior are nontender along the joint lines and bony prominences.  Ligament exam is stable at 0 and 30 degrees of flexion of MCL and LCL without pain or laxity.  Anterior and posterior drawer are stable including normal Lachman's.  Melissa's test reproduces medial isolated joint pain.  Lateral joint line does not reproduce pain with Melissa's.  Hip motion is uniform symmetric and pain-free with negative FADIR testing reproducing hip or knee pain.  Normal motor tone of iliopsoas muscle on the left.  Normal quadricep and hamstring tone.  No pain throughout the gastrocsoleus complex to palpation with no peripheral edema.  Normal range of motion of foot and ankle without reproduction of symptoms.  Neurovascularly intact L2-S1 bilaterally on the left lower extremity.          RADIOLOGY AND LABORATORY:   Personal Independent visualization of the below images done today:  Previous 2 view x-rays of the left knee reviewed today personally and with the patient.  Patient shows very mild age-appropriate degenerative changes of the patellofemoral joint which is only noted on lateral view.  No merchant view obtained.  Medial and lateral joint line spaces maintained with no evidence of effusion.  Results for orders placed or performed in visit on 05/09/25   XR Knee Standing Left 2 Views     Narrative    EXAM: XR KNEE STANDING LEFT 2 VIEWS  LOCATION: [facility]  DATE: 5/9/2025    INDICATION:  Arthritis of knee, effusion of left knee.  COMPARISON: None.      Impression    IMPRESSION: Mild patellofemoral osteoarthrosis. There is probably a small calcified intra-articular body in the posterior aspect of the joint. No fracture or effusion.       Patient's conditions were thoroughly discussed during today's clinical visit with total time reviewing patient's previous medical records/history/radiology, face-to-face examination and discussion and plan of care with the patient and documentation being 45 minutes on today's clinical visit  Gigi Vences PA-C  [facility]    This note was completed in part using a voice recognition software, any grammatical or context distortion are unintentional and inherent to the software.

## 2025-05-14 ENCOUNTER — OFFICE VISIT (OUTPATIENT)
Dept: ORTHOPEDICS | Facility: CLINIC | Age: 70
End: 2025-05-14
Attending: INTERNAL MEDICINE
Payer: MEDICARE

## 2025-05-14 DIAGNOSIS — M10.9 GOUT OF KNEE, UNSPECIFIED CAUSE, UNSPECIFIED CHRONICITY, UNSPECIFIED LATERALITY: Chronic | ICD-10-CM

## 2025-05-14 DIAGNOSIS — E11.69 TYPE 2 DIABETES MELLITUS WITH OTHER SPECIFIED COMPLICATION, WITHOUT LONG-TERM CURRENT USE OF INSULIN (H): ICD-10-CM

## 2025-05-14 DIAGNOSIS — M25.562 ACUTE PAIN OF LEFT KNEE: Primary | ICD-10-CM

## 2025-05-14 DIAGNOSIS — Z87.820 HX OF TRAUMATIC BRAIN INJURY: ICD-10-CM

## 2025-05-14 PROCEDURE — 99204 OFFICE O/P NEW MOD 45 MIN: CPT | Performed by: PHYSICIAN ASSISTANT

## 2025-05-14 RX ORDER — GLIMEPIRIDE 1 MG/1
1 TABLET ORAL
COMMUNITY
Start: 2024-03-24

## 2025-05-14 RX ORDER — SODIUM CHLORIDE 50 MG/ML
1 SOLUTION OPHTHALMIC PRN
COMMUNITY
Start: 2024-12-30

## 2025-05-14 RX ORDER — LISINOPRIL AND HYDROCHLOROTHIAZIDE 20; 25 MG/1; MG/1
1 TABLET ORAL DAILY
COMMUNITY

## 2025-05-14 SDOH — HEALTH STABILITY: PHYSICAL HEALTH: ON AVERAGE, HOW MANY DAYS PER WEEK DO YOU ENGAGE IN MODERATE TO STRENUOUS EXERCISE (LIKE A BRISK WALK)?: 4 DAYS

## 2025-05-14 NOTE — PATIENT INSTRUCTIONS
Thank you for allowing me to be part of your care team. My personal goal for your visit today is that you felt that I listened to you, you understood your diagnosis and treatment options and our staff/clinic met your expectations. We strive to provide you excellent care.  If you felt like your expectations were not met at your visit today or if you have further questions about your visit or care, please send me a [employer] message and I would be happy answer any questions or listen to your feedback.  If you do not have [employer], you can call [phone] to speak with me.      If advanced imaging (MRI, CT scan) or blood work was ordered at your appointment today, I will contact you as we discussed today either by telephone call or [employer] message within 48 hours after your advanced imaging testing has been completed or when ALL your lab results are available to review/discuss with you.       Today we discussed the underlying etiology/pathology of patient's   1. Acute pain of left knee, medial, suspect degenerative medial meniscus tear    2. Hx of traumatic brain injury, with right leg gait abnormality/weakness    3. Gout of knee, unspecified cause, unspecified chronicity, unspecified laterality    4. Type 2 diabetes mellitus with other specified complication, without long-term current use of insulin (H), last HgbA1c 7.9        Today a shared decision making model was used. The patient's values and choices were respected. The following information represents what was discussed and decided upon by the provider and the patient.  - We discussed the patient's been dealing with acute left knee pain for the last 6 weeks without known injury or trauma.  Pain isolated to the medial side of her knee with occasional symptoms that the knee will buckle or give out usually with transitional motions from a seated to standing position.  - We discussed the patient previously had 2 view x-ray of the left knee showing no significant  high-grade arthritis, fracture or other abnormality.  Third view of the patellofemoral joint was not obtained from previous visit but on physical exam there is no abnormality noted of the patellofemoral joint warranting updated x-ray today.  - We discussed her physical exam today shows medial joint line tenderness with positive circumduction maneuvers indicating possible medial meniscus tear.  We discussed that formal diagnosis of this would require an MRI.  - We discussed previous treatment which included compressive knee sleeve which patient wore initially giving some temporary support but caused significant swelling through the open patella aspect of the brace which concerned the patient and so therefore this was discontinued.  Patient was prescribed meloxicam 15 mg tablet to be taken daily by her primary care provider but patient has not started this medication due to concerns previous medication side effect requiring ED visit and hospitalization.  - X-rays reviewed with the patient which she understood.  Physical exam discussed indicating likely medial meniscus tear without evidence of high-grade arthritis medial/lateral knee joint or partially visualized patellofemoral joint.  -We discussed treatment options ranging from appropriate rest, activity modification, trial of a different hinged knee brace for support and protection, continuation of previous compression knee sleeve, use of prescription anti-inflammatory medication, consideration of intra-articular corticosteroid injection and proceeding with requesting MRI scan of the left knee to confirm medial meniscus tear.  - At this time we will proceed with ordering a left knee MRI to evaluate for medial meniscus tear as well as any significant underlying arthritis not noted radiographically on plain films.  Patient understood MRI will need to be authorized by insurance and then she will receive a scheduling phone call from radiology.  Patient and I agreed that  I will contact her via [employer] or telephone with MRI results.  - Patient is deferring all other treatment at this time.  She will start her meloxicam as previously prescribed.  I recommend she take her first dose this evening to ensure she does not have side effects.  Patient leaves tomorrow morning on a trip to [place] for 2 weeks.  - Patient was trialed with a hinged knee brace today for the left knee and patient stated this provided her better pain control and more support.  Brace was dispensed today.  -Treatment plan in the future will be based on her response to meloxicam, bracing and MRI results.  - We did discuss that she has a history of gout diagnosed at [facility] in the past and she has continued with allopurinol.  She states gout flare affected her knee but does not recall which knee was affected.  Currently there is no evidence of gout involvement for her left knee condition.  -We did discuss patient had a mild traumatic brain injury which has resulted in chronic right lower extremity weakness undergoing significant formal physical therapy to relearn gait and strength training.     - Appointment line phone number is [[phone]]     Gigi Vences PA-C  [facility]    This note was completed in part using a voice recognition software, any grammatical or context distortion are unintentional and inherent to the software.

## 2025-05-14 NOTE — LETTER
5/14/2025      Dorita Stringer  [address] [place] [address]      Dear Colleague,    Thank you for referring your patient, Dorita Stringer, to the [facility]. Please see a copy of my visit note below.    ASSESSMENT & PLAN     Today we discussed the underlying etiology/pathology of patient's   1. Acute pain of left knee, medial, suspect degenerative medial meniscus tear    2. Hx of traumatic brain injury, with right leg gait abnormality/weakness    3. Gout of knee, unspecified cause, unspecified chronicity, unspecified laterality    4. Type 2 diabetes mellitus with other specified complication, without long-term current use of insulin (H), last HgbA1c 7.9        Today a shared decision making model was used. The patient's values and choices were respected. The following information represents what was discussed and decided upon by the provider and the patient.  - We discussed the patient's been dealing with acute left knee pain for the last 6 weeks without known injury or trauma.  Pain isolated to the medial side of her knee with occasional symptoms that the knee will buckle or give out usually with transitional motions from a seated to standing position.  - We discussed the patient previously had 2 view x-ray of the left knee showing no significant high-grade arthritis, fracture or other abnormality.  Third view of the patellofemoral joint was not obtained from previous visit but on physical exam there is no abnormality noted of the patellofemoral joint warranting updated x-ray today.  - We discussed her physical exam today shows medial joint line tenderness with positive circumduction maneuvers indicating possible medial meniscus tear.  We discussed that formal diagnosis of this would require an MRI.  - We discussed previous treatment which included compressive knee sleeve which patient wore initially giving some temporary support but  caused significant swelling through the open patella aspect of the brace which concerned the patient and so therefore this was discontinued.  Patient was prescribed meloxicam 15 mg tablet to be taken daily by her primary care provider but patient has not started this medication due to concerns previous medication side effect requiring ED visit and hospitalization.  - X-rays reviewed with the patient which she understood.  Physical exam discussed indicating likely medial meniscus tear without evidence of high-grade arthritis medial/lateral knee joint or partially visualized patellofemoral joint.  -We discussed treatment options ranging from appropriate rest, activity modification, trial of a different hinged knee brace for support and protection, continuation of previous compression knee sleeve, use of prescription anti-inflammatory medication, consideration of intra-articular corticosteroid injection and proceeding with requesting MRI scan of the left knee to confirm medial meniscus tear.  - At this time we will proceed with ordering a left knee MRI to evaluate for medial meniscus tear as well as any significant underlying arthritis not noted radiographically on plain films.  Patient understood MRI will need to be authorized by insurance and then she will receive a scheduling phone call from radiology.  Patient and I agreed that I will contact her via [employer] or telephone with MRI results.  - Patient is deferring all other treatment at this time.  She will start her meloxicam as previously prescribed.  I recommend she take her first dose this evening to ensure she does not have side effects.  Patient leaves tomorrow morning on a trip to [place] for 2 weeks.  - Patient was trialed with a hinged knee brace today for the left knee and patient stated this provided her better pain control and more support.  Brace was dispensed today.  -Treatment plan in the future will be based on her response to meloxicam,  bracing and MRI results.  - We did discuss that she has a history of gout diagnosed at [facility] in the past and she has continued with allopurinol.  She states gout flare affected her knee but does not recall which knee was affected.  Currently there is no evidence of gout involvement for her left knee condition.  -We did discuss patient had a mild traumatic brain injury which has resulted in chronic right lower extremity weakness undergoing significant formal physical therapy to relearn gait and strength training.     - Appointment line phone number is [[phone]]     Gigi Vences PA-C  [facility]    This note was completed in part using a voice recognition software, any grammatical or context distortion are unintentional and inherent to the software.           SUBJECTIVE  Dorita Stringer is a/an 69 year old female who is seen in consultation at the request of  Elodia Maravilla M.D. for evaluation of left knee pain. The patient is seen by themselves.    Expanded HPI: Patient has a history of mild traumatic brain injury resulting in significant right-sided lower extremity weakness requiring significant rehabilitation to learn to walk and strengthening.  She now is ambulating without assistive device.  She denies any upper extremity symptoms.  Patient also has a history of gout diagnosed at [facility] years ago and patient has continued with allopurinol.  Patient does not recall if it was her right or left knee that was involved with gout.  She has no other history of joint involvement with gout diagnosis.  No recent uric acid level drawn.  Pain is isolated to the medial side of her left knee worse with transitional motions from a seated to standing position.  She states that she has intermittent symptoms with knee will catch her feelings and give out but no history of fall.  She was seen by primary care provider and provided a prescription for meloxicam which she has  not started.  She had a compression knee sleeve provided which worsened her knee swelling.  She uses intermittent ice to her knee.    Onset: 6 week(s) ago. Reports insidious onset without acute precipitating event.  Location of Pain: left knee, medial joint line.   Rating of Pain at worst: 5/10  Rating of Pain Currently: 2/10  Worsened by: wearing the sleeve (caused increased knee swelling), trying to sleeve, going from sit to stand.   Better with: ice, self, massage  Treatments tried:  xray 05/09/2025, knee sleeve, ice, tylenol.   Quality: aching, sharp  Associated symptoms: swelling  Orthopedic history related to this area/condition: NO  Relevant surgical history for this area: NO  Social history: part-time - no duties impacted. Traveling this weekend to [place]. Enjoys swimming- has avoided this.     Past Medical History:   Diagnosis Date     Acne      Allergic rhinitis      Gout      Hx of radiation therapy      Hyperlipemia      Idiopathic urticaria 10/31/2016     Irritable bowel syndrome      Malignant neoplasm of right breast (H) 8/25/2016     Migraine      Mild traumatic brain injury (H) 7/11/2017     Rosacea      Social History     Socioeconomic History     Marital status: Single   Tobacco Use     Smoking status: Some Days     Current packs/day: 0.10     Average packs/day: 0.1 packs/day for 20.0 years (2.0 ttl pk-yrs)     Types: Cigarettes     Passive exposure: Never     Smokeless tobacco: Never   Vaping Use     Vaping status: Never Used   Substance and Sexual Activity     Alcohol use: Not Currently     Alcohol/week: 2.0 standard drinks of alcohol     Types: 2 Standard drinks or equivalent per week     Drug use: No     Sexual activity: Not Currently   Social History Narrative    She is single, lives alone, and does not have children.  She does not smoke cigarettes and occasionally has an alcoholic beverage. She has worked as independent  and part-time at [employer].   She became disabled in August 2016.     Social Drivers of Health     Financial Resource Strain: Low Risk  (3/24/2024)    Received from [facility]    Financial Resource Strain      Difficulty of Paying Living Expenses: 3   Food Insecurity: No Food Insecurity (3/24/2024)    Received from [facility]    Food Insecurity      Do you worry your food will run out before you are able to buy more?: 1   Transportation Needs: No Transportation Needs (3/24/2024)    Received from [facility]    Transportation Needs      Does lack of transportation keep you from medical appointments?: 1      Does lack of transportation keep you from work, meetings or getting things that you need?: 1   Social Connections: Socially Integrated (3/24/2024)    Received from [facility]    Social Connections      Do you often feel lonely or isolated from those around you?: 0   Interpersonal Safety: Low Risk  (1/24/2025)    Interpersonal Safety      Do you feel physically and emotionally safe where you currently live?: Yes      Within the past 12 months, have you been hit, slapped, kicked or otherwise physically hurt by someone?: No      Within the past 12 months, have you been humiliated or emotionally abused in other ways by your partner or ex-partner?: No   Housing Stability: Low Risk  (3/24/2024)    Received from [facility]    Housing Stability      What is your housing situation today?: 1         Patient's past medical, surgical, social, and family histories were personally reviewed today and no changes are noted.    REVIEW OF SYSTEMS:  10 point ROS is negative other than symptoms noted above in HPI, Past Medical History or as stated below  Constitutional: NEGATIVE for fever, chills, change in weight  Skin: NEGATIVE for worrisome rashes, moles or lesions  GI/: NEGATIVE for bowel or  bladder changes  Neuro: NEGATIVE for weakness, dizziness or paresthesias    OBJECTIVE:  Vital signs as noted in EPIC for 5/13/2025  General: healthy, alert and in no distress  HEENT: no scleral icterus or conjunctival erythema  Skin: no suspicious lesions or rash. No jaundice.  CV: no pedal edema  Resp: normal respiratory effort without conversational dyspnea   Psych: normal mood and affect  Neuro: Normal light sensory exam of lower extremity      MSK:  Exam shows a 69-year-old female who ambulates full weightbearing without assistive device.  Alert and oriented x 3.  Examination of the left knee shows no warmth.  Trace effusion.  She has full knee extension and flexion past 130 degrees without crepitation or pain.  Normal motor tone of the quadriceps and hamstrings.  Tenderness noted along the medial joint into palpation as well as slightly over the medial femoral condyle.  No pain on the medial tibial plateau.  Remaining knee anterior, lateral and posterior are nontender along the joint lines and bony prominences.  Ligament exam is stable at 0 and 30 degrees of flexion of MCL and LCL without pain or laxity.  Anterior and posterior drawer are stable including normal Lachman's.  Melissa's test reproduces medial isolated joint pain.  Lateral joint line does not reproduce pain with Melissa's.  Hip motion is uniform symmetric and pain-free with negative FADIR testing reproducing hip or knee pain.  Normal motor tone of iliopsoas muscle on the left.  Normal quadricep and hamstring tone.  No pain throughout the gastrocsoleus complex to palpation with no peripheral edema.  Normal range of motion of foot and ankle without reproduction of symptoms.  Neurovascularly intact L2-S1 bilaterally on the left lower extremity.          RADIOLOGY AND LABORATORY:   Personal Independent visualization of the below images done today:  Previous 2 view x-rays of the left knee reviewed today personally and with the patient.  Patient shows  very mild age-appropriate degenerative changes of the patellofemoral joint which is only noted on lateral view.  No merchant view obtained.  Medial and lateral joint line spaces maintained with no evidence of effusion.  Results for orders placed or performed in visit on 05/09/25   XR Knee Standing Left 2 Views    Narrative    EXAM: XR KNEE STANDING LEFT 2 VIEWS  LOCATION: [facility]  DATE: 5/9/2025    INDICATION:  Arthritis of knee, effusion of left knee.  COMPARISON: None.      Impression    IMPRESSION: Mild patellofemoral osteoarthrosis. There is probably a small calcified intra-articular body in the posterior aspect of the joint. No fracture or effusion.       Patient's conditions were thoroughly discussed during today's clinical visit with total time reviewing patient's previous medical records/history/radiology, face-to-face examination and discussion and plan of care with the patient and documentation being 45 minutes on today's clinical visit  Gigi Vences PA-C  [facility]    This note was completed in part using a voice recognition software, any grammatical or context distortion are unintentional and inherent to the software.     Again, thank you for allowing me to participate in the care of your patient.        Sincerely,        Gigi Vences PA-C    Electronically signed

## 2025-06-02 ENCOUNTER — LAB (OUTPATIENT)
Dept: LAB | Facility: CLINIC | Age: 70
End: 2025-06-02
Payer: MEDICARE

## 2025-06-02 ENCOUNTER — ALLIED HEALTH/NURSE VISIT (OUTPATIENT)
Dept: EDUCATION SERVICES | Facility: CLINIC | Age: 70
End: 2025-06-02
Payer: MEDICARE

## 2025-06-02 VITALS — WEIGHT: 207.2 LBS | BODY MASS INDEX: 30.87 KG/M2

## 2025-06-02 DIAGNOSIS — E78.2 MIXED HYPERLIPIDEMIA: ICD-10-CM

## 2025-06-02 DIAGNOSIS — E11.69 TYPE 2 DIABETES MELLITUS WITH OTHER SPECIFIED COMPLICATION, WITHOUT LONG-TERM CURRENT USE OF INSULIN (H): ICD-10-CM

## 2025-06-02 DIAGNOSIS — E11.69 TYPE 2 DIABETES MELLITUS WITH OTHER SPECIFIED COMPLICATION, WITHOUT LONG-TERM CURRENT USE OF INSULIN (H): Primary | ICD-10-CM

## 2025-06-02 LAB
ALBUMIN SERPL BCG-MCNC: 4.2 G/DL (ref 3.5–5.2)
ALP SERPL-CCNC: 123 U/L (ref 40–150)
ALT SERPL W P-5'-P-CCNC: 48 U/L (ref 0–50)
ANION GAP SERPL CALCULATED.3IONS-SCNC: 11 MMOL/L (ref 7–15)
AST SERPL W P-5'-P-CCNC: 38 U/L (ref 0–45)
BILIRUB SERPL-MCNC: 0.3 MG/DL
BUN SERPL-MCNC: 13.1 MG/DL (ref 8–23)
CALCIUM SERPL-MCNC: 9.6 MG/DL (ref 8.8–10.4)
CHLORIDE SERPL-SCNC: 102 MMOL/L (ref 98–107)
CHOLEST SERPL-MCNC: 177 MG/DL
CK SERPL-CCNC: 52 U/L (ref 26–192)
CREAT SERPL-MCNC: 0.92 MG/DL (ref 0.51–0.95)
EGFRCR SERPLBLD CKD-EPI 2021: 67 ML/MIN/1.73M2
EST. AVERAGE GLUCOSE BLD GHB EST-MCNC: 169 MG/DL
FASTING STATUS PATIENT QL REPORTED: NO
FASTING STATUS PATIENT QL REPORTED: NO
GLUCOSE SERPL-MCNC: 270 MG/DL (ref 70–99)
HBA1C MFR BLD: 7.5 % (ref 0–5.6)
HCO3 SERPL-SCNC: 24 MMOL/L (ref 22–29)
HDLC SERPL-MCNC: 53 MG/DL
LDLC SERPL CALC-MCNC: 83 MG/DL
NONHDLC SERPL-MCNC: 124 MG/DL
POTASSIUM SERPL-SCNC: 4.9 MMOL/L (ref 3.4–5.3)
PROT SERPL-MCNC: 7.1 G/DL (ref 6.4–8.3)
SODIUM SERPL-SCNC: 137 MMOL/L (ref 135–145)
TRIGL SERPL-MCNC: 206 MG/DL
URATE SERPL-MCNC: 4.3 MG/DL (ref 2.4–5.7)

## 2025-06-02 PROCEDURE — 82550 ASSAY OF CK (CPK): CPT

## 2025-06-02 PROCEDURE — 80061 LIPID PANEL: CPT

## 2025-06-02 PROCEDURE — 3048F LDL-C <100 MG/DL: CPT

## 2025-06-02 PROCEDURE — 80053 COMPREHEN METABOLIC PANEL: CPT

## 2025-06-02 PROCEDURE — 36415 COLL VENOUS BLD VENIPUNCTURE: CPT

## 2025-06-02 PROCEDURE — G0108 DIAB MANAGE TRN  PER INDIV: HCPCS

## 2025-06-02 PROCEDURE — 3051F HG A1C>EQUAL 7.0%<8.0%: CPT

## 2025-06-02 PROCEDURE — 99207 PR NO CHARGE NURSE ONLY: CPT

## 2025-06-02 PROCEDURE — 84550 ASSAY OF BLOOD/URIC ACID: CPT

## 2025-06-02 PROCEDURE — 83036 HEMOGLOBIN GLYCOSYLATED A1C: CPT

## 2025-06-02 RX ORDER — GLIPIZIDE 5 MG/1
5 TABLET, FILM COATED, EXTENDED RELEASE ORAL DAILY
Qty: 90 TABLET | Refills: 1 | Status: SHIPPED | OUTPATIENT
Start: 2025-06-02

## 2025-06-02 NOTE — PATIENT INSTRUCTIONS
1. Eat 3 balanced meals each day - Monitor carb intake and limit to 45-60 grams per meal  This would be equal to 3-4 choices ~  1 choice = 15 grams    Do not wait longer than 4-5 hours to eat something- ESPECIALLY CAUSE YOU ARE TAKING THE GLIPIZIDE  Snacks limit to no more than 30 grams of carbohydrates or 2 choices  **Make sure you include protein source with each meal and at bedtime - this has been shown to help with blood glucose elevations    FIND OLD HOME !!!!     2.  Check blood sugars once each day - please try and alternate the times you check between am fasting( right after you wake before eating) and 2 hours AFTER dinner - this allows us to get a better idea of what is happening throughout your day , not at just one time     Fasting and before meal target is 80 - 130   2 hours after a meal target is < 180  remember to bring meter and log book to all appointments    3. Incorporate 30 minutes activity into each day - does not need to be all at one time & walking counts    4. Take diabetes medications as prescribed we are increasing Glipizide to 5 mg once daily - IF you notice ANY signs of low blood sugar let me know and cut pill in half     Your A1c result today was 7.5%   YAHOOOOOOO DOWN FROM 7.9% !!! YOU GO GIRLFRIEND    Our Triage numbers for questions/ concerns or appointments are [phone] or [phone]  Thank you for coming in to see me today !      I value your experience and would be very thankful for your time in providing feedback in our clinic survey.    You may receive an e-mail, text message or even something in the mail from [employer].  This is a survey to let us know how we are doing - the survey will be related to your diabetes education and me.

## 2025-06-02 NOTE — LETTER
6/2/2025         RE: Dorita Stringer  [address]  [place] [address]        Dear Colleague,    Thank you for referring your patient, Dorita Stringer, to the [facility]. Please see a copy of my visit note below.    Diabetes Self-Management Education & Support    Presents for: Individual review    Type of Service: In Person Visit      Assessment  Dorita is a very pleasant 69-year-old who returns to clinic today to review blood glucose, medications, recheck A1c, and to assess/assist her with her current diabetes self-management skills with an A1c not meeting ADA goal.  She arrived today unaccompanied.  Diabetes medications were reviewed and she confirms she continues to take 2.5 mg of glipizide XL daily.  She reports very few missed or skipped doses-maybe once or twice every 2 weeks.  Prior to her visit today we did recheck her A1c and was pleased to see it did return lower than her previous 7.9% to 7.5% today.  I congratulated her on this.  She had a glucose log book with her today and we went over her readings.  Informed her I would like to see her fasting readings just slightly lower, more in the 130-140 range.  For me today she had just returned from a vacation to [place] 2 days ago.  While she was gone it was often difficult to maintain healthy eating patterns and habits.  The friend she was with was taking a GLP-1 for weight loss and often did not want to eat anything.  Dorita inform me she found this very challenging.  That she is back home she is eager to get back on track.  I commended her on this and encouraged her to keep working on things.  We agreed to meet back in clinic when her next A1c is due and I instructed her to call with any questions or concerns that come up before that time.    Patient's most recent   Lab Results   Component Value Date    A1C 7.5 06/02/2025     is meeting goal of 7-7.5% per ADA    Diabetes knowledge and skills  assessment:   Patient is knowledgeable in diabetes management concepts related to: Healthy Eating, Being Active, Taking Medication, Reducing Risks, and Healthy Coping    Based on learning assessment above, most appropriate setting for further diabetes education would be: Individual setting.    PLAN  1. Eat 3 balanced meals each day - Monitor carb intake and limit to 45-60 grams per meal  This would be equal to 3-4 choices ~  1 choice = 15 grams    Do not wait longer than 4-5 hours to eat something- ESPECIALLY CAUSE YOU ARE TAKING THE GLIPIZIDE  Snacks limit to no more than 30 grams of carbohydrates or 2 choices  **Make sure you include protein source with each meal and at bedtime - this has been shown to help with blood glucose elevations    2.  Check blood sugars once each day - please try and alternate the times you check between am fasting( right after you wake before eating) and 2 hours AFTER dinner - this allows us to get a better idea of what is happening throughout your day , not at just one time     Fasting and before meal target is 80 - 130   2 hours after a meal target is < 180  remember to bring meter and log book to all appointments    3. Incorporate 30 minutes activity into each day - does not need to be all at one time & walking counts    4. Take diabetes medications as prescribed we are increasing Glipizide to 5 mg once daily - IF you notice ANY signs of low blood sugar let me know and cut pill in half     Topics to cover at upcoming visits: Healthy Eating, Being Active, Taking Medication, Problem Solving, Reducing Risks, and Healthy Coping    Follow-up: 9/16/25    See Care Plan for co-developed, patient-state behavior change goals.  AVS provided for patient today.    Education Materials Provided:  No new materials provided today      SUBJECTIVE/OBJECTIVE:  Presents for: Individual review  Accompanied by: Self  Diabetes education in the past 24 mo: Yes (LV 1/20/25)  Focus of Visit: Self-care  "behavioral goal setting, Assistance w/ making life changes, Taking Medication, Monitoring, Healthy Coping, Healthy Eating  Diabetes type: Type 2  Disease course: Stable  Other concerns: None  Cultural Influences/Ethnic Background:  Not  or       Diabetes Symptoms & Complications:  Weight trend: Stable  Disease course: Stable  Complications assessed today?: No    Patient Problem List and Family Medical History reviewed for relevant medical history, current medical status, and diabetes risk factors.    Vitals:  Wt 94 kg (207 lb 3.2 oz)   LMP  (LMP Unknown)   BMI 30.87 kg/m    Estimated body mass index is 30.87 kg/m  as calculated from the following:    Height as of 5/9/25: 1.745 m (5' 8.7\").    Weight as of this encounter: 94 kg (207 lb 3.2 oz).   Last 3 BP:   BP Readings from Last 3 Encounters:   05/09/25 (!) 150/93   01/24/25 (!) 144/86   11/15/24 122/74       History   Smoking Status     Some Days     Types: Cigarettes   Smokeless Tobacco     Never       Labs:  Lab Results   Component Value Date    A1C 7.5 06/02/2025     Lab Results   Component Value Date     01/20/2025     06/03/2022     Lab Results   Component Value Date    LDL  07/17/2024      Comment:      Cannot estimate LDL when triglyceride exceeds 400 mg/dL     07/17/2024     06/03/2022     Direct Measure HDL   Date Value Ref Range Status   07/17/2024 47 (L) >=50 mg/dL Final   ]  GFR Estimate   Date Value Ref Range Status   01/20/2025 72 >60 mL/min/1.73m2 Final     Comment:     eGFR calculated using 2021 CKD-EPI equation.   07/16/2020 >60 >60 mL/min/1.73m2 Final     GFR Estimate If Black   Date Value Ref Range Status   07/16/2020 >60 >60 mL/min/1.73m2 Final     Lab Results   Component Value Date    CR 0.86 01/20/2025     No results found for: \"MICROALBUMIN\"    Healthy Eating:  Healthy Eating Assessed Today: Yes  Cultural/Caodaism diet restrictions?: No  Do you have any food allergies or intolerances?: Yes  Please " list your food allergies / intolerances: blueberry, hazelnut, celery, tomatoes, legumes - recently dx with oral allergy syndrome ( pollens in air react with certain foods)  Meal planning/habits: Smaller portions, Avoiding sweets, Low carb  Who cooks/prepares meals for you?: Self, Other  Who purchases food in  your home?: Self  How many times a week on average do you eat food made away from home (restaurant/take-out)?: 3  Meals include: Breakfast, Dinner, Afternoon Snack  Breakfast: 10-11am: Cheerios with milk or scr eggs with 1 slice toast and blackberries  Lunch: 12-1 pm: frequently will go out to lunch with her sister where they will always split a meal- if home might be soup or a sandwich or a light snack  Dinner: 7 pm: + protein  sometimes veg ex: hamburger with 1/2 bun or egg salad sandwich  Snacks: during day - nuts - 1/4 c mixed cashews and peanuts with 1 mini can SF gingerale with 4 oz juice or nature [place] granola bar ( 20 CHO)  evenings  2 slices cheese or nuts      once in awhile small ice cream bar  Other: no white breads, little to no sweets  Beverages: Water, Coffee, Milk, Soda, Other (see Comments)  Please elaborate::  emeterio lemonade ( 5-8 CHO)  Favorite day - carb beverage- soda is the mini cans  Has patient met with a dietitian in the past?: No    Being Active:  Being Active Assessed Today: Yes  Exercise:: Yes (swims at [facility] 2x/wk 60 min and walks on other days 15-30 minutes)  Days per week of moderate to strenuous exercise (like a brisk walk): 4  On average, minutes per day of exercise at this level: 30  How intense was your typical exercise? : Light (like stretching or slow walking)  Exercise Minutes per Week: 120  Barrier to exercise: None    Monitoring:  Monitoring Assessed Today: Yes  Did patient bring glucose meter to appointment? : No (logbook)  Blood Glucose Meter: Accu-chek  Times checking blood sugar at home (number): 1  Times checking blood sugar at home (per): Day  Blood glucose  trend: Fluctuating    Unfortunately Dorita was not monitoring her blood glucose regularly while in [place] the following are readings from April and some in May I have.    FBG: 160, 166, 177, 168, 158, 171, 174, 163, 168, 160, 171  2-hour post prandial: 181, 181, 148, 136, 138, 153, 113, 126    These readings were reviewed and discussed with Dorita during our visit today.  While in [place] she got out of the habit of having a protein at bedtime and I think this had something to do with the increase in her fasting readings.  I recommended she start this back up.     Taking Medications:  Diabetes Medication(s)       Sulfonylureas       glipiZIDE (GLUCOTROL XL) 2.5 MG 24 hr tablet Take 1 tablet (2.5 mg) by mouth daily.     glipiZIDE (GLUCOTROL XL) 5 MG 24 hr tablet Take 1 tablet (5 mg) by mouth daily.     glimepiride (AMARYL) 1 MG tablet Take 1 mg by mouth every morning (before breakfast).            Taking Medication Assessed Today: Yes  Current Treatments: Diet, Oral Medication (taken by mouth)  Diabetes medication side effects?: Yes (was unable to tolerate the Metformin we tried again at last visit)    Problem Solving:  Problem Solving Assessed Today: Yes  Is the patient at risk for hypoglycemia?: No  Is the patient at risk for DKA?: No  Does patient have severe weather/disaster plan for diabetes management?: Not Needed  Does patient have sick day plan for diabetes management?: Not Needed              Reducing Risks:  Reducing Risks Assessed Today: Yes  Diabetes Risks: Age over 45 years, Sedentary Lifestyle  CAD Risks: Post-menopausal, Sedentary lifestyle, Hypertension, Diabetes Mellitus, Family history  Has dilated eye exam at least once a year?: Yes  Sees dentist every 6 months?: Yes  Feet checked by healthcare provider in the last year?: Yes    Healthy Coping:  Healthy Coping Assessed Today: Yes  Informal Support system:: Family  Patient Activation Measure Survey Score:       No data to display                   Care Plan and Education Provided:  Healthy Eating: Balanced meals, Consistency in amount and timing of carbohydrate intake, Eating out, Label reading, and Portion control  Being Active: Amount recommended (150 minutes moderate or 75 minutes vigorous activity and 2-3 days strength training per week) and Finding a physical activity routine that works for you  Monitoring: Frequency of monitoring and Individual glucose targets  Taking Medication: Action of prescribed medication(s), Side effects of prescribed medication(s), and When to take medication(s)  Problem Solving: High glucose - causes, signs/symptoms, treatment and prevention, Low glucose - causes, signs/symptoms, treatment and prevention, and Rule of 15 and carrying a carbohydrate source at all times in case of low glucose  Reducing Risks: Goal for A1c, how it relates to glucose and how often to check, Prevention, early diagnostic measures and treatment of complications, and A1C - goals, relating to blood glucose levels, how often to check  Healthy Coping: Benefits of making appropriate lifestyle changes, Identifying helpful resources, and Utilize support systems    Thank you,  Sandra Hill RN CDCES  Certified Diabetes Care and   Visit type : DSMT      Time Spent: 30 minutes  Encounter Type: Individual    Any diabetes medication dose changes were made via the CDE Protocol per the patient's referring provider and primary care provider. A copy of this encounter was shared with the provider.   Much or all of the text in this note was generated through the use of the Dragon Dictate voice-to-text software.Errors in spelling or words which seem out of context are unintentional. Sound alike errors, in particular, may have escaped editing.

## 2025-06-02 NOTE — PROGRESS NOTES
Diabetes Self-Management Education & Support    Presents for: Individual review    Type of Service: In Person Visit      Assessment  Dorita is a very pleasant 69-year-old who returns to clinic today to review blood glucose, medications, recheck A1c, and to assess/assist her with her current diabetes self-management skills with an A1c not meeting ADA goal.  She arrived today unaccompanied.  Diabetes medications were reviewed and she confirms she continues to take 2.5 mg of glipizide XL daily.  She reports very few missed or skipped doses-maybe once or twice every 2 weeks.  Prior to her visit today we did recheck her A1c and was pleased to see it did return lower than her previous 7.9% to 7.5% today.  I congratulated her on this.  She had a glucose log book with her today and we went over her readings.  Informed her I would like to see her fasting readings just slightly lower, more in the 130-140 range.  For me today she had just returned from a vacation to [place] 2 days ago.  While she was gone it was often difficult to maintain healthy eating patterns and habits.  The friend she was with was taking a GLP-1 for weight loss and often did not want to eat anything.  Dorita inform me she found this very challenging.  That she is back home she is eager to get back on track.  I commended her on this and encouraged her to keep working on things.  We agreed to meet back in clinic when her next A1c is due and I instructed her to call with any questions or concerns that come up before that time.    Patient's most recent   Lab Results   Component Value Date    A1C 7.5 06/02/2025     is meeting goal of 7-7.5% per ADA    Diabetes knowledge and skills assessment:   Patient is knowledgeable in diabetes management concepts related to: Healthy Eating, Being Active, Taking Medication, Reducing Risks, and Healthy Coping    Based on learning assessment above, most appropriate setting for further diabetes education would be:  Individual setting.    PLAN  1. Eat 3 balanced meals each day - Monitor carb intake and limit to 45-60 grams per meal  This would be equal to 3-4 choices ~  1 choice = 15 grams    Do not wait longer than 4-5 hours to eat something- ESPECIALLY CAUSE YOU ARE TAKING THE GLIPIZIDE  Snacks limit to no more than 30 grams of carbohydrates or 2 choices  **Make sure you include protein source with each meal and at bedtime - this has been shown to help with blood glucose elevations    2.  Check blood sugars once each day - please try and alternate the times you check between am fasting( right after you wake before eating) and 2 hours AFTER dinner - this allows us to get a better idea of what is happening throughout your day , not at just one time     Fasting and before meal target is 80 - 130   2 hours after a meal target is < 180  remember to bring meter and log book to all appointments    3. Incorporate 30 minutes activity into each day - does not need to be all at one time & walking counts    4. Take diabetes medications as prescribed we are increasing Glipizide to 5 mg once daily - IF you notice ANY signs of low blood sugar let me know and cut pill in half     Topics to cover at upcoming visits: Healthy Eating, Being Active, Taking Medication, Problem Solving, Reducing Risks, and Healthy Coping    Follow-up: 9/16/25    See Care Plan for co-developed, patient-state behavior change goals.  AVS provided for patient today.    Education Materials Provided:  No new materials provided today      SUBJECTIVE/OBJECTIVE:  Presents for: Individual review  Accompanied by: Self  Diabetes education in the past 24 mo: Yes (LV 1/20/25)  Focus of Visit: Self-care behavioral goal setting, Assistance w/ making life changes, Taking Medication, Monitoring, Healthy Coping, Healthy Eating  Diabetes type: Type 2  Disease course: Stable  Other concerns: None  Cultural Influences/Ethnic Background:  Not  or       Diabetes Symptoms &  "Complications:  Weight trend: Stable  Disease course: Stable  Complications assessed today?: No    Patient Problem List and Family Medical History reviewed for relevant medical history, current medical status, and diabetes risk factors.    Vitals:  Wt 94 kg (207 lb 3.2 oz)   LMP  (LMP Unknown)   BMI 30.87 kg/m    Estimated body mass index is 30.87 kg/m  as calculated from the following:    Height as of 5/9/25: 1.745 m (5' 8.7\").    Weight as of this encounter: 94 kg (207 lb 3.2 oz).   Last 3 BP:   BP Readings from Last 3 Encounters:   05/09/25 (!) 150/93   01/24/25 (!) 144/86   11/15/24 122/74       History   Smoking Status    Some Days    Types: Cigarettes   Smokeless Tobacco    Never       Labs:  Lab Results   Component Value Date    A1C 7.5 06/02/2025     Lab Results   Component Value Date     01/20/2025     06/03/2022     Lab Results   Component Value Date    LDL  07/17/2024      Comment:      Cannot estimate LDL when triglyceride exceeds 400 mg/dL     07/17/2024     06/03/2022     Direct Measure HDL   Date Value Ref Range Status   07/17/2024 47 (L) >=50 mg/dL Final   ]  GFR Estimate   Date Value Ref Range Status   01/20/2025 72 >60 mL/min/1.73m2 Final     Comment:     eGFR calculated using 2021 CKD-EPI equation.   07/16/2020 >60 >60 mL/min/1.73m2 Final     GFR Estimate If Black   Date Value Ref Range Status   07/16/2020 >60 >60 mL/min/1.73m2 Final     Lab Results   Component Value Date    CR 0.86 01/20/2025     No results found for: \"MICROALBUMIN\"    Healthy Eating:  Healthy Eating Assessed Today: Yes  Cultural/Religion diet restrictions?: No  Do you have any food allergies or intolerances?: Yes  Please list your food allergies / intolerances: blueberry, hazelnut, celery, tomatoes, legumes - recently dx with oral allergy syndrome ( pollens in air react with certain foods)  Meal planning/habits: Smaller portions, Avoiding sweets, Low carb  Who cooks/prepares meals for you?: Self, " Other  Who purchases food in  your home?: Self  How many times a week on average do you eat food made away from home (restaurant/take-out)?: 3  Meals include: Breakfast, Dinner, Afternoon Snack  Breakfast: 10-11am: Cheerios with milk or scr eggs with 1 slice toast and blackberries  Lunch: 12-1 pm: frequently will go out to lunch with her sister where they will always split a meal- if home might be soup or a sandwich or a light snack  Dinner: 7 pm: + protein  sometimes veg ex: hamburger with 1/2 bun or egg salad sandwich  Snacks: during day - nuts - 1/4 c mixed cashews and peanuts with 1 mini can SF gingerale with 4 oz juice or nature [place] granola bar ( 20 CHO)  evenings  2 slices cheese or nuts      once in awhile small ice cream bar  Other: no white breads, little to no sweets  Beverages: Water, Coffee, Milk, Soda, Other (see Comments)  Please elaborate::  emeterio lemonade ( 5-8 CHO)  Favorite day - carb beverage- soda is the mini cans  Has patient met with a dietitian in the past?: No    Being Active:  Being Active Assessed Today: Yes  Exercise:: Yes (swims at [facility] 2x/wk 60 min and walks on other days 15-30 minutes)  Days per week of moderate to strenuous exercise (like a brisk walk): 4  On average, minutes per day of exercise at this level: 30  How intense was your typical exercise? : Light (like stretching or slow walking)  Exercise Minutes per Week: 120  Barrier to exercise: None    Monitoring:  Monitoring Assessed Today: Yes  Did patient bring glucose meter to appointment? : No (logbook)  Blood Glucose Meter: Accu-chek  Times checking blood sugar at home (number): 1  Times checking blood sugar at home (per): Day  Blood glucose trend: Fluctuating    Unfortunately Dorita was not monitoring her blood glucose regularly while in [place] the following are readings from April and some in May I have.    FBG: 160, 166, 177, 168, 158, 171, 174, 163, 168, 160, 171  2-hour post prandial: 181, 181, 148, 136, 138,  153, 113, 126    These readings were reviewed and discussed with Dorita during our visit today.  While in [place] she got out of the habit of having a protein at bedtime and I think this had something to do with the increase in her fasting readings.  I recommended she start this back up.     Taking Medications:  Diabetes Medication(s)       Sulfonylureas       glipiZIDE (GLUCOTROL XL) 2.5 MG 24 hr tablet Take 1 tablet (2.5 mg) by mouth daily.     glipiZIDE (GLUCOTROL XL) 5 MG 24 hr tablet Take 1 tablet (5 mg) by mouth daily.     glimepiride (AMARYL) 1 MG tablet Take 1 mg by mouth every morning (before breakfast).            Taking Medication Assessed Today: Yes  Current Treatments: Diet, Oral Medication (taken by mouth)  Diabetes medication side effects?: Yes (was unable to tolerate the Metformin we tried again at last visit)    Problem Solving:  Problem Solving Assessed Today: Yes  Is the patient at risk for hypoglycemia?: No  Is the patient at risk for DKA?: No  Does patient have severe weather/disaster plan for diabetes management?: Not Needed  Does patient have sick day plan for diabetes management?: Not Needed              Reducing Risks:  Reducing Risks Assessed Today: Yes  Diabetes Risks: Age over 45 years, Sedentary Lifestyle  CAD Risks: Post-menopausal, Sedentary lifestyle, Hypertension, Diabetes Mellitus, Family history  Has dilated eye exam at least once a year?: Yes  Sees dentist every 6 months?: Yes  Feet checked by healthcare provider in the last year?: Yes    Healthy Coping:  Healthy Coping Assessed Today: Yes  Informal Support system:: Family  Patient Activation Measure Survey Score:       No data to display                  Care Plan and Education Provided:  Healthy Eating: Balanced meals, Consistency in amount and timing of carbohydrate intake, Eating out, Label reading, and Portion control  Being Active: Amount recommended (150 minutes moderate or 75 minutes vigorous activity and 2-3 days  strength training per week) and Finding a physical activity routine that works for you  Monitoring: Frequency of monitoring and Individual glucose targets  Taking Medication: Action of prescribed medication(s), Side effects of prescribed medication(s), and When to take medication(s)  Problem Solving: High glucose - causes, signs/symptoms, treatment and prevention, Low glucose - causes, signs/symptoms, treatment and prevention, and Rule of 15 and carrying a carbohydrate source at all times in case of low glucose  Reducing Risks: Goal for A1c, how it relates to glucose and how often to check, Prevention, early diagnostic measures and treatment of complications, and A1C - goals, relating to blood glucose levels, how often to check  Healthy Coping: Benefits of making appropriate lifestyle changes, Identifying helpful resources, and Utilize support systems    Thank you,  Sandra Hill RN CDCES  Certified Diabetes Care and   Visit type : DSMT      Time Spent: 30 minutes  Encounter Type: Individual    Any diabetes medication dose changes were made via the CDE Protocol per the patient's referring provider and primary care provider. A copy of this encounter was shared with the provider.   Much or all of the text in this note was generated through the use of the Dragon Dictate voice-to-text software.Errors in spelling or words which seem out of context are unintentional. Sound alike errors, in particular, may have escaped editing.

## 2025-06-04 ENCOUNTER — RESULTS FOLLOW-UP (OUTPATIENT)
Dept: INTERNAL MEDICINE | Facility: CLINIC | Age: 70
End: 2025-06-04

## 2025-06-05 DIAGNOSIS — E78.2 MIXED HYPERLIPIDEMIA: ICD-10-CM

## 2025-06-05 RX ORDER — ROSUVASTATIN CALCIUM 40 MG/1
40 TABLET, COATED ORAL DAILY
Qty: 90 TABLET | Refills: 3 | Status: SHIPPED | OUTPATIENT
Start: 2025-06-05

## 2025-07-06 DIAGNOSIS — I10 ESSENTIAL HYPERTENSION: ICD-10-CM

## 2025-07-06 DIAGNOSIS — F41.9 ANXIETY: ICD-10-CM

## 2025-07-07 ENCOUNTER — MYC REFILL (OUTPATIENT)
Dept: FAMILY MEDICINE | Facility: CLINIC | Age: 70
End: 2025-07-07
Payer: MEDICARE

## 2025-07-07 ENCOUNTER — MYC REFILL (OUTPATIENT)
Dept: INTERNAL MEDICINE | Facility: CLINIC | Age: 70
End: 2025-07-07
Payer: MEDICARE

## 2025-07-07 DIAGNOSIS — E78.2 MIXED HYPERLIPIDEMIA: ICD-10-CM

## 2025-07-07 DIAGNOSIS — F41.9 ANXIETY: ICD-10-CM

## 2025-07-07 RX ORDER — ASPIRIN 81 MG/1
81 TABLET, CHEWABLE ORAL DAILY
Qty: 90 TABLET | Refills: 3 | OUTPATIENT
Start: 2025-07-07

## 2025-07-07 RX ORDER — LOSARTAN POTASSIUM 100 MG/1
100 TABLET ORAL DAILY
Qty: 90 TABLET | Refills: 2 | Status: SHIPPED | OUTPATIENT
Start: 2025-07-07

## 2025-07-07 RX ORDER — ESCITALOPRAM OXALATE 10 MG/1
10 TABLET ORAL DAILY
Qty: 90 TABLET | Refills: 1 | Status: SHIPPED | OUTPATIENT
Start: 2025-07-07

## 2025-07-07 RX ORDER — ESCITALOPRAM OXALATE 10 MG/1
10 TABLET ORAL DAILY
Qty: 90 TABLET | Refills: 1 | OUTPATIENT
Start: 2025-07-07

## 2025-07-12 ENCOUNTER — HOSPITAL ENCOUNTER (OUTPATIENT)
Dept: MRI IMAGING | Facility: CLINIC | Age: 70
Discharge: HOME | End: 2025-07-12
Attending: PHYSICIAN ASSISTANT | Admitting: PHYSICIAN ASSISTANT
Payer: MEDICARE

## 2025-07-12 DIAGNOSIS — M25.562 ACUTE PAIN OF LEFT KNEE: ICD-10-CM

## 2025-07-12 PROCEDURE — 73721 MRI JNT OF LWR EXTRE W/O DYE: CPT | Mod: LT

## 2025-07-13 DIAGNOSIS — M17.10 ARTHRITIS OF KNEE: ICD-10-CM

## 2025-07-14 RX ORDER — MELOXICAM 15 MG/1
15 TABLET ORAL DAILY
Qty: 30 TABLET | Refills: 1 | Status: SHIPPED | OUTPATIENT
Start: 2025-07-14 | End: 2025-07-15

## 2025-07-15 ENCOUNTER — RESULTS FOLLOW-UP (OUTPATIENT)
Dept: ORTHOPEDICS | Facility: CLINIC | Age: 70
End: 2025-07-15
Payer: MEDICARE

## 2025-07-15 DIAGNOSIS — Z87.820 HX OF TRAUMATIC BRAIN INJURY: ICD-10-CM

## 2025-07-15 DIAGNOSIS — R93.7 BONE MARROW EDEMA: ICD-10-CM

## 2025-07-15 DIAGNOSIS — S83.242S ACUTE MEDIAL MENISCUS TEAR OF LEFT KNEE, SEQUELA: ICD-10-CM

## 2025-07-15 DIAGNOSIS — E11.69 TYPE 2 DIABETES MELLITUS WITH OTHER SPECIFIED COMPLICATION, WITHOUT LONG-TERM CURRENT USE OF INSULIN (H): ICD-10-CM

## 2025-07-15 DIAGNOSIS — M17.12 PRIMARY OSTEOARTHRITIS OF LEFT KNEE: Primary | ICD-10-CM

## 2025-07-15 RX ORDER — MELOXICAM 15 MG/1
15 TABLET ORAL DAILY
Qty: 30 TABLET | Refills: 1 | Status: SHIPPED | OUTPATIENT
Start: 2025-07-15

## 2025-07-16 ENCOUNTER — PATIENT OUTREACH (OUTPATIENT)
Dept: CARE COORDINATION | Facility: CLINIC | Age: 70
End: 2025-07-16
Payer: MEDICARE

## 2025-08-10 DIAGNOSIS — M1A.00X0 IDIOPATHIC CHRONIC GOUT WITHOUT TOPHUS, UNSPECIFIED SITE: ICD-10-CM

## 2025-08-11 RX ORDER — ALLOPURINOL 100 MG/1
100 TABLET ORAL DAILY
Qty: 90 TABLET | Refills: 3 | Status: SHIPPED | OUTPATIENT
Start: 2025-08-11